# Patient Record
Sex: MALE | Race: WHITE
[De-identification: names, ages, dates, MRNs, and addresses within clinical notes are randomized per-mention and may not be internally consistent; named-entity substitution may affect disease eponyms.]

---

## 2017-01-27 ENCOUNTER — HOSPITAL ENCOUNTER (INPATIENT)
Dept: HOSPITAL 84 - D.M2 | Age: 72
LOS: 1 days | Discharge: HOME | DRG: 312 | End: 2017-01-28
Attending: FAMILY MEDICINE | Admitting: FAMILY MEDICINE
Payer: MEDICARE

## 2017-01-27 VITALS — DIASTOLIC BLOOD PRESSURE: 61 MMHG | SYSTOLIC BLOOD PRESSURE: 100 MMHG

## 2017-01-27 VITALS — SYSTOLIC BLOOD PRESSURE: 111 MMHG | DIASTOLIC BLOOD PRESSURE: 72 MMHG

## 2017-01-27 VITALS — DIASTOLIC BLOOD PRESSURE: 51 MMHG | SYSTOLIC BLOOD PRESSURE: 100 MMHG

## 2017-01-27 VITALS — BODY MASS INDEX: 24.64 KG/M2 | HEIGHT: 66 IN | WEIGHT: 153.32 LBS

## 2017-01-27 VITALS — SYSTOLIC BLOOD PRESSURE: 105 MMHG | DIASTOLIC BLOOD PRESSURE: 68 MMHG

## 2017-01-27 DIAGNOSIS — I42.9: ICD-10-CM

## 2017-01-27 DIAGNOSIS — N18.3: ICD-10-CM

## 2017-01-27 DIAGNOSIS — R07.9: ICD-10-CM

## 2017-01-27 DIAGNOSIS — I49.3: ICD-10-CM

## 2017-01-27 DIAGNOSIS — Z72.0: ICD-10-CM

## 2017-01-27 DIAGNOSIS — R94.31: ICD-10-CM

## 2017-01-27 DIAGNOSIS — R55: Primary | ICD-10-CM

## 2017-01-27 LAB
ALBUMIN SERPL-MCNC: 3.4 G/DL (ref 3.4–5)
ALP SERPL-CCNC: 80 U/L (ref 46–116)
ALT SERPL-CCNC: 14 U/L (ref 10–68)
ANION GAP SERPL CALC-SCNC: 9.6 MMOL/L (ref 8–16)
BASOPHILS NFR BLD AUTO: 0.2 % (ref 0–2)
BILIRUB SERPL-MCNC: 0.52 MG/DL (ref 0.2–1.3)
BUN SERPL-MCNC: 33 MG/DL (ref 7–18)
CALCIUM SERPL-MCNC: 9.6 MG/DL (ref 8.5–10.1)
CHLORIDE SERPL-SCNC: 103 MMOL/L (ref 98–107)
CK MB SERPL-MCNC: 0.5 U/L (ref 0–3.6)
CK SERPL-CCNC: 40 UL (ref 21–232)
CO2 SERPL-SCNC: 27.3 MMOL/L (ref 21–32)
CREAT SERPL-MCNC: 1.4 MG/DL (ref 0.6–1.3)
EOSINOPHIL NFR BLD: 0.7 % (ref 0–7)
ERYTHROCYTE [DISTWIDTH] IN BLOOD BY AUTOMATED COUNT: 16.2 % (ref 11.5–14.5)
GLOBULIN SER-MCNC: 3.9 G/L
GLUCOSE SERPL-MCNC: 108 MG/DL (ref 74–106)
HCT VFR BLD CALC: 39.1 % (ref 42–54)
HGB BLD-MCNC: 12.7 G/DL (ref 13.5–17.5)
IMM GRANULOCYTES NFR BLD: 0.2 % (ref 0–5)
LYMPHOCYTES NFR BLD AUTO: 19.1 % (ref 15–50)
MCH RBC QN AUTO: 28.2 PG (ref 26–34)
MCHC RBC AUTO-ENTMCNC: 32.5 G/DL (ref 31–37)
MCV RBC: 86.9 FL (ref 80–100)
MONOCYTES NFR BLD: 7.3 % (ref 2–11)
NEUTROPHILS NFR BLD AUTO: 72.5 % (ref 40–80)
OSMOLALITY SERPL CALC.SUM OF ELEC: 279 MOSM/KG (ref 275–300)
PLATELET # BLD: 199 10X3/UL (ref 130–400)
PMV BLD AUTO: 10.6 FL (ref 7.4–10.4)
POTASSIUM SERPL-SCNC: 3.9 MMOL/L (ref 3.5–5.1)
PROT SERPL-MCNC: 7.3 G/DL (ref 6.4–8.2)
RBC # BLD AUTO: 4.5 10X6/UL (ref 4.2–6.1)
SODIUM SERPL-SCNC: 136 MMOL/L (ref 136–145)
TROPONIN I SERPL-MCNC: 0.04 NG/ML (ref 0–0.06)
WBC # BLD AUTO: 10.4 10X3/UL (ref 4.8–10.8)

## 2017-01-27 NOTE — NUR
TRANSFERED FROM ADMISSIONS BY W/C.  OREINTED TO ROOM.  CALL LIGHT IN REACH.
WILL CONT. PLAN OF CARE.

## 2017-01-27 NOTE — NUR
PT RESTING SOUNDLY IN BED WITHOUT C/O OR DISTRESS NOTED. NSR ON MONITOR, HR
78. DENIES ANY C/O PAIN. LEFT FOREARM WITH NS @ 50 CC/HR, INFUSING WITHOUT
COMPLICATIONS. WILL CONT TO MONITOR.

## 2017-01-28 VITALS — SYSTOLIC BLOOD PRESSURE: 111 MMHG | DIASTOLIC BLOOD PRESSURE: 60 MMHG

## 2017-01-28 VITALS — DIASTOLIC BLOOD PRESSURE: 69 MMHG | SYSTOLIC BLOOD PRESSURE: 120 MMHG

## 2017-01-28 VITALS — DIASTOLIC BLOOD PRESSURE: 79 MMHG | SYSTOLIC BLOOD PRESSURE: 137 MMHG

## 2017-01-28 VITALS — SYSTOLIC BLOOD PRESSURE: 126 MMHG | DIASTOLIC BLOOD PRESSURE: 70 MMHG

## 2017-01-28 VITALS — SYSTOLIC BLOOD PRESSURE: 116 MMHG | DIASTOLIC BLOOD PRESSURE: 68 MMHG

## 2017-01-28 VITALS — DIASTOLIC BLOOD PRESSURE: 54 MMHG | SYSTOLIC BLOOD PRESSURE: 88 MMHG

## 2017-01-28 VITALS — SYSTOLIC BLOOD PRESSURE: 85 MMHG | DIASTOLIC BLOOD PRESSURE: 50 MMHG

## 2017-01-28 LAB
ALBUMIN SERPL-MCNC: 3.5 G/DL (ref 3.4–5)
ALP SERPL-CCNC: 79 U/L (ref 46–116)
ALT SERPL-CCNC: 19 U/L (ref 10–68)
ANION GAP SERPL CALC-SCNC: 10.2 MMOL/L (ref 8–16)
BASOPHILS NFR BLD AUTO: 0.2 % (ref 0–2)
BILIRUB SERPL-MCNC: 0.4 MG/DL (ref 0.2–1.3)
BUN SERPL-MCNC: 22 MG/DL (ref 7–18)
CALCIUM SERPL-MCNC: 9 MG/DL (ref 8.5–10.1)
CHLORIDE SERPL-SCNC: 104 MMOL/L (ref 98–107)
CK MB SERPL-MCNC: 0.6 U/L (ref 0–3.6)
CK SERPL-CCNC: 44 UL (ref 21–232)
CO2 SERPL-SCNC: 27 MMOL/L (ref 21–32)
CREAT SERPL-MCNC: 1.2 MG/DL (ref 0.6–1.3)
EOSINOPHIL NFR BLD: 2 % (ref 0–7)
ERYTHROCYTE [DISTWIDTH] IN BLOOD BY AUTOMATED COUNT: 16.3 % (ref 11.5–14.5)
GLOBULIN SER-MCNC: 3.5 G/L
GLUCOSE SERPL-MCNC: 109 MG/DL (ref 74–106)
HCT VFR BLD CALC: 42.1 % (ref 42–54)
HGB BLD-MCNC: 13.4 G/DL (ref 13.5–17.5)
IMM GRANULOCYTES NFR BLD: 0 % (ref 0–5)
LYMPHOCYTES NFR BLD AUTO: 22.4 % (ref 15–50)
MCH RBC QN AUTO: 28 PG (ref 26–34)
MCHC RBC AUTO-ENTMCNC: 31.8 G/DL (ref 31–37)
MCV RBC: 88.1 FL (ref 80–100)
MONOCYTES NFR BLD: 7.8 % (ref 2–11)
NEUTROPHILS NFR BLD AUTO: 67.6 % (ref 40–80)
OSMOLALITY SERPL CALC.SUM OF ELEC: 277 MOSM/KG (ref 275–300)
PLATELET # BLD: 203 10X3/UL (ref 130–400)
PMV BLD AUTO: 10.8 FL (ref 7.4–10.4)
POTASSIUM SERPL-SCNC: 4.2 MMOL/L (ref 3.5–5.1)
PROT SERPL-MCNC: 7 G/DL (ref 6.4–8.2)
RBC # BLD AUTO: 4.78 10X6/UL (ref 4.2–6.1)
SODIUM SERPL-SCNC: 137 MMOL/L (ref 136–145)
TROPONIN I SERPL-MCNC: 0.04 NG/ML (ref 0–0.06)
WBC # BLD AUTO: 8.1 10X3/UL (ref 4.8–10.8)

## 2017-01-28 NOTE — NUR
Is the patient Alert and Oriented? Yes  0
* How many steps to enter\exit or inside your home? Three  0
* PCP DR Huertas  0
* Pharmacy Mail order delivery- [facility]
Short term medications- [facility]  0
* Preadmission Environment Home Alone  0
* ADLs Independent  0
* Equipment Cane  0
* Other Equipment N/A  0
* List name and contact numbers for known caregivers / representatives who
currently or will assist patient after discharge: Luna Carr - [facility]-
[phone]
Luna- [facility]- [phone]  0
* Community resources currently utilized None  0
* Additional services required to return to the preadmission environment? Yes
0
* Can the patient safely return to the preadmission environment? Yes  0
* Has this patient been hospitalized within the prior 30 days at any hospital?
No
CM met w/ patient and his 2 daughters at the bedside. They requested home
health. Patient lives alone. Had syncopal episodes. He has 3 steps to enter
his home. One daughter lines in [facility]. His daughter, Luna,
lives in [place].
He had home health 2015 and could not recall the agency name. CM called health
information & it was [facility] which is now [facility].
PCP in DR Huertas
Cardiologist is DR Heredia
Pharmacy- [facility]
Patient has transportation.
MD order and Patient choice form obtained.
TC to [facility]. CM spoke w/ Amy.
Faxed referral. Patient will be seen Sunday or Monday. They will notify the
patient of visit day.

## 2017-01-30 NOTE — EC
PATIENT:ABRRY TRUONG           DATE OF SERVICE: 01/27/17
SEX: M                                  MEDICAL RECORD: [id number]
DATE OF BIRTH: [birth year]                        LOCATION:D.      D.211
AGE OF PATIENT: 71                             ADMISSION DATE: 01/27/17
 
REFERRING PHYSICIAN:                               
 
INTERPRETING PHYSICIAN: RE HEREDIA MD             
 
 
 
                             ECHOCARDIOGRAM REPORT
  ECHO CHARGES 4               ECHO COMPLETE            
 
 
 
CLINICAL DIAGNOSIS: CP/SYCOPE/SOB                 
 
                         ECHOCARDIOGRAPHIC MEASUREMENTS
      (adult normal given)
        AC root (d.<3.7cm) 3.5    LV Septum d (<1.2 cm> 1.4 
           Valve Excursion 1.4      LV Septum (systole) 1.9 
     Left Atria (s.<4.0cm> 3.7           LVPW d(<1.2cm) 1.5 
             RV (d.<2.3cm) 2.6            LVPW (sytole) 1.6 
       LV diastole(<5.6CM) 6.4        MV E-F(>70mm/sec)     
                LV systole 4.7            LVOT Diameter 2.2 
            MV exc.(>10mm)     
Est.ejection fraction (50-75%)     Pericardial Effusion N
 
   DOPPLER:
     LVIT       A 96.0 E 52.0
       LA      RVSP 57.0
     LVOT 68.0 AOP1/2T 401.0
  Asc. Ao 214 
     RVOT 88.0
       RA     
        
 AV Gradient Peak 18.3  AV Mean 8.2   AV Area 1.4 
 MV Gradient Peak 4.1   MV Mean 1.1   MV Area     
   COMMENTS:                                              
 
 
 Cardiac Sonographer: Russ ORTIZ            
      Cardiologist:1          Dr. Heredia                
             TAPE# PACS           
                                                                                     
 
 
DATE OF SERVICE:  01/27/2017
 
Echocardiogram
 
FINDINGS:
1.  Left ventricular chamber size is mildly dilated.  Left ventricular systolic
function is markedly reduced, overall ejection fraction 25% to 30%.  This
however, is an improvement on his previous echocardiogram done last year showing
ejection fraction 20%.
2.  Left atrium is within normal limits at 3.7 cm.  Right atrium and right
 
 
 
ECHOCARDIOGRAM REPORT                          [id number]    BARRY TRUONG   
 
 
ventricular chamber sizes are upper limits of normal.
3.  Valvular structures have normal structure and motion.
4.  Doppler interrogation reveals mild aortic insufficiency, moderate mitral
regurgitation, and mild tricuspid regurgitation.  No other valvular
insufficiency or stenosis; however, pulmonary systolic pressure is mildly
elevated estimated at 57 mmHg.
5.  No evidence of pericardial effusion or left ventricular thrombus.
 
TRANSINT:[id number] Voice Confirmation ID: [id number] DOCUMENT ID: [id number]
                                           
                                           RE HEREDIA MD             
 
 
 
Electronically Signed by RE HEREDIA on 01/30/17 at 1416
 
 
 
 
 
 
 
 
 
 
 
 
 
 
 
 
 
 
 
 
 
 
 
 
 
 
 
 
 
 
 
CC: BRANDON NEGRETE DO                                         [id number]
DICTATION DATE: 01/27/17 1844     :     01/28/17 0821      DIS IN  
                                                                      01/28/17
[facility]                                          
[address] [place] [address]

## 2017-01-30 NOTE — CN
PATIENT NAME:BARRY CARR                         MEDICAL RECORD: [id number]
: 41                                              LOCATION:D. D.2116
ADMIT DATE: 17                                       ACCOUNT: [id number]
CONSULTING PHYSICIAN:    RE CONTRERAS MD             
                                               
REFERRING PHYSICIAN:     BRANDON NEGRETE DO           
 
 
DATE OF CONSULTATION:  2017
 
Cardiology Consultation
 
DIAGNOSES:
1.  Syncope.
2.  Cardiomyopathy, nonischemic.
3.  Abnormal ECG.
 
HISTORY OF PRESENT ILLNESS:  Mr. Carr got out of bed at night to go the
bathroom and had an episode of syncope.  He did not have any chest pain or chest
discomfort.  He has a history of a nonischemic cardiomyopathy, cardiac
catheterization last year revealed ejection fraction in the 25% to 30% range. 
This has continued on an echocardiogram we have from ____.  He is overall
asymptomatic from a heart failure standpoint.  He has not had any significant
arrhythmias on telemetry, only occasional PVCs.
 
PHYSICAL EXAMINATION:
GENERAL APPEARANCE:  Well-nourished, well-developed, appears stated age.  Level
of distress, comfortable. 
PSYCHIATRIC:  Mental status, alert, normal affect.  Orientation, oriented to
time, place and person.  
EYES:  Lids and conjunctiva, noninjected.  No discharge, no pallor. 
ENT:  Lips, teeth, gums, normal dentition.  Oropharynx, no cyanosis, no pallor. 
NECK:  Carotid arteries, bilateral normal upstroke, no bruits, no thrills. 
JUGULAR VEINS:  No jugular venous pressure or distention. 
CERVICAL LYMPH NODES:  Nontender, nonenlarged.  
THYROID:  Not enlarged.  Nontender.  No nodules. 
LUNGS:  Respiratory effort, unlabored. 
CHEST:  Normal curvature.  No thoracic deformity.  No chest wall tenderness. 
Percussion, resonant.  Auscultation, clear.  No wheezes, no rales, no rhonchi. 
CARDIOVASCULAR:  Precordial exam, nondisplaced.  No heaves or pericardial
thrills.  Rate and rhythm, regular.  Heart sounds, normal S1, normal S2.  No S3,
no gallop, no rub.  Systolic murmur, not heard.  Diastolic murmur, not heard. 
EXTREMITIES:  No cyanosis, no edema.  Peripheral pulses, full and equal in all
extremities, except as noted.  No bruits appreciated. 
ABDOMEN:  Soft, nondistended.  Normal aorta.  No bruit.  Nontender.  No masses. 
Liver, nontender, no hepatomegaly.  Spleen, nontender, no splenomegaly.  
MUSCULOSKELETAL:  No joint tenderness.  No joint swelling.  No erythema.  
NEUROLOGICAL:  Normal gait, normal strength, normal tone.
SKIN:  Warm and dry.
 
REVIEW OF SYSTEMS:  The patient reports easy bruising but reports no swollen
glands. The patient reports no fever, no night sweats, no significant weight
gain, no significant weight loss.  No significant exercise tolerance.  The
patient reports no dry eyes, no irritation, no vision change.  Patient reports
no difficulty hearing and no ear pain.  Patient reports no frequent nose bleeds
or nose and sinus problems.  Patient reports on arm pain on exertion.   No
shortness of breath while lying down.  No history of heart murmur.  Patient
reports no cough, no wheezing or coughing up blood.  Patient reports no
 
 
 
CONSULT REPORT                                 [id number]    BARRY CARR       
 
 
abdominal pain, no vomiting.  Normal appetite.  No diarrhea and not vomiting
blood.  No nausea and no constipation.  Patient reports no incontinence.  No
difficulty urinating.  No hematuria.  No increased frequency.  Patient reports
no muscle aches.  No weakness, no arthralgias, no back pain.  No swelling of the
extremities.  Patient reports no abnormal mole, no jaundice, no rashes.  Reports
no loss of consciousness.  No weakness and no numbness.  No seizures, dizziness,
or headaches.  The patient reports no depression, no sleep disturbance, feeling
safe in a relationship and no alcohol abuse.  Patient reports on fatigue. 
Reports no runny nose or sinus pressure.  No itching, no hives, and no frequent
sneezing.  
 
OVERALL IMPRESSION:  Nonischemic cardiomyopathy.  His heart rate is 90, however,
his systolic blood pressure is 105, it would be hard to increase his carvedilol
to get better heart rate control with this.  We have no reason to think that
this was an arrhythmia.  It barely maybe an orthostatic event.  He has not been
orthostatic since he has been here on blood pressure.  At this time, no other
cardiac workup treatment is necessary.
 
TRANSINT:[id number] Voice Confirmation ID: [id number] DOCUMENT ID: [id number]
                                           
                                           RE CONTRERAS MD             
 
 
 
Electronically Signed by RE CONTRERAS on 17 at 1416
 
 
 
 
 
 
 
 
 
 
 
 
 
 
 
 
 
 
 
 
 
 
CC:                                                             [id number]
DICTATION DATE: 17 448     :     17 2346      DIS IN  
                                                                      17
[facility]                                          
 [place] [address]

## 2017-09-21 ENCOUNTER — HOSPITAL ENCOUNTER (INPATIENT)
Dept: HOSPITAL 84 - OBSVTIME | Age: 72
LOS: 3 days | Discharge: HOME | DRG: 352 | End: 2017-09-24
Attending: SURGERY | Admitting: SURGERY
Payer: MEDICARE

## 2017-09-21 VITALS
BODY MASS INDEX: 25.23 KG/M2 | BODY MASS INDEX: 25.23 KG/M2 | HEIGHT: 66 IN | WEIGHT: 157 LBS | BODY MASS INDEX: 25.23 KG/M2

## 2017-09-21 VITALS — SYSTOLIC BLOOD PRESSURE: 121 MMHG | DIASTOLIC BLOOD PRESSURE: 66 MMHG

## 2017-09-21 VITALS — DIASTOLIC BLOOD PRESSURE: 58 MMHG | SYSTOLIC BLOOD PRESSURE: 143 MMHG

## 2017-09-21 VITALS — DIASTOLIC BLOOD PRESSURE: 68 MMHG | SYSTOLIC BLOOD PRESSURE: 152 MMHG

## 2017-09-21 VITALS — DIASTOLIC BLOOD PRESSURE: 63 MMHG | SYSTOLIC BLOOD PRESSURE: 137 MMHG

## 2017-09-21 VITALS — DIASTOLIC BLOOD PRESSURE: 92 MMHG | SYSTOLIC BLOOD PRESSURE: 146 MMHG

## 2017-09-21 VITALS — SYSTOLIC BLOOD PRESSURE: 110 MMHG | DIASTOLIC BLOOD PRESSURE: 62 MMHG

## 2017-09-21 VITALS — DIASTOLIC BLOOD PRESSURE: 60 MMHG | SYSTOLIC BLOOD PRESSURE: 140 MMHG

## 2017-09-21 VITALS — DIASTOLIC BLOOD PRESSURE: 64 MMHG | SYSTOLIC BLOOD PRESSURE: 139 MMHG

## 2017-09-21 VITALS — SYSTOLIC BLOOD PRESSURE: 150 MMHG | DIASTOLIC BLOOD PRESSURE: 76 MMHG

## 2017-09-21 VITALS — DIASTOLIC BLOOD PRESSURE: 76 MMHG | SYSTOLIC BLOOD PRESSURE: 150 MMHG

## 2017-09-21 VITALS — SYSTOLIC BLOOD PRESSURE: 135 MMHG | DIASTOLIC BLOOD PRESSURE: 68 MMHG

## 2017-09-21 DIAGNOSIS — K40.91: Primary | ICD-10-CM

## 2017-09-21 DIAGNOSIS — R22.2: ICD-10-CM

## 2017-09-21 LAB
ALBUMIN SERPL-MCNC: 3.2 G/DL (ref 3.4–5)
ALP SERPL-CCNC: 131 U/L (ref 46–116)
ALT SERPL-CCNC: 72 U/L (ref 10–68)
ANION GAP SERPL CALC-SCNC: 10.5 MMOL/L (ref 8–16)
APTT BLD: 32 SECONDS (ref 22.8–39.4)
BASOPHILS NFR BLD AUTO: 0.3 % (ref 0–2)
BILIRUB SERPL-MCNC: 0.71 MG/DL (ref 0.2–1.3)
BUN SERPL-MCNC: 14 MG/DL (ref 7–18)
CALCIUM SERPL-MCNC: 8.9 MG/DL (ref 8.5–10.1)
CHLORIDE SERPL-SCNC: 103 MMOL/L (ref 98–107)
CO2 SERPL-SCNC: 27.8 MMOL/L (ref 21–32)
CREAT SERPL-MCNC: 1.8 MG/DL (ref 0.6–1.3)
EOSINOPHIL NFR BLD: 3 % (ref 0–7)
ERYTHROCYTE [DISTWIDTH] IN BLOOD BY AUTOMATED COUNT: 15.3 % (ref 11.5–14.5)
GLOBULIN SER-MCNC: 4.4 G/L
GLUCOSE SERPL-MCNC: 88 MG/DL (ref 74–106)
HCT VFR BLD CALC: 39.5 % (ref 42–54)
HGB BLD-MCNC: 13.2 G/DL (ref 13.5–17.5)
IMM GRANULOCYTES NFR BLD: 0.2 % (ref 0–5)
INR PPP: 1.09 (ref 0.85–1.17)
LYMPHOCYTES NFR BLD AUTO: 18.5 % (ref 15–50)
MCH RBC QN AUTO: 29.7 PG (ref 26–34)
MCHC RBC AUTO-ENTMCNC: 33.4 G/DL (ref 31–37)
MCV RBC: 89 FL (ref 80–100)
MONOCYTES NFR BLD: 11.1 % (ref 2–11)
NEUTROPHILS NFR BLD AUTO: 66.9 % (ref 40–80)
OSMOLALITY SERPL CALC.SUM OF ELEC: 275 MOSM/KG (ref 275–300)
PLATELET # BLD: 246 10X3/UL (ref 130–400)
PMV BLD AUTO: 9.6 FL (ref 7.4–10.4)
POTASSIUM SERPL-SCNC: 3.3 MMOL/L (ref 3.5–5.1)
PROT SERPL-MCNC: 7.6 G/DL (ref 6.4–8.2)
PROTHROMBIN TIME: 14 SECONDS (ref 11.6–15)
RBC # BLD AUTO: 4.44 10X6/UL (ref 4.2–6.1)
SODIUM SERPL-SCNC: 138 MMOL/L (ref 136–145)
WBC # BLD AUTO: 6.4 10X3/UL (ref 4.8–10.8)

## 2017-09-21 PROCEDURE — 0JBB0ZZ EXCISION OF PERINEUM SUBCUTANEOUS TISSUE AND FASCIA, OPEN APPROACH: ICD-10-PCS | Performed by: SURGERY

## 2017-09-21 PROCEDURE — 0YU60JZ SUPPLEMENT LEFT INGUINAL REGION WITH SYNTHETIC SUBSTITUTE, OPEN APPROACH: ICD-10-PCS | Performed by: SURGERY

## 2017-09-21 NOTE — NUR
GOT PT OVER TO ROOM ON MEDSURG AND PT VOICED"IM HURTING NOW, I CANT
KEEP COUGHING". FLOOR NURSE IS GIVING REPORT SO I VOICED I WOULD
BRING THE PT SOME PAIN MEDICINE AS ORDERED AND STAY WITH THE PT FOR
10 MINUTES TO MONITOR PAIN MEDICATIONS EFFECTS. PT FAMILY IS IN THE
ROOM. BED IS LOW,SIDE RAILSX2,CALL LIGHT WITHIN REACH.[facility]

## 2017-09-21 NOTE — NUR
RESTING QUIETLY RESPIRATIONS WITH EASE AND UNLABORED. EMPTIED 90CC'S BLOODY
DRAINAGE FROM BRIE DRAIN

## 2017-09-21 NOTE — OP
PATIENT NAME:  BARRY TRUONG                     MEDICAL RECORD: [id number]
:41                                             LOCATION:D.MS [id number]
                                                         ADMISSION DATE:17
SURGEON:  LENIN ROTHMAN MD            
 
 
DATE OF OPERATION:  2017
 
PREOPERATIVE DIAGNOSIS:  Incarcerated left inguinal hernia.
 
POSTOPERATIVE DIAGNOSES:
1.  Recurrent left indirect inguinal hernia.
2.  Left groin mass, infected, of uncertain etiology.
 
PROCEDURES:
1.  Open recurrent left inguinal hernia repair with Vicryl mesh.
2.  Excision of infected subcutaneous mass in the left groin with placement of a
drain.
 
SURGEON:  Lenin Rothman MD
 
ASSISTANT:  None.
 
BLOOD LOSS:  100 cc.
 
ANESTHESIA:  General.
 
DRAINS:  Times 1 (19-Niuean round Mitchell drain).
 
The risks, possible complications, and alternatives to the procedure were
explained to the patient.  He elected to proceed.
 
OPERATIVE COURSE:  The patient was conveyed to the operating room electively on
2017.  General anesthesia was induced by the anesthesia staff.  The
abdomen and genitals were sterilely prepped and draped.  A transverse incision
was accomplished in the left lower quadrant.  Sharp dissection was carried down
through skin and subcutaneous tissue as well as Shara fascia.  The external
oblique aponeurosis was cleaned of overlying connective tissue.  I incised the
external oblique aponeurosis along the direction of its fibers.  I bluntly
dissected down through the internal oblique and the transversus abdominis muscle
layers.  A preperitoneal pocket was fashioned bluntly.  There was no direct
component and no femoral component.  There was a recurrent indirect inguinal
hernia.  The patient interestingly, preoperatively, told me that he had had a
hernia repair at the right groin; however, because of pubic hair, I could not
see that he actually had a left groin scar.  There was scarring in the left
lower quadrant, particularly superficial to the external oblique aponeurosis
from his prior repair.
 
His prior repair appeared to be either a suture repair or an onlay repair. 
There was a recurrent indirect inguinal hernia and it was reduced in its
entirety.
 
A subcutaneous tract was created down to this extraperitoneal mass which was
actually in the left groin and adherent to the left cord structures.  I was able
to free up most of this through blunt dissection.  This may represent some type
of neoplasm.  This may represent some type of lymphatic abnormality.  I am
really not sure what it represented; however, the patient's history that it has
only been present for few days is not consistent with what I saw.  Purulence was
 
 
 
OPERATIVE REPORT                               [id number]    BARRY TRUONG   
 
 
identified.  Cultures were obtained.  Meticulous hemostasis was achieved with
the electrocautery.
 
As purulence had identified, synthetic mesh was going to be out of the question.
 I elected to perform the hernia repair utilizing Vicryl mesh.
 
Two layers of Vicryl mesh were cut in ovals .  One was placed on top of the
other and they were sutured together with running #1 Surgidac.  The mesh was
placed in the preperitoneal space.  Once I was satisfied with placement, I
allowed the internal oblique and transversus abdominis muscles to come together
and I sutured them together with multiple interrupted horizontal mattress #0
Surgidacs, incorporating a portion of the underlying mesh.
 
The external oblique aponeurosis was closed with a running #1 Vicryl.  A
19-Niuean round Mitchell drain was brought out through a stab incision laterally. 
The drain was sutured to the skin with a 4-0 nylon.  The tip of the drain was
placed down into the left hemiscrotum.
 
Shara fascia was approximated with interrupted 3-0 Vicryls.  The skin was
approximated with metallic clips.  A sterile dressing was applied.
 
The patient was then extubated and conveyed to the postanesthesia care unit,
where he was in stable condition.
 
TRANSINT:[id number] Voice Confirmation ID: [id number] DOCUMENT ID: [id number]
                                           
                                           LENIN ROTHMAN MD            
 
 
 
 
 
 
 
 
 
 
 
 
 
 
 
 
 
 
 
 
CC: BRANDON NEGRETE DO                                         [id number]
DICTATION DATE: 17 164     :     17 1834      ADM IN  
                                                                              
[facility]                                          
[address] [id number]

## 2017-09-21 NOTE — NUR
PT VOICES"THE PAIN MEDICINE IS HELPING". BP /64
PULSE 58, RR 18, OXYGEN SAT 99%. HANDING OFF PT TO NURSE SALVADOR ROBERST.

## 2017-09-21 NOTE — HP
PATIENT: BARRY TRUONG                           MEDICAL RECORD: [id number]
ACCOUNT: [id number]                                    LOCATION:D.MS BROWN9
: 41                                            ADMISSION DATE: 17
                                                         
 
                             HISTORY AND PHYSICAL EXAMINATION
 
 
ADDENDUM
 
CHIEF COMPLAINT:  Pain.
 
HISTORY OF PRESENT ILLNESS:  The patient has noticed a left inguinal bulge for
about a day or two.  It is irreducible.  I was contacted by Dr. Aman Huertas about
this mass.  I rather asked for the patient to come to the office.  I was unable
to reduce this mass in the office.  I was able to partially reduce it.  This
appears to be an incarcerated left inguinal hernia.  I have explained the
pathophysiology of hernias to the patient including how they are repaired.  We
discussed the risks, possible complications, and alternatives of the procedure. 
I specifically discussed with him that we would be using mesh.  We discussed the
possibility of a pseudo sac, hematoma, or seroma.  Palpation aggravates. 
Nothing alleviates.  The pain is severe.  He has had no nausea, no vomiting, no
abdominal distention, no obstructive symptoms.  He has had a prior right
inguinal hernia, although I am unable to detect the scar on the right.
 
This is a history and physical addendum.  For the typed portion of the history
and physical, please see the chart.  This would include the past medical
history, surgical history, allergies, social history, family history, and
current medications.
 
REVIEW OF SYSTEMS:  No nausea, no vomiting, no fever, no chills.  Positive for
left inguinal pain.
 
PHYSICAL EXAMINATION:
GENERAL:  The patient does not appear acutely ill.  He does appear chronically
ill.
VITAL SIGNS:  Reviewed.
EARS:  External ears appear normal.
EYES:  Extraocular movements are intact.
NECK:  Trachea is midline.
CHEST:  No intercostal retractions.
PULMONARY:  Nonlabored.
ABDOMEN:  Nontender.
GENITOURINARY:  Distended testicles.  Left inguinal bulge, which is irreducible.
EXTREMITIES:  No peripheral cyanosis.
INTEGUMENT:  No rash, no ulcerations.
PSYCHIATRIC:  Normal affect.
NEUROLOGIC:  Nonfocal, no lethargy.  The patient answers questions
appropriately, moves all extremities well.
BACK:  Thoracic kyphosis is present.
LYMPHATICS:  No lymphangitic streaking of the exposed extremities.
 
IMPRESSION:  Incarcerated left inguinal hernia.
 
PLAN:  Open left inguinal hernia repair with mesh.
 
TRANSINT:[id number] Voice Confirmation ID: [id number] DOCUMENT ID: [id number]
 
 
 
 
HISTORY AND PHYSICAL                           [id number]    BARRY TRUONG ROBERT MD            
 
 
 
 
 
 
 
 
 
 
 
 
 
 
 
 
 
 
 
 
 
 
 
 
 
 
 
 
 
 
 
 
 
 
 
 
 
 
 
 
 
 
 
 
 
CC:                                                             [id number]
DICTATION DATE: 17 1337     :     17 1359      ADM IN  
                                                                              
[place] [id number] [place] [address] [phone]

## 2017-09-21 NOTE — NUR
RETURNED TO ROOM POST OP LEFT HERNIA REPAIN. DRESSING TO LEFT LOWER ABDOMEN
WITH BRIE DRAIN IN PLACE AND COMPRESSED. IV PATENT LEFT FOREARM OF NS AT 100CC'S
PER HR. O2 ON 2L/M PER NC. HOB UP 30 DEGREES. SR UP X2 CALL LIGHT WITHIN REACH
FAMILY IN ROOM.

## 2017-09-21 NOTE — NUR
PCA OF DILAUDID SET UP FOR PAIN CONTROL INSTRUCTED PATIENT ON HOW TO OPERATE
PCA. SR UP X2 CALL LIGHT WITHIN REACH.

## 2017-09-21 NOTE — NUR
IV SITED TO THE LEFT FOREARM, 22G FLUSHED WITH NS AND SECURED WITH OP-SITE AND
TAPE. BED IN LOW POSITION AND CALL LIGHT WITHIN REACH. WILL CONTINUE TO
MONITOR.

## 2017-09-22 VITALS — SYSTOLIC BLOOD PRESSURE: 127 MMHG | DIASTOLIC BLOOD PRESSURE: 61 MMHG

## 2017-09-22 VITALS — SYSTOLIC BLOOD PRESSURE: 129 MMHG | DIASTOLIC BLOOD PRESSURE: 63 MMHG

## 2017-09-22 VITALS — SYSTOLIC BLOOD PRESSURE: 139 MMHG | DIASTOLIC BLOOD PRESSURE: 62 MMHG

## 2017-09-22 VITALS — SYSTOLIC BLOOD PRESSURE: 113 MMHG | DIASTOLIC BLOOD PRESSURE: 68 MMHG

## 2017-09-22 VITALS — SYSTOLIC BLOOD PRESSURE: 94 MMHG | DIASTOLIC BLOOD PRESSURE: 47 MMHG

## 2017-09-22 VITALS — DIASTOLIC BLOOD PRESSURE: 56 MMHG | SYSTOLIC BLOOD PRESSURE: 125 MMHG

## 2017-09-22 VITALS — DIASTOLIC BLOOD PRESSURE: 46 MMHG | SYSTOLIC BLOOD PRESSURE: 132 MMHG

## 2017-09-22 VITALS — SYSTOLIC BLOOD PRESSURE: 143 MMHG | DIASTOLIC BLOOD PRESSURE: 58 MMHG

## 2017-09-22 NOTE — NUR
AWAKE AND ALERT. ORIENTED X3. NO C/O AT THIS TIME. LUNGS ARE CLEAR
BILATERALLY, NO COUGH NOTED. STATED HE IS DOING HIS DEEP BREATHING EXERCISES.
SKIN IS INTACT WITHOUT REDNESS EXCEPT INCISION TO LEFT LOWER QUAD WHICH HAS A
DRESSING IN PLACE WITH BLOODY DRAINAGE. WILL MONITOR. IV TO LEFT FOREARM IS
PATNET WITHOUT REDNESS AT INSERTION SITE. DENIES NEEDS.

## 2017-09-23 VITALS — DIASTOLIC BLOOD PRESSURE: 68 MMHG | SYSTOLIC BLOOD PRESSURE: 110 MMHG

## 2017-09-23 VITALS — DIASTOLIC BLOOD PRESSURE: 63 MMHG | SYSTOLIC BLOOD PRESSURE: 128 MMHG

## 2017-09-23 VITALS — DIASTOLIC BLOOD PRESSURE: 48 MMHG | SYSTOLIC BLOOD PRESSURE: 106 MMHG

## 2017-09-23 VITALS — DIASTOLIC BLOOD PRESSURE: 69 MMHG | SYSTOLIC BLOOD PRESSURE: 125 MMHG

## 2017-09-23 VITALS — SYSTOLIC BLOOD PRESSURE: 111 MMHG | DIASTOLIC BLOOD PRESSURE: 58 MMHG

## 2017-09-23 VITALS — SYSTOLIC BLOOD PRESSURE: 105 MMHG | DIASTOLIC BLOOD PRESSURE: 66 MMHG

## 2017-09-23 NOTE — NUR
AWAKE AND ALERT. ORIENTED X3. NO C/O AT THIS TIME. LUNGS ARE CLEAR
BILATERALLY, NO COUGH NOTED. SKIN IS INTACT WITHOUT REDNESS EXCEPT INCISION TO
LEFT LOWER QUAD WHICH HAS A DRY INTACT DRESSING IN PLACE. IV TO LEFT FOREARM
IS PATENT WITHOUT REDNESS AT INSERTION SITE. DENIES NEEDS.

## 2017-09-23 NOTE — NUR
AMBULATED 60 FEET WITH STAFF. GIVEN BED BATH PER STAFF AND LINENS CHANGED.
REPOSITIONED IN BED FOR COMFORT.

## 2017-09-24 VITALS — SYSTOLIC BLOOD PRESSURE: 124 MMHG | DIASTOLIC BLOOD PRESSURE: 61 MMHG

## 2017-09-24 VITALS — DIASTOLIC BLOOD PRESSURE: 54 MMHG | SYSTOLIC BLOOD PRESSURE: 114 MMHG

## 2017-09-24 VITALS — SYSTOLIC BLOOD PRESSURE: 127 MMHG | DIASTOLIC BLOOD PRESSURE: 52 MMHG

## 2017-09-24 NOTE — NUR
DISCHARGED TO HOME AMBULATORY WITH FAMILY. DISCHARGE INSTRUCTIONS GIVEN BOTH
VERBALLY AND WRITTEN. ALL QUESTIONS ANSWERED. PATIENT AND FAMILY VERBALIZED
UNDERSTANDING OF SAME. NEEDED PRESCRIPTIONS GIVEN TO PATIENT. IV TO LEFT
FOREARM D/C WITH CATHETER INTACT.

## 2017-09-24 NOTE — NUR
BRIE TO LEFT LOWER QUAD DC'D WITH NO LOSS OF BLOOD NOTED.  CORREIA BULB DEFLATED
OF 8CC OF SALINE AND REMOVED WITH TIP IN TACT.  1500 OF URINE IN BAG.  25ML OF
RED FLUID REMOVED FROM BRIE BULB.

## 2017-09-24 NOTE — NUR
DRESSING CHANGE TO LEFT LOWER QUAD PREFORMED AS WELL.  EDGES OF WOUND WELL
APPOROXIMATED WITH NO REDNESS, SWELLING, OR PURULANT DISHCARGE.  STAPLES IN
TACT.

## 2017-09-24 NOTE — NUR
PATIENT RESTING IN BED WITH EYES CLOSED AND NO VISIBLE SIGNS OF DISTRESS. BED
IN LOWEST POSITION AND CALL LIGHT WITHIN REACH.

## 2017-09-24 NOTE — NUR
AWAKE AND ALERT. ORIENTED X3. LUNGS ARE CLEAR BILATERALLY, NO COUGH NOTED.
SKIN IS INTACT WITHOUT REDNESS EXCEPT INCISION TO LEFT GROIN WHICH HAS A DRY
INTACT DRESSING IN PLACE. SL TO LEFT FOREARM IS PATENT WITHOUT REDNESS AT
INSERTION SITE. DENIES NEEDS. VOIDED 200CC CLEAR YELLOW URINE IN URINAL PER
SELF.

## 2017-11-24 ENCOUNTER — HOSPITAL ENCOUNTER (INPATIENT)
Dept: HOSPITAL 84 - D.ER | Age: 72
LOS: 5 days | Discharge: HOME HEALTH | DRG: 862 | End: 2017-11-29
Attending: SURGERY | Admitting: SURGERY
Payer: MEDICARE

## 2017-11-24 VITALS
HEIGHT: 66 IN | HEIGHT: 66 IN | WEIGHT: 132.48 LBS | BODY MASS INDEX: 21.29 KG/M2 | BODY MASS INDEX: 21.29 KG/M2 | WEIGHT: 132.48 LBS

## 2017-11-24 DIAGNOSIS — A49.01: ICD-10-CM

## 2017-11-24 DIAGNOSIS — I42.9: ICD-10-CM

## 2017-11-24 DIAGNOSIS — L02.214: ICD-10-CM

## 2017-11-24 DIAGNOSIS — I50.43: ICD-10-CM

## 2017-11-24 DIAGNOSIS — T81.4XXA: Primary | ICD-10-CM

## 2017-11-24 DIAGNOSIS — N18.3: ICD-10-CM

## 2017-11-24 LAB
ALBUMIN SERPL-MCNC: 2.2 G/DL (ref 3.4–5)
ALP SERPL-CCNC: 121 U/L (ref 46–116)
ALT SERPL-CCNC: 184 U/L (ref 10–68)
ANION GAP SERPL CALC-SCNC: 8.2 MMOL/L (ref 8–16)
APPEARANCE UR: CLEAR
BASOPHILS NFR BLD AUTO: 0.1 % (ref 0–2)
BILIRUB SERPL-MCNC: 0.98 MG/DL (ref 0.2–1.3)
BILIRUB SERPL-MCNC: NEGATIVE MG/DL
BUN SERPL-MCNC: 32 MG/DL (ref 7–18)
CALCIUM SERPL-MCNC: 8.6 MG/DL (ref 8.5–10.1)
CHLORIDE SERPL-SCNC: 97 MMOL/L (ref 98–107)
CO2 SERPL-SCNC: 30.8 MMOL/L (ref 21–32)
COLOR UR: YELLOW
CREAT SERPL-MCNC: 2.2 MG/DL (ref 0.6–1.3)
EOSINOPHIL NFR BLD: 0.2 % (ref 0–7)
ERYTHROCYTE [DISTWIDTH] IN BLOOD BY AUTOMATED COUNT: 15.2 % (ref 11.5–14.5)
GLOBULIN SER-MCNC: 5.2 G/L
GLUCOSE SERPL-MCNC: 118 MG/DL (ref 74–106)
GLUCOSE SERPL-MCNC: NEGATIVE MG/DL
HCT VFR BLD CALC: 41.1 % (ref 42–54)
HGB BLD-MCNC: 13.5 G/DL (ref 13.5–17.5)
IMM GRANULOCYTES NFR BLD: 0.3 % (ref 0–5)
KETONES UR STRIP-MCNC: NEGATIVE MG/DL
LYMPHOCYTES NFR BLD AUTO: 9.5 % (ref 15–50)
MCH RBC QN AUTO: 28.4 PG (ref 26–34)
MCHC RBC AUTO-ENTMCNC: 32.8 G/DL (ref 31–37)
MCV RBC: 86.3 FL (ref 80–100)
MONOCYTES NFR BLD: 8.3 % (ref 2–11)
NEUTROPHILS NFR BLD AUTO: 81.6 % (ref 40–80)
NITRITE UR-MCNC: NEGATIVE MG/ML
OSMOLALITY SERPL CALC.SUM OF ELEC: 273 MOSM/KG (ref 275–300)
PH UR STRIP: 6 [PH] (ref 5–6)
PLATELET # BLD: 496 10X3/UL (ref 130–400)
PMV BLD AUTO: 9.3 FL (ref 7.4–10.4)
POTASSIUM SERPL-SCNC: 3 MMOL/L (ref 3.5–5.1)
PROT SERPL-MCNC: 7.4 G/DL (ref 6.4–8.2)
PROT UR-MCNC: NEGATIVE MG/DL
RBC # BLD AUTO: 4.76 10X6/UL (ref 4.2–6.1)
SODIUM SERPL-SCNC: 133 MMOL/L (ref 136–145)
SP GR UR STRIP: 1.01 (ref 1–1.02)
UROBILINOGEN UR-MCNC: NORMAL MG/DL
WBC # BLD AUTO: 15 10X3/UL (ref 4.8–10.8)

## 2017-11-25 VITALS — SYSTOLIC BLOOD PRESSURE: 107 MMHG | DIASTOLIC BLOOD PRESSURE: 54 MMHG

## 2017-11-25 VITALS — DIASTOLIC BLOOD PRESSURE: 56 MMHG | SYSTOLIC BLOOD PRESSURE: 107 MMHG

## 2017-11-25 VITALS — DIASTOLIC BLOOD PRESSURE: 72 MMHG | SYSTOLIC BLOOD PRESSURE: 114 MMHG

## 2017-11-25 VITALS — SYSTOLIC BLOOD PRESSURE: 93 MMHG | DIASTOLIC BLOOD PRESSURE: 40 MMHG

## 2017-11-25 VITALS — DIASTOLIC BLOOD PRESSURE: 63 MMHG | SYSTOLIC BLOOD PRESSURE: 105 MMHG

## 2017-11-25 VITALS — DIASTOLIC BLOOD PRESSURE: 51 MMHG | SYSTOLIC BLOOD PRESSURE: 103 MMHG

## 2017-11-25 NOTE — NUR
HAS BEEN WITHOUT DISTRESS TODAY. MINIMAL PINK TINTED DRAINAGE FROM LEFT GROIN.
SMALL OPEN AREA ABOUT 1/4 INCH LONG.

## 2017-11-26 VITALS — SYSTOLIC BLOOD PRESSURE: 110 MMHG | DIASTOLIC BLOOD PRESSURE: 55 MMHG

## 2017-11-26 VITALS — DIASTOLIC BLOOD PRESSURE: 60 MMHG | SYSTOLIC BLOOD PRESSURE: 112 MMHG

## 2017-11-26 VITALS — DIASTOLIC BLOOD PRESSURE: 69 MMHG | SYSTOLIC BLOOD PRESSURE: 90 MMHG

## 2017-11-26 VITALS — SYSTOLIC BLOOD PRESSURE: 95 MMHG | DIASTOLIC BLOOD PRESSURE: 45 MMHG

## 2017-11-26 LAB
ANION GAP SERPL CALC-SCNC: 10.1 MMOL/L (ref 8–16)
BASOPHILS NFR BLD AUTO: 0.1 % (ref 0–2)
BUN SERPL-MCNC: 38 MG/DL (ref 7–18)
CALCIUM SERPL-MCNC: 7.7 MG/DL (ref 8.5–10.1)
CHLORIDE SERPL-SCNC: 99 MMOL/L (ref 98–107)
CO2 SERPL-SCNC: 25.3 MMOL/L (ref 21–32)
CREAT SERPL-MCNC: 2.4 MG/DL (ref 0.6–1.3)
EOSINOPHIL NFR BLD: 0.2 % (ref 0–7)
ERYTHROCYTE [DISTWIDTH] IN BLOOD BY AUTOMATED COUNT: 15.7 % (ref 11.5–14.5)
GLUCOSE SERPL-MCNC: 136 MG/DL (ref 74–106)
HCT VFR BLD CALC: 35.4 % (ref 42–54)
HGB BLD-MCNC: 11.6 G/DL (ref 13.5–17.5)
IMM GRANULOCYTES NFR BLD: 0.4 % (ref 0–5)
LYMPHOCYTES NFR BLD AUTO: 4.3 % (ref 15–50)
MCH RBC QN AUTO: 28 PG (ref 26–34)
MCHC RBC AUTO-ENTMCNC: 32.8 G/DL (ref 31–37)
MCV RBC: 85.5 FL (ref 80–100)
MONOCYTES NFR BLD: 5.9 % (ref 2–11)
NEUTROPHILS NFR BLD AUTO: 89.1 % (ref 40–80)
OSMOLALITY SERPL CALC.SUM OF ELEC: 273 MOSM/KG (ref 275–300)
PLATELET # BLD: 486 10X3/UL (ref 130–400)
PMV BLD AUTO: 9.8 FL (ref 7.4–10.4)
POTASSIUM SERPL-SCNC: 3.4 MMOL/L (ref 3.5–5.1)
RBC # BLD AUTO: 4.14 10X6/UL (ref 4.2–6.1)
SODIUM SERPL-SCNC: 131 MMOL/L (ref 136–145)
WBC # BLD AUTO: 18 10X3/UL (ref 4.8–10.8)

## 2017-11-26 NOTE — NUR
ZOFRAN GIVEN FOR NAUSEA. PATIENT STATED HE WAS COUGHING THEN VOMITIED TWICE.
PARTIAL LINEN CHANGE AND BATH GIVEN. DRESSING TO LLQ REPLACED SINCE OLD
DRESSING WAS SATURATED.

## 2017-11-26 NOTE — NUR
WOUND CLEANED AND PACKED AS ORDERED. STERILE TECH USED. IV RESITED TO RIGHT
FOREARM PER PT REQUEST.22G X1 STICK USING ASEPTIC TECH.IV DCD FROM LEFT AC
WITH CATH TIP INTACT.

## 2017-11-26 NOTE — NUR
LEFT INGUINAL ABSCESS WITH SEROSANG DRAINAGE. LPN SHANTANU CLEANING AND CHANGING
DRESSING. NO OTHER NEEDS. CONTINUE LPN'S PLAN OF CARE.

## 2017-11-27 VITALS — SYSTOLIC BLOOD PRESSURE: 111 MMHG | DIASTOLIC BLOOD PRESSURE: 53 MMHG

## 2017-11-27 VITALS — SYSTOLIC BLOOD PRESSURE: 109 MMHG | DIASTOLIC BLOOD PRESSURE: 61 MMHG

## 2017-11-27 VITALS — SYSTOLIC BLOOD PRESSURE: 115 MMHG | DIASTOLIC BLOOD PRESSURE: 59 MMHG

## 2017-11-27 VITALS — SYSTOLIC BLOOD PRESSURE: 116 MMHG | DIASTOLIC BLOOD PRESSURE: 54 MMHG

## 2017-11-27 VITALS — SYSTOLIC BLOOD PRESSURE: 121 MMHG | DIASTOLIC BLOOD PRESSURE: 56 MMHG

## 2017-11-27 LAB
ANION GAP SERPL CALC-SCNC: 10.9 MMOL/L (ref 8–16)
BASOPHILS NFR BLD AUTO: 0.1 % (ref 0–2)
BUN SERPL-MCNC: 27 MG/DL (ref 7–18)
CALCIUM SERPL-MCNC: 7.7 MG/DL (ref 8.5–10.1)
CHLORIDE SERPL-SCNC: 107 MMOL/L (ref 98–107)
CO2 SERPL-SCNC: 24.6 MMOL/L (ref 21–32)
CREAT SERPL-MCNC: 1.6 MG/DL (ref 0.6–1.3)
EOSINOPHIL NFR BLD: 0.8 % (ref 0–7)
ERYTHROCYTE [DISTWIDTH] IN BLOOD BY AUTOMATED COUNT: 15.7 % (ref 11.5–14.5)
GLUCOSE SERPL-MCNC: 97 MG/DL (ref 74–106)
HCT VFR BLD CALC: 35.9 % (ref 42–54)
HGB BLD-MCNC: 11.5 G/DL (ref 13.5–17.5)
IMM GRANULOCYTES NFR BLD: 0.5 % (ref 0–5)
LYMPHOCYTES NFR BLD AUTO: 8.4 % (ref 15–50)
MCH RBC QN AUTO: 27.4 PG (ref 26–34)
MCHC RBC AUTO-ENTMCNC: 32 G/DL (ref 31–37)
MCV RBC: 85.5 FL (ref 80–100)
MONOCYTES NFR BLD: 5.8 % (ref 2–11)
NEUTROPHILS NFR BLD AUTO: 84.4 % (ref 40–80)
OSMOLALITY SERPL CALC.SUM OF ELEC: 282 MOSM/KG (ref 275–300)
PLATELET # BLD: 472 10X3/UL (ref 130–400)
PMV BLD AUTO: 9.5 FL (ref 7.4–10.4)
POTASSIUM SERPL-SCNC: 3.5 MMOL/L (ref 3.5–5.1)
RBC # BLD AUTO: 4.2 10X6/UL (ref 4.2–6.1)
SODIUM SERPL-SCNC: 139 MMOL/L (ref 136–145)
WBC # BLD AUTO: 13 10X3/UL (ref 4.8–10.8)

## 2017-11-27 NOTE — NUR
PT INCONTINENT OF SOFT STOOL. CLEANED PT AND CHANGED PADS. PLACED BEDSIDE
COMMODE AND INSTRUCTED PT TO CALL FOR ASSISTANCE. PT VERBALIZED
UNDERSTANDING. CONTINUE LPN'S PLAN OF CARE.

## 2017-11-27 NOTE — NUR
ASSESSMENT AS PER FLOWSHEET. DRESSING TO LEFT GROIN AREA C/D/I. VOIDS IN
URINAL. IV APTENT RT FOREARM OF NS AT 125CC'S/HR SITE CLEAR. PCA OF MORPHINE
IN USE WITH SETTINGS AT 1MG Q10MIN W/10MG Q4H L/O. PATIENT CONTINUES TO PRESS
PCS BUTTON UNTIL HE IS IN LOCKOUT. SCD'S ON SR UP X2 CALL LIGHT WITHIN REACH.

## 2017-11-27 NOTE — NUR
C/O PAIN TO ABDOMEN OF 8. MORPHINE 4 MG SIVP. ALSO INSTRUCTED PATIENT TO TURN
EVERY 2 HOURS TO PREVENT SKIN BREAKDOWN. TURNED TO HIS RIGHT SIDE. STATES HE
WILL LIE ON THAT SIDE FOR 2 HOURS BEFORE TURNING AGAIN. BED ALARM ON. CALL
LIGHT IN REACH.

## 2017-11-27 NOTE — NUR
ASSESSMENT COMPLETED. AM MEDS ADMINISTERED EXCEPT FOR COREG FOR DBP OF 59.
MORPHINE 4 MG SIVP PER C/O PAIN OF 8 TO ABDOMEN. CALL LIGHT IN REACH. WILL
CONTINUE WITH PLAN OF CARE.

## 2017-11-27 NOTE — NUR
Vancomycin trough is 11.3 today. Not going to increase dose because of renal
function.
Ordered trough for 11-29 at 0800.

## 2017-11-27 NOTE — NUR
RESTING IN BED. ASSISTED WITH FINDING URINAL AT THIS TIME. ALSO FILLED MENU
OUT FOR PATIENT. DENIES PAIN OR DISCOMFORT. NO NEEDS NOTED.

## 2017-11-27 NOTE — NUR
NO CHANGES IN INITIAL ASSESSMENT. CALL LIGHT IN REACH. SCDs TO BLE. BED ALARM
ON. WILL CONTINUE WITH PLAN OF CARE.

## 2017-11-27 NOTE — NUR
Patient Name: BARRY TRUONG
Admission Status: ER
Accout number: [id number]
Admission Date: 2017
: 1945
Admission Diagnosis:
Attending: WOODROW GILBERT
Current LOS: 3
 
Anticipated DC Date:
Planned Disposition: Home
Primary Insurance: [employer] CHOICE PPO MCR ADVANT
 
 
Discharge Planning Comments:
CM met with patient to assess discharge planning needs. Patient lives
independently at home alone and that is where he plans to discharge too.
Patient stated that his daughter Mary Ann will be the one to drive home.
Patient stated that he is not current with HH, but has had [facility] in the
past and if needed would like to use them again. Patient has a cane and a
walker at home. CM will continue to follow and assist with discharge planning
needs.
 
PCP: Aleksandar Rodriguez
Mary Ann (daughter)
 
 
 
 
 
: Shu Lacey
 
* Is the patient Alert and Oriented? Yes  0
* How many steps to enter\exit or inside your home? 3  0
* PCP Aleksandar  0
* Pharmacy Budget  0
* Preadmission Environment Home Alone  0
* ADLs Independent  0
* Equipment Rolling Walker  0
* List name and contact numbers for known caregivers / representatives who
currently or will assist patient after discharge: Mary Ann (daughter)  0
* Community resources currently utilized None  0
* Please name any agencies selected above. [facility]  0
* Additional services required to return to the preadmission environment? Yes
0
* Can the patient safely return to the preadmission environment? Yes  0
* Has this patient been hospitalized within the prior 30 days at any hospital?
No  0
    Grand Total:  0

## 2017-11-28 VITALS — DIASTOLIC BLOOD PRESSURE: 57 MMHG | SYSTOLIC BLOOD PRESSURE: 124 MMHG

## 2017-11-28 VITALS — SYSTOLIC BLOOD PRESSURE: 115 MMHG | DIASTOLIC BLOOD PRESSURE: 62 MMHG

## 2017-11-28 VITALS — SYSTOLIC BLOOD PRESSURE: 168 MMHG | DIASTOLIC BLOOD PRESSURE: 60 MMHG

## 2017-11-28 VITALS — SYSTOLIC BLOOD PRESSURE: 111 MMHG | DIASTOLIC BLOOD PRESSURE: 60 MMHG

## 2017-11-28 VITALS — SYSTOLIC BLOOD PRESSURE: 117 MMHG | DIASTOLIC BLOOD PRESSURE: 64 MMHG

## 2017-11-28 VITALS — DIASTOLIC BLOOD PRESSURE: 62 MMHG | SYSTOLIC BLOOD PRESSURE: 118 MMHG

## 2017-11-28 VITALS — SYSTOLIC BLOOD PRESSURE: 136 MMHG | DIASTOLIC BLOOD PRESSURE: 62 MMHG

## 2017-11-28 NOTE — NUR
MAXIMUM LIMIT REACHED ON PCA. EXPLAINED TO PATIENT AGAIN THAT HE CAN ONLY HAVE
A TOTAL OF 10 MG IN 4 HOURS THEN IT LOCKS OUT. VERBALIZED UNDERSTANDING.

## 2017-11-28 NOTE — NUR
ASSESSMENT COMPLETED. AM MEDS ADMINISTERED. SCDs TO BLE. BED ALARM ON. CALL
LIGHT IN REACH. WILL CONTINUE WITH PLAN OF CARE.

## 2017-11-28 NOTE — NUR
ASKED PATIENT IF HE WOULD LIKE TO GET UP AND WALK IN HALLWAYS RIGHT NOW BUT
HE STATED THAT HE GOT UP WITH PHYSICAL THERAPY EARLIER TODAY AND DID NOT WALK
THAT FAR. STATES HE IS TURNING IN BED EVERY 2 HOURS LIKE WE DISCUSSED
YESTERDAY. SCDs TO BLE. CALL LIGHT IN REACH.

## 2017-11-28 NOTE — NUR
PCA HAS BEEPED SEVERAL TIMES SAYING MAXIMUM LIMIT REACHED. PATIENT STATES THAT
HE IS PUSHING THE BUTTON OVER AND OVER. I EXPLAINED TO PATIENT AGAIN THAT HE
CAN ONLY HIT THE BUTTON ONCE WHEN THE LIGHT ON THE BUTTON IS GREEN AND HE
CANNOT HIT IT AGAIN UNTIL IT IS GREEN AGAIN. PATIENT STATES HE NOW
UNDERSTANDS. BED ALARM IS ON. CALL LIGHT IN REACH.

## 2017-11-28 NOTE — NUR
ASSESSMENT AS PER FLOWSHEET. DRSG TO LEFT GROIN AREA C/D/I. IV PATENT RT ARM
OF NS AT 125CC'S/HR PCA OF MORPHINE IN USE WITH SETTINGS AT 1MG Q10MIN W/10MG
Q4H L/O. SCD'S ON WHILE IN BED. BED ALARM ON. SR UP X2 CALL LIGHT WITHIN
REACH.

## 2017-11-29 VITALS — DIASTOLIC BLOOD PRESSURE: 57 MMHG | SYSTOLIC BLOOD PRESSURE: 107 MMHG

## 2017-11-29 VITALS — SYSTOLIC BLOOD PRESSURE: 113 MMHG | DIASTOLIC BLOOD PRESSURE: 64 MMHG

## 2017-11-29 VITALS — DIASTOLIC BLOOD PRESSURE: 61 MMHG | SYSTOLIC BLOOD PRESSURE: 116 MMHG

## 2017-11-29 NOTE — NUR
ASSESSMENT PER FLOW SHEET.PT WITHOUT DISTRESS.FALL PREVENTION IN PLACE WITH
BED ALARM,BAND AND SOCKS IN PLACE. PT REPORTS LOOSE STOOLS AND HAS
INCONTINENT EPPISODES.REDNESS NOTED TO INNER BUTTOCKS.CALL LIGHT IN REACH.SCDS
ON

## 2017-11-29 NOTE — NUR
INC LARGE SOFT STOOL IN BED. PT STATES HE THOUGHT IT WAS GAS. PT TAKES HIS
HANDS AND SMEARS STOOL ON HIS HANDS. COMPLETE BED BATH AND LINENS CHANGED PER
CNA. MIHAI

## 2017-12-09 ENCOUNTER — HOSPITAL ENCOUNTER (EMERGENCY)
Dept: HOSPITAL 84 - D.ER | Age: 72
Discharge: HOME | End: 2017-12-09
Payer: MEDICARE

## 2017-12-09 VITALS — BODY MASS INDEX: 21.3 KG/M2

## 2017-12-09 DIAGNOSIS — M79.604: Primary | ICD-10-CM

## 2018-07-27 ENCOUNTER — HOSPITAL ENCOUNTER (EMERGENCY)
Dept: HOSPITAL 84 - D.ER | Age: 73
Discharge: HOME | End: 2018-07-27
Payer: MEDICARE

## 2018-07-27 VITALS
WEIGHT: 145.3 LBS | HEIGHT: 66 IN | BODY MASS INDEX: 23.35 KG/M2 | BODY MASS INDEX: 23.35 KG/M2 | WEIGHT: 145.3 LBS | HEIGHT: 66 IN

## 2018-07-27 VITALS — SYSTOLIC BLOOD PRESSURE: 137 MMHG | DIASTOLIC BLOOD PRESSURE: 63 MMHG
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
## 2018-07-27 DIAGNOSIS — I10: ICD-10-CM
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
## 2018-07-27 DIAGNOSIS — I50.9: ICD-10-CM

## 2018-07-27 DIAGNOSIS — S52.602A: Primary | ICD-10-CM
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
## 2018-08-04 ENCOUNTER — HOSPITAL ENCOUNTER (EMERGENCY)
Dept: HOSPITAL 84 - D.ER | Age: 73
Discharge: HOME | End: 2018-08-04
Payer: MEDICARE

## 2018-08-04 VITALS
WEIGHT: 145.3 LBS | WEIGHT: 145.3 LBS | HEIGHT: 66 IN | BODY MASS INDEX: 23.35 KG/M2 | HEIGHT: 66 IN | BODY MASS INDEX: 23.35 KG/M2

## 2018-08-04 VITALS — DIASTOLIC BLOOD PRESSURE: 52 MMHG | SYSTOLIC BLOOD PRESSURE: 138 MMHG

## 2018-08-04 DIAGNOSIS — I50.9: ICD-10-CM

## 2018-08-04 DIAGNOSIS — S52.612G: Primary | ICD-10-CM
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
## 2018-08-04 DIAGNOSIS — I10: ICD-10-CM

## 2018-08-28 ENCOUNTER — HOSPITAL ENCOUNTER (INPATIENT)
Dept: HOSPITAL 84 - D.ER | Age: 73
LOS: 3 days | Discharge: HOME HEALTH | DRG: 309 | End: 2018-08-31
Attending: FAMILY MEDICINE | Admitting: FAMILY MEDICINE
Payer: MEDICARE

## 2018-08-28 VITALS
BODY MASS INDEX: 22.71 KG/M2 | WEIGHT: 141.3 LBS | BODY MASS INDEX: 22.71 KG/M2 | HEIGHT: 66 IN | HEIGHT: 66 IN | WEIGHT: 141.3 LBS | BODY MASS INDEX: 22.71 KG/M2

## 2018-08-28 VITALS — DIASTOLIC BLOOD PRESSURE: 61 MMHG | SYSTOLIC BLOOD PRESSURE: 138 MMHG

## 2018-08-28 DIAGNOSIS — Z87.891: ICD-10-CM

## 2018-08-28 DIAGNOSIS — R00.1: Primary | ICD-10-CM

## 2018-08-28 DIAGNOSIS — R55: ICD-10-CM

## 2018-08-28 DIAGNOSIS — N18.3: ICD-10-CM

## 2018-08-28 DIAGNOSIS — I50.9: ICD-10-CM

## 2018-08-28 DIAGNOSIS — I42.9: ICD-10-CM

## 2018-08-28 DIAGNOSIS — I13.0: ICD-10-CM

## 2018-08-28 LAB
ALBUMIN SERPL-MCNC: 3.3 G/DL (ref 3.4–5)
ALP SERPL-CCNC: 104 U/L (ref 46–116)
ALT SERPL-CCNC: 38 U/L (ref 10–68)
ANION GAP SERPL CALC-SCNC: 9.6 MMOL/L (ref 8–16)
APPEARANCE UR: CLEAR
APTT BLD: 29.3 SECONDS (ref 22.8–39.4)
BASOPHILS NFR BLD AUTO: 0.4 % (ref 0–2)
BILIRUB SERPL-MCNC: 0.61 MG/DL (ref 0.2–1.3)
BILIRUB SERPL-MCNC: NEGATIVE MG/DL
BUN SERPL-MCNC: 23 MG/DL (ref 7–18)
CALCIUM SERPL-MCNC: 8.8 MG/DL (ref 8.5–10.1)
CHLORIDE SERPL-SCNC: 102 MMOL/L (ref 98–107)
CK MB SERPL-MCNC: 0.9 U/L (ref 0–3.6)
CK SERPL-CCNC: 63 UL (ref 21–232)
CO2 SERPL-SCNC: 26.6 MMOL/L (ref 21–32)
COLOR UR: YELLOW
CREAT SERPL-MCNC: 2.5 MG/DL (ref 0.6–1.3)
EOSINOPHIL NFR BLD: 1.7 % (ref 0–7)
ERYTHROCYTE [DISTWIDTH] IN BLOOD BY AUTOMATED COUNT: 16.1 % (ref 11.5–14.5)
GLOBULIN SER-MCNC: 4.5 G/L
GLUCOSE SERPL-MCNC: 109 MG/DL (ref 74–106)
GLUCOSE SERPL-MCNC: NEGATIVE MG/DL
HCT VFR BLD CALC: 38.8 % (ref 42–54)
HGB BLD-MCNC: 13 G/DL (ref 13.5–17.5)
IMM GRANULOCYTES NFR BLD: 0.1 % (ref 0–5)
INR PPP: 1.01 (ref 0.85–1.17)
KETONES UR STRIP-MCNC: NEGATIVE MG/DL
LYMPHOCYTES NFR BLD AUTO: 21.5 % (ref 15–50)
MCH RBC QN AUTO: 28.7 PG (ref 26–34)
MCHC RBC AUTO-ENTMCNC: 33.5 G/DL (ref 31–37)
MCV RBC: 85.7 FL (ref 80–100)
MONOCYTES NFR BLD: 12.6 % (ref 2–11)
NEUTROPHILS NFR BLD AUTO: 63.7 % (ref 40–80)
NITRITE UR-MCNC: NEGATIVE MG/ML
OSMOLALITY SERPL CALC.SUM OF ELEC: 274 MOSM/KG (ref 275–300)
PH UR STRIP: 5 [PH] (ref 5–6)
PLATELET # BLD: 244 10X3/UL (ref 130–400)
PMV BLD AUTO: 10.3 FL (ref 7.4–10.4)
POTASSIUM SERPL-SCNC: 3.2 MMOL/L (ref 3.5–5.1)
PROT SERPL-MCNC: 7.8 G/DL (ref 6.4–8.2)
PROT UR-MCNC: NEGATIVE MG/DL
PROTHROMBIN TIME: 12.9 SECONDS (ref 11.6–15)
RBC # BLD AUTO: 4.53 10X6/UL (ref 4.2–6.1)
SODIUM SERPL-SCNC: 135 MMOL/L (ref 136–145)
SP GR UR STRIP: 1.01 (ref 1–1.02)
TROPONIN I SERPL-MCNC: < 0.017 NG/ML (ref 0–0.06)
UROBILINOGEN UR-MCNC: NORMAL MG/DL
WBC # BLD AUTO: 7.1 10X3/UL (ref 4.8–10.8)

## 2018-08-29 VITALS — DIASTOLIC BLOOD PRESSURE: 46 MMHG | SYSTOLIC BLOOD PRESSURE: 104 MMHG

## 2018-08-29 VITALS — DIASTOLIC BLOOD PRESSURE: 52 MMHG | SYSTOLIC BLOOD PRESSURE: 128 MMHG

## 2018-08-29 VITALS — DIASTOLIC BLOOD PRESSURE: 57 MMHG | SYSTOLIC BLOOD PRESSURE: 126 MMHG

## 2018-08-29 VITALS — SYSTOLIC BLOOD PRESSURE: 131 MMHG | DIASTOLIC BLOOD PRESSURE: 57 MMHG

## 2018-08-29 VITALS — DIASTOLIC BLOOD PRESSURE: 52 MMHG | SYSTOLIC BLOOD PRESSURE: 122 MMHG

## 2018-08-29 VITALS — SYSTOLIC BLOOD PRESSURE: 131 MMHG | DIASTOLIC BLOOD PRESSURE: 59 MMHG

## 2018-08-29 VITALS — SYSTOLIC BLOOD PRESSURE: 138 MMHG | DIASTOLIC BLOOD PRESSURE: 61 MMHG

## 2018-08-29 LAB
ALBUMIN SERPL-MCNC: 2.7 G/DL (ref 3.4–5)
ALP SERPL-CCNC: 97 U/L (ref 46–116)
ALT SERPL-CCNC: 35 U/L (ref 10–68)
ANION GAP SERPL CALC-SCNC: 8 MMOL/L (ref 8–16)
BASOPHILS NFR BLD AUTO: 0.3 % (ref 0–2)
BILIRUB SERPL-MCNC: 0.52 MG/DL (ref 0.2–1.3)
BUN SERPL-MCNC: 21 MG/DL (ref 7–18)
CALCIUM SERPL-MCNC: 8.3 MG/DL (ref 8.5–10.1)
CHLORIDE SERPL-SCNC: 105 MMOL/L (ref 98–107)
CO2 SERPL-SCNC: 29.8 MMOL/L (ref 21–32)
CREAT SERPL-MCNC: 2.2 MG/DL (ref 0.6–1.3)
EOSINOPHIL NFR BLD: 2.2 % (ref 0–7)
ERYTHROCYTE [DISTWIDTH] IN BLOOD BY AUTOMATED COUNT: 16.3 % (ref 11.5–14.5)
GLOBULIN SER-MCNC: 3.8 G/L
GLUCOSE SERPL-MCNC: 98 MG/DL (ref 74–106)
HCT VFR BLD CALC: 37 % (ref 42–54)
HGB BLD-MCNC: 12.2 G/DL (ref 13.5–17.5)
IMM GRANULOCYTES NFR BLD: 0.2 % (ref 0–5)
LYMPHOCYTES NFR BLD AUTO: 21.5 % (ref 15–50)
MCH RBC QN AUTO: 28.6 PG (ref 26–34)
MCHC RBC AUTO-ENTMCNC: 33 G/DL (ref 31–37)
MCV RBC: 86.7 FL (ref 80–100)
MONOCYTES NFR BLD: 11.3 % (ref 2–11)
NEUTROPHILS NFR BLD AUTO: 64.5 % (ref 40–80)
OSMOLALITY SERPL CALC.SUM OF ELEC: 280 MOSM/KG (ref 275–300)
PLATELET # BLD: 238 10X3/UL (ref 130–400)
PMV BLD AUTO: 10.4 FL (ref 7.4–10.4)
POTASSIUM SERPL-SCNC: 3.8 MMOL/L (ref 3.5–5.1)
PROT SERPL-MCNC: 6.5 G/DL (ref 6.4–8.2)
RBC # BLD AUTO: 4.27 10X6/UL (ref 4.2–6.1)
SODIUM SERPL-SCNC: 139 MMOL/L (ref 136–145)
WBC # BLD AUTO: 6 10X3/UL (ref 4.8–10.8)

## 2018-08-30 VITALS — DIASTOLIC BLOOD PRESSURE: 68 MMHG | SYSTOLIC BLOOD PRESSURE: 148 MMHG

## 2018-08-30 VITALS — SYSTOLIC BLOOD PRESSURE: 140 MMHG | DIASTOLIC BLOOD PRESSURE: 61 MMHG

## 2018-08-30 VITALS — DIASTOLIC BLOOD PRESSURE: 57 MMHG | SYSTOLIC BLOOD PRESSURE: 126 MMHG

## 2018-08-30 VITALS — DIASTOLIC BLOOD PRESSURE: 50 MMHG | SYSTOLIC BLOOD PRESSURE: 126 MMHG

## 2018-08-30 VITALS — DIASTOLIC BLOOD PRESSURE: 68 MMHG | SYSTOLIC BLOOD PRESSURE: 152 MMHG

## 2018-08-30 VITALS — SYSTOLIC BLOOD PRESSURE: 151 MMHG | DIASTOLIC BLOOD PRESSURE: 62 MMHG

## 2018-08-30 VITALS — SYSTOLIC BLOOD PRESSURE: 128 MMHG | DIASTOLIC BLOOD PRESSURE: 55 MMHG

## 2018-08-30 VITALS — DIASTOLIC BLOOD PRESSURE: 65 MMHG | SYSTOLIC BLOOD PRESSURE: 145 MMHG

## 2018-08-30 LAB
ALBUMIN SERPL-MCNC: 2.3 G/DL (ref 3.4–5)
ALP SERPL-CCNC: 87 U/L (ref 46–116)
ALT SERPL-CCNC: 26 U/L (ref 10–68)
ANION GAP SERPL CALC-SCNC: 6.4 MMOL/L (ref 8–16)
BASOPHILS NFR BLD AUTO: 0.2 % (ref 0–2)
BILIRUB SERPL-MCNC: 0.3 MG/DL (ref 0.2–1.3)
BUN SERPL-MCNC: 18 MG/DL (ref 7–18)
CALCIUM SERPL-MCNC: 8 MG/DL (ref 8.5–10.1)
CHLORIDE SERPL-SCNC: 108 MMOL/L (ref 98–107)
CO2 SERPL-SCNC: 30.2 MMOL/L (ref 21–32)
CREAT SERPL-MCNC: 1.8 MG/DL (ref 0.6–1.3)
EOSINOPHIL NFR BLD: 2.6 % (ref 0–7)
ERYTHROCYTE [DISTWIDTH] IN BLOOD BY AUTOMATED COUNT: 16.5 % (ref 11.5–14.5)
GLOBULIN SER-MCNC: 3.5 G/L
GLUCOSE SERPL-MCNC: 105 MG/DL (ref 74–106)
HCT VFR BLD CALC: 33 % (ref 42–54)
HGB BLD-MCNC: 10.6 G/DL (ref 13.5–17.5)
IMM GRANULOCYTES NFR BLD: 0.2 % (ref 0–5)
LYMPHOCYTES NFR BLD AUTO: 21.5 % (ref 15–50)
MCH RBC QN AUTO: 28.1 PG (ref 26–34)
MCHC RBC AUTO-ENTMCNC: 32.1 G/DL (ref 31–37)
MCV RBC: 87.5 FL (ref 80–100)
MONOCYTES NFR BLD: 9.2 % (ref 2–11)
NEUTROPHILS NFR BLD AUTO: 66.3 % (ref 40–80)
OSMOLALITY SERPL CALC.SUM OF ELEC: 282 MOSM/KG (ref 275–300)
PLATELET # BLD: 198 10X3/UL (ref 130–400)
PMV BLD AUTO: 10.2 FL (ref 7.4–10.4)
POTASSIUM SERPL-SCNC: 3.6 MMOL/L (ref 3.5–5.1)
PROT SERPL-MCNC: 5.8 G/DL (ref 6.4–8.2)
RBC # BLD AUTO: 3.77 10X6/UL (ref 4.2–6.1)
SODIUM SERPL-SCNC: 141 MMOL/L (ref 136–145)
WBC # BLD AUTO: 5.7 10X3/UL (ref 4.8–10.8)

## 2018-08-31 VITALS — DIASTOLIC BLOOD PRESSURE: 63 MMHG | SYSTOLIC BLOOD PRESSURE: 127 MMHG

## 2018-08-31 VITALS — SYSTOLIC BLOOD PRESSURE: 131 MMHG | DIASTOLIC BLOOD PRESSURE: 67 MMHG

## 2018-08-31 VITALS — SYSTOLIC BLOOD PRESSURE: 123 MMHG | DIASTOLIC BLOOD PRESSURE: 65 MMHG

## 2018-08-31 VITALS — DIASTOLIC BLOOD PRESSURE: 64 MMHG | SYSTOLIC BLOOD PRESSURE: 138 MMHG

## 2018-08-31 VITALS — SYSTOLIC BLOOD PRESSURE: 142 MMHG | DIASTOLIC BLOOD PRESSURE: 69 MMHG

## 2018-08-31 VITALS — SYSTOLIC BLOOD PRESSURE: 158 MMHG | DIASTOLIC BLOOD PRESSURE: 72 MMHG

## 2018-08-31 VITALS — SYSTOLIC BLOOD PRESSURE: 142 MMHG | DIASTOLIC BLOOD PRESSURE: 79 MMHG

## 2018-08-31 LAB
ALBUMIN SERPL-MCNC: 2.5 G/DL (ref 3.4–5)
ALP SERPL-CCNC: 98 U/L (ref 46–116)
ALT SERPL-CCNC: 27 U/L (ref 10–68)
ANION GAP SERPL CALC-SCNC: 4.3 MMOL/L (ref 8–16)
BASOPHILS NFR BLD AUTO: 0.2 % (ref 0–2)
BILIRUB SERPL-MCNC: 0.36 MG/DL (ref 0.2–1.3)
BUN SERPL-MCNC: 14 MG/DL (ref 7–18)
CALCIUM SERPL-MCNC: 8.4 MG/DL (ref 8.5–10.1)
CHLORIDE SERPL-SCNC: 108 MMOL/L (ref 98–107)
CO2 SERPL-SCNC: 31 MMOL/L (ref 21–32)
CREAT SERPL-MCNC: 1.5 MG/DL (ref 0.6–1.3)
EOSINOPHIL NFR BLD: 4.5 % (ref 0–7)
ERYTHROCYTE [DISTWIDTH] IN BLOOD BY AUTOMATED COUNT: 16.4 % (ref 11.5–14.5)
GLOBULIN SER-MCNC: 3.8 G/L
GLUCOSE SERPL-MCNC: 105 MG/DL (ref 74–106)
HCT VFR BLD CALC: 37.3 % (ref 42–54)
HGB BLD-MCNC: 12.4 G/DL (ref 13.5–17.5)
IMM GRANULOCYTES NFR BLD: 0.2 % (ref 0–5)
LYMPHOCYTES NFR BLD AUTO: 15.5 % (ref 15–50)
MCH RBC QN AUTO: 29.2 PG (ref 26–34)
MCHC RBC AUTO-ENTMCNC: 33.2 G/DL (ref 31–37)
MCV RBC: 88 FL (ref 80–100)
MONOCYTES NFR BLD: 10.2 % (ref 2–11)
NEUTROPHILS NFR BLD AUTO: 69.4 % (ref 40–80)
OSMOLALITY SERPL CALC.SUM OF ELEC: 278 MOSM/KG (ref 275–300)
PLATELET # BLD: 216 10X3/UL (ref 130–400)
PMV BLD AUTO: 10.1 FL (ref 7.4–10.4)
POTASSIUM SERPL-SCNC: 4.3 MMOL/L (ref 3.5–5.1)
PROT SERPL-MCNC: 6.3 G/DL (ref 6.4–8.2)
RBC # BLD AUTO: 4.24 10X6/UL (ref 4.2–6.1)
SODIUM SERPL-SCNC: 139 MMOL/L (ref 136–145)
WBC # BLD AUTO: 8.3 10X3/UL (ref 4.8–10.8)

## 2018-12-06 ENCOUNTER — HOSPITAL ENCOUNTER (OUTPATIENT)
Dept: HOSPITAL 84 - D.US | Age: 73
Discharge: HOME | End: 2018-12-06
Attending: INTERNAL MEDICINE
Payer: MEDICARE

## 2018-12-06 VITALS — BODY MASS INDEX: 22.2 KG/M2

## 2018-12-06 DIAGNOSIS — E87.6: ICD-10-CM

## 2018-12-06 DIAGNOSIS — I12.9: Primary | ICD-10-CM

## 2018-12-06 DIAGNOSIS — N18.3: ICD-10-CM

## 2019-04-05 ENCOUNTER — HOSPITAL ENCOUNTER (OUTPATIENT)
Dept: HOSPITAL 84 - D.CT | Age: 74
Discharge: HOME | End: 2019-04-05
Attending: FAMILY MEDICINE
Payer: MEDICARE

## 2019-04-05 VITALS — BODY MASS INDEX: 22.2 KG/M2

## 2019-04-05 DIAGNOSIS — R19.00: Primary | ICD-10-CM

## 2019-04-09 ENCOUNTER — HOSPITAL ENCOUNTER (INPATIENT)
Dept: HOSPITAL 84 - D.SDCHOLD | Age: 74
LOS: 2 days | Discharge: HOME HEALTH | DRG: 392 | End: 2019-04-11
Attending: INTERNAL MEDICINE | Admitting: INTERNAL MEDICINE
Payer: MEDICARE

## 2019-04-09 VITALS
BODY MASS INDEX: 25.1 KG/M2 | WEIGHT: 147 LBS | HEIGHT: 64 IN | BODY MASS INDEX: 25.1 KG/M2 | BODY MASS INDEX: 25.1 KG/M2

## 2019-04-09 VITALS — DIASTOLIC BLOOD PRESSURE: 68 MMHG | SYSTOLIC BLOOD PRESSURE: 110 MMHG

## 2019-04-09 VITALS — SYSTOLIC BLOOD PRESSURE: 108 MMHG | DIASTOLIC BLOOD PRESSURE: 41 MMHG

## 2019-04-09 DIAGNOSIS — I43: ICD-10-CM

## 2019-04-09 DIAGNOSIS — N18.3: ICD-10-CM

## 2019-04-09 DIAGNOSIS — K57.20: Primary | ICD-10-CM

## 2019-04-09 DIAGNOSIS — I50.22: ICD-10-CM

## 2019-04-09 DIAGNOSIS — I13.0: ICD-10-CM

## 2019-04-09 LAB
ALBUMIN SERPL-MCNC: 3.4 G/DL (ref 3.4–5)
ALP SERPL-CCNC: 114 U/L (ref 46–116)
ALT SERPL-CCNC: 50 U/L (ref 10–68)
ANION GAP SERPL CALC-SCNC: 10.1 MMOL/L (ref 8–16)
APPEARANCE UR: CLEAR
BASOPHILS NFR BLD AUTO: 0.3 % (ref 0–2)
BILIRUB SERPL-MCNC: 0.34 MG/DL (ref 0.2–1.3)
BILIRUB SERPL-MCNC: NEGATIVE MG/DL
BUN SERPL-MCNC: 37 MG/DL (ref 7–18)
CALCIUM SERPL-MCNC: 8.9 MG/DL (ref 8.5–10.1)
CHLORIDE SERPL-SCNC: 100 MMOL/L (ref 98–107)
CO2 SERPL-SCNC: 30.5 MMOL/L (ref 21–32)
COLOR UR: (no result)
CREAT SERPL-MCNC: 2.3 MG/DL (ref 0.6–1.3)
EOSINOPHIL NFR BLD: 2.2 % (ref 0–7)
ERYTHROCYTE [DISTWIDTH] IN BLOOD BY AUTOMATED COUNT: 16.8 % (ref 11.5–14.5)
GLOBULIN SER-MCNC: 4.7 G/L
GLUCOSE SERPL-MCNC: 116 MG/DL (ref 74–106)
GLUCOSE SERPL-MCNC: NEGATIVE MG/DL
HCT VFR BLD CALC: 39 % (ref 42–54)
HGB BLD-MCNC: 12.6 G/DL (ref 13.5–17.5)
IMM GRANULOCYTES NFR BLD: 0.1 % (ref 0–5)
KETONES UR STRIP-MCNC: NEGATIVE MG/DL
LYMPHOCYTES NFR BLD AUTO: 17 % (ref 15–50)
MCH RBC QN AUTO: 28.1 PG (ref 26–34)
MCHC RBC AUTO-ENTMCNC: 32.3 G/DL (ref 31–37)
MCV RBC: 86.9 FL (ref 80–100)
MONOCYTES NFR BLD: 8.1 % (ref 2–11)
NEUTROPHILS NFR BLD AUTO: 72.3 % (ref 40–80)
NITRITE UR-MCNC: NEGATIVE MG/ML
OSMOLALITY SERPL CALC.SUM OF ELEC: 281 MOSM/KG (ref 275–300)
PH UR STRIP: 6 [PH] (ref 5–6)
PLATELET # BLD: 290 10X3/UL (ref 130–400)
PMV BLD AUTO: 9.8 FL (ref 7.4–10.4)
POTASSIUM SERPL-SCNC: 4.6 MMOL/L (ref 3.5–5.1)
PROT SERPL-MCNC: 8.1 G/DL (ref 6.4–8.2)
PROT UR-MCNC: NEGATIVE MG/DL
RBC # BLD AUTO: 4.49 10X6/UL (ref 4.2–6.1)
SODIUM SERPL-SCNC: 136 MMOL/L (ref 136–145)
SP GR UR STRIP: 1 (ref 1–1.02)
UROBILINOGEN UR-MCNC: NORMAL MG/DL
WBC # BLD AUTO: 9.6 10X3/UL (ref 4.8–10.8)

## 2019-04-10 VITALS — SYSTOLIC BLOOD PRESSURE: 130 MMHG | DIASTOLIC BLOOD PRESSURE: 61 MMHG

## 2019-04-10 VITALS — DIASTOLIC BLOOD PRESSURE: 60 MMHG | SYSTOLIC BLOOD PRESSURE: 121 MMHG

## 2019-04-10 VITALS — DIASTOLIC BLOOD PRESSURE: 64 MMHG | SYSTOLIC BLOOD PRESSURE: 129 MMHG

## 2019-04-10 VITALS — SYSTOLIC BLOOD PRESSURE: 135 MMHG | DIASTOLIC BLOOD PRESSURE: 66 MMHG

## 2019-04-10 VITALS — SYSTOLIC BLOOD PRESSURE: 133 MMHG | DIASTOLIC BLOOD PRESSURE: 61 MMHG

## 2019-04-10 LAB
ALBUMIN SERPL-MCNC: 2.6 G/DL (ref 3.4–5)
ALP SERPL-CCNC: 90 U/L (ref 46–116)
ALT SERPL-CCNC: 47 U/L (ref 10–68)
ANION GAP SERPL CALC-SCNC: 10.2 MMOL/L (ref 8–16)
APPEARANCE UR: CLEAR
BASOPHILS NFR BLD AUTO: 0.5 % (ref 0–2)
BILIRUB SERPL-MCNC: 0.42 MG/DL (ref 0.2–1.3)
BILIRUB SERPL-MCNC: NEGATIVE MG/DL
BUN SERPL-MCNC: 27 MG/DL (ref 7–18)
CALCIUM SERPL-MCNC: 8.3 MG/DL (ref 8.5–10.1)
CHLORIDE SERPL-SCNC: 108 MMOL/L (ref 98–107)
CO2 SERPL-SCNC: 26.7 MMOL/L (ref 21–32)
COLOR UR: YELLOW
CREAT SERPL-MCNC: 2 MG/DL (ref 0.6–1.3)
EOSINOPHIL NFR BLD: 3.9 % (ref 0–7)
ERYTHROCYTE [DISTWIDTH] IN BLOOD BY AUTOMATED COUNT: 16.5 % (ref 11.5–14.5)
GLOBULIN SER-MCNC: 3.9 G/L
GLUCOSE SERPL-MCNC: 94 MG/DL (ref 74–106)
GLUCOSE SERPL-MCNC: NEGATIVE MG/DL
HCT VFR BLD CALC: 34.3 % (ref 42–54)
HGB BLD-MCNC: 10.8 G/DL (ref 13.5–17.5)
IMM GRANULOCYTES NFR BLD: 0.2 % (ref 0–5)
KETONES UR STRIP-MCNC: NEGATIVE MG/DL
LYMPHOCYTES NFR BLD AUTO: 20.8 % (ref 15–50)
MCH RBC QN AUTO: 27.3 PG (ref 26–34)
MCHC RBC AUTO-ENTMCNC: 31.5 G/DL (ref 31–37)
MCV RBC: 86.6 FL (ref 80–100)
MONOCYTES NFR BLD: 10.6 % (ref 2–11)
NEUTROPHILS NFR BLD AUTO: 64 % (ref 40–80)
NITRITE UR-MCNC: NEGATIVE MG/ML
OSMOLALITY SERPL CALC.SUM OF ELEC: 285 MOSM/KG (ref 275–300)
PH UR STRIP: 8 [PH] (ref 5–6)
PLATELET # BLD: 248 10X3/UL (ref 130–400)
PMV BLD AUTO: 10.2 FL (ref 7.4–10.4)
POTASSIUM SERPL-SCNC: 3.9 MMOL/L (ref 3.5–5.1)
PROT SERPL-MCNC: 6.5 G/DL (ref 6.4–8.2)
PROT UR-MCNC: NEGATIVE MG/DL
RBC # BLD AUTO: 3.96 10X6/UL (ref 4.2–6.1)
SODIUM SERPL-SCNC: 141 MMOL/L (ref 136–145)
SP GR UR STRIP: 1.01 (ref 1–1.02)
UROBILINOGEN UR-MCNC: NORMAL MG/DL
WBC # BLD AUTO: 5.8 10X3/UL (ref 4.8–10.8)

## 2019-04-10 NOTE — NUR
PT RESTING IN BED. NO S/S OF ACUTE DISTRESS. PT HAD X1 LARGE BM LOOSE RUNNY
STOOL NOTED. CL IN PLACE.

## 2019-04-11 VITALS — DIASTOLIC BLOOD PRESSURE: 67 MMHG | SYSTOLIC BLOOD PRESSURE: 128 MMHG

## 2019-04-11 VITALS — DIASTOLIC BLOOD PRESSURE: 51 MMHG | SYSTOLIC BLOOD PRESSURE: 109 MMHG

## 2019-04-11 VITALS — SYSTOLIC BLOOD PRESSURE: 126 MMHG | DIASTOLIC BLOOD PRESSURE: 70 MMHG

## 2019-04-11 VITALS — DIASTOLIC BLOOD PRESSURE: 63 MMHG | SYSTOLIC BLOOD PRESSURE: 136 MMHG

## 2019-04-11 LAB
ANION GAP SERPL CALC-SCNC: 13.2 MMOL/L (ref 8–16)
BASOPHILS NFR BLD AUTO: 0.7 % (ref 0–2)
BUN SERPL-MCNC: 18 MG/DL (ref 7–18)
CALCIUM SERPL-MCNC: 8.4 MG/DL (ref 8.5–10.1)
CHLORIDE SERPL-SCNC: 108 MMOL/L (ref 98–107)
CO2 SERPL-SCNC: 22.9 MMOL/L (ref 21–32)
CREAT SERPL-MCNC: 1.5 MG/DL (ref 0.6–1.3)
EOSINOPHIL NFR BLD: 3.9 % (ref 0–7)
ERYTHROCYTE [DISTWIDTH] IN BLOOD BY AUTOMATED COUNT: 16.3 % (ref 11.5–14.5)
GLUCOSE SERPL-MCNC: 82 MG/DL (ref 74–106)
HCT VFR BLD CALC: 34.4 % (ref 42–54)
HGB BLD-MCNC: 10.9 G/DL (ref 13.5–17.5)
IMM GRANULOCYTES NFR BLD: 0.2 % (ref 0–5)
LYMPHOCYTES NFR BLD AUTO: 14.8 % (ref 15–50)
MCH RBC QN AUTO: 27.3 PG (ref 26–34)
MCHC RBC AUTO-ENTMCNC: 31.7 G/DL (ref 31–37)
MCV RBC: 86.2 FL (ref 80–100)
MONOCYTES NFR BLD: 8.3 % (ref 2–11)
NEUTROPHILS NFR BLD AUTO: 72.1 % (ref 40–80)
OSMOLALITY SERPL CALC.SUM OF ELEC: 279 MOSM/KG (ref 275–300)
PLATELET # BLD: 263 10X3/UL (ref 130–400)
PMV BLD AUTO: 10.1 FL (ref 7.4–10.4)
POTASSIUM SERPL-SCNC: 4.1 MMOL/L (ref 3.5–5.1)
RBC # BLD AUTO: 3.99 10X6/UL (ref 4.2–6.1)
SODIUM SERPL-SCNC: 140 MMOL/L (ref 136–145)
WBC # BLD AUTO: 6.1 10X3/UL (ref 4.8–10.8)

## 2019-04-11 NOTE — MORECARE
CASE MANAGEMENT DISCHARGE SUMMARY
 
 
PATIENT: BARRY TRUONG               UNIT: [id number]
ACCOUNT#: [id number]                       ADM DATE: 19
AGE: 74     : [birth year]  SEX: M            ROOM/BED: D.2220    
AUTHOR: FÁTIMADOC                             PHYSICIAN:                               
 
REFERRING PHYSICIAN: DELMIS HOLM MD               
DATE OF SERVICE: 19
Discharge Plan
 
 
Patient Name: BARRY TRUONG
Facility: [facility]:[place]
Encounter #: [id number]
Medical Record #: [id number]
: 1945
Planned Disposition: Home Health Service
Anticipated Discharge Date: 
 
Discharge Date: 
Expected LOS: 
Initial Reviewer: [id number]
Initial Review Date: 2019
Generated: 19  12:34 pm 
Comments
 
DCP- Discharge Planning
 
Updated by [id number]: Shu Lacey on 19  10:32 am CT
Patient Name: BARRY TRUONG                                     
Admission Status: Elective   
Accout number: [id number]                              
Admission Date: 2019   
: 1945                                                        
Admission Diagnosis:   
Attending: DELMIS HOLM                                                
Current LOS:  2   
  
Anticipated DC Date:    
Planned Disposition: Home Health Service   
Primary Insurance: [employer] CHOICE PPO MCR ADVANT   
  
  
Discharge Planning Comments:   
  
CM met with patient to complete initial dc planning assessment.  CM educated 
patient on the CM role and verbal consent given by patient to complete 
assessment.   Patient lives at home where he is independent with his care.  
 
At discharge patient plans to return home and feels this is a safe discharge. 
 CM discussed availability of home health, rehab services, and medical 
equipment. Patient has a cane and a walker. He uses meals on wheels. He 
stated that his daughter will be the one to drive him home today & he would 
like home health. [facility] with [facility], will send the referral. I spoke with 
Chana.  CM will continue to follow and will assist as needed with dc 
plans/needs.    
  
  
  
  
: Shu Lacey
 DCPIA - Discharge Planning Initial Assessment
 
Updated by [email or web address]: Shu Lacey on 19  11:29 am
*  Is the patient Alert and Oriented?
Yes
*  How many steps to enter\exit or inside your home?
 
*  PCP
[facility]
*  Pharmacy
[facility]
*  Preadmission Environment
Home Alone
*  ADLs
Independent
*  Equipment
Cane
Rolling Walker
*  List name and contact numbers for known caregivers / representatives who 
currently or will assist patient after discharge:
NAE   [phone]
 
*  Verbal permission to speak to the caregivers and representatives has been 
obtained from the patient.
N/A
*  Community resources currently utilized
[facility]
*  Additional services required to return to the preadmission environment?
Yes
*  Can the patient safely return to the preadmission environment?
Yes
*  Has this patient been hospitalized within the prior 30 days at any 
hospital?
No
 
 
 
 
 
Coverage Notice
 
 
Reviewer: [id number] Jenny Lacey
 
Notice Issued Date-Time: 2019  11:20
Notice Type: Patient Choice Letter
 
Notice Delivered To: Patient
Relationship to Patient: 
Representative Name: 
 
Delivery Method: HAND - Hand Delivered
Bell Days:
Prior Verbal Notification: 
 
Recipient Understood Notice: Yes
Recipient Signature: Yes
Med Rec Note Co-signed by Attending:
 
Coverage Notice Comment:  home health [facility]
Patient Name: BARRY TRUONG
Encounter #: [id number]
Page [id number]
 
 
 
 
 
Electronically Signed by TANK SHINE on 19 at 1134
 
 
 
 
 
 
**All edits/amendments must be made on the electronic document**
 
DICTATION DATE: 19 1134     : DILMA  19 1134     
RPT#: [id number]                                DC DATE:        
                                               STATUS: ADM IN  
[facility]
[address] [place] [address]
***END OF REPORT***

## 2019-04-11 NOTE — NUR
DC IV WITH TIP IN TACT. DC INSTRUCTIONS AND EDUCATION DONE WITH PT. ALL
BELONGINGS SENT WITH DAUGHTER. VOLUNTEER TOOK PT DOWN VIA WC. NO S/S OF ACUTE
DISTRESS.

## 2019-04-16 ENCOUNTER — HOSPITAL ENCOUNTER (INPATIENT)
Dept: HOSPITAL 84 - D.ER | Age: 74
LOS: 5 days | Discharge: HOME HEALTH | DRG: 392 | End: 2019-04-21
Attending: INTERNAL MEDICINE | Admitting: INTERNAL MEDICINE
Payer: MEDICARE

## 2019-04-16 VITALS
WEIGHT: 147.31 LBS | BODY MASS INDEX: 25.15 KG/M2 | WEIGHT: 147.31 LBS | BODY MASS INDEX: 25.15 KG/M2 | HEIGHT: 64 IN | BODY MASS INDEX: 25.15 KG/M2 | HEIGHT: 64 IN | BODY MASS INDEX: 25.15 KG/M2

## 2019-04-16 VITALS — DIASTOLIC BLOOD PRESSURE: 66 MMHG | SYSTOLIC BLOOD PRESSURE: 127 MMHG

## 2019-04-16 VITALS — SYSTOLIC BLOOD PRESSURE: 120 MMHG | DIASTOLIC BLOOD PRESSURE: 60 MMHG

## 2019-04-16 DIAGNOSIS — K57.92: Primary | ICD-10-CM

## 2019-04-16 DIAGNOSIS — N18.3: ICD-10-CM

## 2019-04-16 DIAGNOSIS — I13.0: ICD-10-CM

## 2019-04-16 DIAGNOSIS — N17.9: ICD-10-CM

## 2019-04-16 DIAGNOSIS — I42.9: ICD-10-CM

## 2019-04-16 DIAGNOSIS — I50.20: ICD-10-CM

## 2019-04-16 DIAGNOSIS — R42: ICD-10-CM

## 2019-04-16 LAB
ALBUMIN SERPL-MCNC: 3.1 G/DL (ref 3.4–5)
ALP SERPL-CCNC: 85 U/L (ref 46–116)
ALT SERPL-CCNC: 35 U/L (ref 10–68)
AMYLASE SERPL-CCNC: 32 U/L (ref 25–115)
ANION GAP SERPL CALC-SCNC: 12 MMOL/L (ref 8–16)
APPEARANCE UR: CLEAR
BACTERIA #/AREA URNS HPF: (no result) /HPF
BASOPHILS NFR BLD AUTO: 0.1 % (ref 0–2)
BILIRUB SERPL-MCNC: 0.44 MG/DL (ref 0.2–1.3)
BILIRUB SERPL-MCNC: NEGATIVE MG/DL
BUN SERPL-MCNC: 24 MG/DL (ref 7–18)
CALCIUM SERPL-MCNC: 8.7 MG/DL (ref 8.5–10.1)
CHLORIDE SERPL-SCNC: 105 MMOL/L (ref 98–107)
CK MB SERPL-MCNC: 1 U/L (ref 0–3.6)
CK SERPL-CCNC: 81 UL (ref 21–232)
CO2 SERPL-SCNC: 26.3 MMOL/L (ref 21–32)
COLOR UR: YELLOW
CREAT SERPL-MCNC: 2.2 MG/DL (ref 0.6–1.3)
EOSINOPHIL NFR BLD: 0.2 % (ref 0–7)
ERYTHROCYTE [DISTWIDTH] IN BLOOD BY AUTOMATED COUNT: 16.9 % (ref 11.5–14.5)
GLOBULIN SER-MCNC: 3.9 G/L
GLUCOSE SERPL-MCNC: 116 MG/DL (ref 74–106)
GLUCOSE SERPL-MCNC: NEGATIVE MG/DL
HCT VFR BLD CALC: 35.6 % (ref 42–54)
HGB BLD-MCNC: 12 G/DL (ref 13.5–17.5)
IMM GRANULOCYTES NFR BLD: 0.1 % (ref 0–5)
KETONES UR STRIP-MCNC: NEGATIVE MG/DL
LIPASE SERPL-CCNC: 61 U/L (ref 73–393)
LYMPHOCYTES NFR BLD AUTO: 12.3 % (ref 15–50)
MCH RBC QN AUTO: 28.2 PG (ref 26–34)
MCHC RBC AUTO-ENTMCNC: 33.7 G/DL (ref 31–37)
MCV RBC: 83.6 FL (ref 80–100)
MONOCYTES NFR BLD: 10.4 % (ref 2–11)
NEUTROPHILS NFR BLD AUTO: 76.9 % (ref 40–80)
NITRITE UR-MCNC: NEGATIVE MG/ML
NT-PROBNP SERPL-MCNC: 1668 PG/ML (ref 0–450)
OSMOLALITY SERPL CALC.SUM OF ELEC: 283 MOSM/KG (ref 275–300)
PH UR STRIP: 5 [PH] (ref 5–6)
PLATELET # BLD: 264 10X3/UL (ref 130–400)
PMV BLD AUTO: 9.5 FL (ref 7.4–10.4)
POTASSIUM SERPL-SCNC: 3.3 MMOL/L (ref 3.5–5.1)
PROT SERPL-MCNC: 7 G/DL (ref 6.4–8.2)
PROT UR-MCNC: (no result) MG/DL
RBC # BLD AUTO: 4.26 10X6/UL (ref 4.2–6.1)
RBC #/AREA URNS HPF: (no result) /HPF (ref 0–5)
SODIUM SERPL-SCNC: 140 MMOL/L (ref 136–145)
SP GR UR STRIP: 1.02 (ref 1–1.02)
TROPONIN I SERPL-MCNC: 0.05 NG/ML (ref 0–0.06)
UROBILINOGEN UR-MCNC: NORMAL MG/DL
WBC # BLD AUTO: 8.5 10X3/UL (ref 4.8–10.8)
WBC #/AREA URNS HPF: (no result) /HPF (ref 0–5)

## 2019-04-16 NOTE — EC
PATIENT:BARRY TRUONG           DATE OF SERVICE: 04/16/19
SEX: M                                  MEDICAL RECORD: [id number]
DATE OF BIRTH: [birth year]                        LOCATION:D.M2      D.210
AGE OF PATIENT: 74                             ADMISSION DATE: 04/16/19
 
REFERRING PHYSICIAN:                               
 
INTERPRETING PHYSICIAN: RE HEREDIA MD             
 
 
 
                             ECHOCARDIOGRAM REPORT
  ECHO CHARGES 4               ECHO COMPLETE                 Date: 04/18/19
 
 
 
CLINICAL DIAGNOSIS: VERTIGO   HX OF IRREGULAR     
                    HEART BEAT                    
                         ECHOCARDIOGRAPHIC MEASUREMENTS
      (adult normal given)
   AC root (d.<3.7cm) 2.7  cm   LV Septum d (<1.2 cm> 1.2  cm
      Valve Excursion 1.3  cm     LV Septum (systole) 1.3  cm
Left Atria (s.<4.0cm> 4.0  cm          LVPW d(<1.2cm) 1.3  cm
        RV (d.<2.3cm) 4.1  cm           LVPW (sytole) 1.4  cm
  LV diastole(<5.6CM) 6.7  cm       MV E-F(>70mm/sec)      cm
           LV systole 5.6  cm           LVOT Diameter 1.8  cm
       MV exc.(>10mm) 1.6  cm
Est.ejection fraction (50-75%)     %
 
   DOPPLER:
     LVIT      cm/sec A 108  cm/sec E 75.0  cm/sec
       LA      cm/sec      RVSP 49   mmHg
     LVOT 76   cm/sec   AOP1/2T 475  m/s
  Asc. Ao 197  cm/sec
     RVOT      cm/sec
       RA      cm/sec
       PA      cm/sec
 AV Gradient Peak 15.45mmHg  AV Mean 8.75 mmHg  AV Area 1.0  cm
 MV Gradient Peak 4.74 mmHg  MV Mean 1.65 mmHg  MV Area      cm
   COMMENTS:                                              
 
 
 Cardiac Sonographer: Rebecca CABA              
      Cardiologist: 1          Dr. Heredia                
             TAPE# PACS           
                                       Pericardial Effusion N                        
 
 
DATE OF SERVICE:  04/18/2019
 
FINDINGS:
1.  Left ventricular chamber size is mildly dilated.  Left ventricular systolic
function is mildly reduced.  Overall ejection fraction is 40% to 45%.
2.  Left atrium is upper limits of normal at 4.0 cm.  Right atrium and right
ventricular chamber sizes are mildly dilated.
3.  Valvular structures:  Aortic valve demonstrates mild calcific aortic
stenosis.  Valve area calculates to 1.5 cm-squared with gradient of 15 mm across
the valve.  The remaining valvular structures have normal structure and motion.
 
 
 
ECHOCARDIOGRAM REPORT                          [id number]    BARRY TRUONG   
 
 
4.  Doppler interrogation elsewise reveals mild-to-moderate aortic
insufficiency, mild-to-moderate tricuspid regurgitation, and mild-to-moderate
mitral regurgitation.  No other valvular insufficiency or stenosis.  Pulmonary
systolic pressure is estimated at 50 mmHg.
5.  No evidence of pericardial effusion or left ventricular thrombus.
 
TRANSINT:[id number] Voice Confirmation ID: [id number] DOCUMENT ID: [id number]
                                           
                                           RE HEREDIA MD             
 
 
 
 
 
 
 
 
 
 
 
 
 
 
 
 
 
 
 
 
 
 
 
 
 
 
 
 
 
 
 
 
 
 
 
 
 
CC:                                                             [id number]
DICTATION DATE: 04/18/19 1651     :     04/18/19 1914      ADM IN  
                                                                              
[facility]                                          
[address] [id number]

## 2019-04-17 VITALS — DIASTOLIC BLOOD PRESSURE: 61 MMHG | SYSTOLIC BLOOD PRESSURE: 110 MMHG

## 2019-04-17 VITALS — DIASTOLIC BLOOD PRESSURE: 86 MMHG | SYSTOLIC BLOOD PRESSURE: 112 MMHG

## 2019-04-17 VITALS — SYSTOLIC BLOOD PRESSURE: 130 MMHG | DIASTOLIC BLOOD PRESSURE: 86 MMHG

## 2019-04-17 VITALS — DIASTOLIC BLOOD PRESSURE: 59 MMHG | SYSTOLIC BLOOD PRESSURE: 102 MMHG

## 2019-04-17 VITALS — DIASTOLIC BLOOD PRESSURE: 77 MMHG | SYSTOLIC BLOOD PRESSURE: 115 MMHG

## 2019-04-17 VITALS — DIASTOLIC BLOOD PRESSURE: 71 MMHG | SYSTOLIC BLOOD PRESSURE: 122 MMHG

## 2019-04-17 VITALS — SYSTOLIC BLOOD PRESSURE: 138 MMHG | DIASTOLIC BLOOD PRESSURE: 76 MMHG

## 2019-04-17 LAB
ANION GAP SERPL CALC-SCNC: 12.7 MMOL/L (ref 8–16)
BASOPHILS NFR BLD AUTO: 0.1 % (ref 0–2)
BUN SERPL-MCNC: 23 MG/DL (ref 7–18)
CALCIUM SERPL-MCNC: 8.6 MG/DL (ref 8.5–10.1)
CHLORIDE SERPL-SCNC: 105 MMOL/L (ref 98–107)
CK MB SERPL-MCNC: 0.9 U/L (ref 0–3.6)
CK MB SERPL-MCNC: 1 U/L (ref 0–3.6)
CK SERPL-CCNC: 107 UL (ref 21–232)
CK SERPL-CCNC: 107 UL (ref 21–232)
CO2 SERPL-SCNC: 24.5 MMOL/L (ref 21–32)
CREAT SERPL-MCNC: 2.1 MG/DL (ref 0.6–1.3)
EOSINOPHIL NFR BLD: 0.2 % (ref 0–7)
ERYTHROCYTE [DISTWIDTH] IN BLOOD BY AUTOMATED COUNT: 16.8 % (ref 11.5–14.5)
GLUCOSE SERPL-MCNC: 100 MG/DL (ref 74–106)
HCT VFR BLD CALC: 33.4 % (ref 42–54)
HGB BLD-MCNC: 11.1 G/DL (ref 13.5–17.5)
IMM GRANULOCYTES NFR BLD: 0.2 % (ref 0–5)
LYMPHOCYTES NFR BLD AUTO: 15.4 % (ref 15–50)
MAGNESIUM SERPL-MCNC: 1.8 MG/DL (ref 1.8–2.4)
MCH RBC QN AUTO: 27.5 PG (ref 26–34)
MCHC RBC AUTO-ENTMCNC: 33.2 G/DL (ref 31–37)
MCV RBC: 82.7 FL (ref 80–100)
MONOCYTES NFR BLD: 10.3 % (ref 2–11)
NEUTROPHILS NFR BLD AUTO: 73.8 % (ref 40–80)
OSMOLALITY SERPL CALC.SUM OF ELEC: 281 MOSM/KG (ref 275–300)
PLATELET # BLD: 275 10X3/UL (ref 130–400)
PMV BLD AUTO: 10 FL (ref 7.4–10.4)
POTASSIUM SERPL-SCNC: 3.2 MMOL/L (ref 3.5–5.1)
RBC # BLD AUTO: 4.04 10X6/UL (ref 4.2–6.1)
SODIUM SERPL-SCNC: 139 MMOL/L (ref 136–145)
TROPONIN I SERPL-MCNC: 0.06 NG/ML (ref 0–0.06)
TROPONIN I SERPL-MCNC: 0.07 NG/ML (ref 0–0.06)
WBC # BLD AUTO: 8.1 10X3/UL (ref 4.8–10.8)

## 2019-04-17 NOTE — NUR
RECIEVED BEDSIDE REPORT. VSS, AA0X3, RR EVEN AND UNLABORED. PT HAD AN
INCONTINENT BM WHEN HE RETURNED FROM CT SCAN. CLEANED PT UP AND HELP PT PUT
NEW DEPEND. PT ABDOMEN APPEARS DISTENDED ON ASSESSMENT, PT STATES ITS NOT
HURTING AT THIS MOMENT. FAMILY AT BEDSIDE. CL IN REACH, BED IN LOW, SR UP X2.

## 2019-04-17 NOTE — NUR
PT RESTING IN BED, C/O TENDERNESS TO ABD. SHIFT ASSESSMENT PERFORMED, CALL
LIGHT WITHIN REACH, WILL CONT TO FOLLOW PLAN OF CARE

## 2019-04-18 VITALS — DIASTOLIC BLOOD PRESSURE: 55 MMHG | SYSTOLIC BLOOD PRESSURE: 123 MMHG

## 2019-04-18 VITALS — DIASTOLIC BLOOD PRESSURE: 54 MMHG | SYSTOLIC BLOOD PRESSURE: 109 MMHG

## 2019-04-18 VITALS — SYSTOLIC BLOOD PRESSURE: 108 MMHG | DIASTOLIC BLOOD PRESSURE: 55 MMHG

## 2019-04-18 VITALS — DIASTOLIC BLOOD PRESSURE: 63 MMHG | SYSTOLIC BLOOD PRESSURE: 106 MMHG

## 2019-04-18 VITALS — DIASTOLIC BLOOD PRESSURE: 50 MMHG | SYSTOLIC BLOOD PRESSURE: 92 MMHG

## 2019-04-18 LAB
ALBUMIN SERPL-MCNC: 2.6 G/DL (ref 3.4–5)
ALP SERPL-CCNC: 72 U/L (ref 46–116)
ALT SERPL-CCNC: 24 U/L (ref 10–68)
ANION GAP SERPL CALC-SCNC: 11.8 MMOL/L (ref 8–16)
BASOPHILS NFR BLD AUTO: 0.3 % (ref 0–2)
BILIRUB SERPL-MCNC: 0.5 MG/DL (ref 0.2–1.3)
BUN SERPL-MCNC: 16 MG/DL (ref 7–18)
CALCIUM SERPL-MCNC: 8.1 MG/DL (ref 8.5–10.1)
CHLORIDE SERPL-SCNC: 109 MMOL/L (ref 98–107)
CK MB SERPL-MCNC: 0.9 U/L (ref 0–3.6)
CK SERPL-CCNC: 110 UL (ref 21–232)
CO2 SERPL-SCNC: 24 MMOL/L (ref 21–32)
CREAT SERPL-MCNC: 1.6 MG/DL (ref 0.6–1.3)
EOSINOPHIL NFR BLD: 1.1 % (ref 0–7)
ERYTHROCYTE [DISTWIDTH] IN BLOOD BY AUTOMATED COUNT: 17 % (ref 11.5–14.5)
GLOBULIN SER-MCNC: 3.6 G/L
GLUCOSE SERPL-MCNC: 87 MG/DL (ref 74–106)
HCT VFR BLD CALC: 33.1 % (ref 42–54)
HGB BLD-MCNC: 10.8 G/DL (ref 13.5–17.5)
IMM GRANULOCYTES NFR BLD: 0.1 % (ref 0–5)
LYMPHOCYTES NFR BLD AUTO: 15.4 % (ref 15–50)
MAGNESIUM SERPL-MCNC: 1.9 MG/DL (ref 1.8–2.4)
MCH RBC QN AUTO: 27.3 PG (ref 26–34)
MCHC RBC AUTO-ENTMCNC: 32.6 G/DL (ref 31–37)
MCV RBC: 83.8 FL (ref 80–100)
MONOCYTES NFR BLD: 9.8 % (ref 2–11)
NEUTROPHILS NFR BLD AUTO: 73.3 % (ref 40–80)
OSMOLALITY SERPL CALC.SUM OF ELEC: 280 MOSM/KG (ref 275–300)
PLATELET # BLD: 257 10X3/UL (ref 130–400)
PMV BLD AUTO: 10.1 FL (ref 7.4–10.4)
POTASSIUM SERPL-SCNC: 3.8 MMOL/L (ref 3.5–5.1)
PROT SERPL-MCNC: 6.2 G/DL (ref 6.4–8.2)
RBC # BLD AUTO: 3.95 10X6/UL (ref 4.2–6.1)
SODIUM SERPL-SCNC: 141 MMOL/L (ref 136–145)
TROPONIN I SERPL-MCNC: 0.06 NG/ML (ref 0–0.06)
WBC # BLD AUTO: 7.9 10X3/UL (ref 4.8–10.8)

## 2019-04-18 NOTE — NUR
REPORT RECEIVED FROM NIGHT SHIFT AND PATIENT CARE RESUMED. VSS. PATIENT LAYING
IN BED AWAKE, ALERT AND ORIENTED X 4. VSS. PATIENT DENIES ANY NEEDS OR PAIN.
WILL CONTINUE WITH PLAN OF CARE. SR UP X 2 BED IN LOW POSITION AND CALL LIGHT
IN REACH.

## 2019-04-18 NOTE — NUR
PATIENT LAYING IN BED ON BACK WITH EYES CLOSED AND BREATHING EVENLY. WILL
CONTINUE TO MONITOR. SR UP X 2 BED IN LOW POSTION AND CALL LIGHT IN REACH.

## 2019-04-18 NOTE — NUR
RECIEVED BEDSIDE REPORT. ROUNDS COMPLETED. VSS, AAOX3. NO S/S OF RR DISTRESS.
PT STATES ABDOMINAL PAIN IS A LITTLE BETTER THAN WHAT IT WAS 2HRS AGO. PT
DIBIES ANY FURTHER NEEDS FOR COMFORT CARE. WILL CPOC. CL IN REACH, BED IN LOW,
SR UP X2.

## 2019-04-19 VITALS — SYSTOLIC BLOOD PRESSURE: 105 MMHG | DIASTOLIC BLOOD PRESSURE: 52 MMHG

## 2019-04-19 VITALS — DIASTOLIC BLOOD PRESSURE: 52 MMHG | SYSTOLIC BLOOD PRESSURE: 107 MMHG

## 2019-04-19 VITALS — SYSTOLIC BLOOD PRESSURE: 126 MMHG | DIASTOLIC BLOOD PRESSURE: 68 MMHG

## 2019-04-19 VITALS — SYSTOLIC BLOOD PRESSURE: 165 MMHG | DIASTOLIC BLOOD PRESSURE: 63 MMHG

## 2019-04-19 VITALS — SYSTOLIC BLOOD PRESSURE: 99 MMHG | DIASTOLIC BLOOD PRESSURE: 43 MMHG

## 2019-04-19 LAB
ALBUMIN SERPL-MCNC: 2.3 G/DL (ref 3.4–5)
ALP SERPL-CCNC: 64 U/L (ref 46–116)
ALT SERPL-CCNC: 20 U/L (ref 10–68)
ANION GAP SERPL CALC-SCNC: 10.4 MMOL/L (ref 8–16)
BASOPHILS NFR BLD AUTO: 0.3 % (ref 0–2)
BILIRUB SERPL-MCNC: 0.38 MG/DL (ref 0.2–1.3)
BUN SERPL-MCNC: 9 MG/DL (ref 7–18)
CALCIUM SERPL-MCNC: 8 MG/DL (ref 8.5–10.1)
CHLORIDE SERPL-SCNC: 114 MMOL/L (ref 98–107)
CO2 SERPL-SCNC: 23 MMOL/L (ref 21–32)
CREAT SERPL-MCNC: 1.3 MG/DL (ref 0.6–1.3)
EOSINOPHIL NFR BLD: 2.4 % (ref 0–7)
ERYTHROCYTE [DISTWIDTH] IN BLOOD BY AUTOMATED COUNT: 17.2 % (ref 11.5–14.5)
GLOBULIN SER-MCNC: 3.3 G/L
GLUCOSE SERPL-MCNC: 86 MG/DL (ref 74–106)
HCT VFR BLD CALC: 31.5 % (ref 42–54)
HGB BLD-MCNC: 10.5 G/DL (ref 13.5–17.5)
IMM GRANULOCYTES NFR BLD: 0.2 % (ref 0–5)
LYMPHOCYTES NFR BLD AUTO: 14.2 % (ref 15–50)
MAGNESIUM SERPL-MCNC: 2 MG/DL (ref 1.8–2.4)
MCH RBC QN AUTO: 28 PG (ref 26–34)
MCHC RBC AUTO-ENTMCNC: 33.3 G/DL (ref 31–37)
MCV RBC: 84 FL (ref 80–100)
MONOCYTES NFR BLD: 10.1 % (ref 2–11)
NEUTROPHILS NFR BLD AUTO: 72.8 % (ref 40–80)
OSMOLALITY SERPL CALC.SUM OF ELEC: 284 MOSM/KG (ref 275–300)
PLATELET # BLD: 230 10X3/UL (ref 130–400)
PMV BLD AUTO: 9.5 FL (ref 7.4–10.4)
POTASSIUM SERPL-SCNC: 3.4 MMOL/L (ref 3.5–5.1)
PROT SERPL-MCNC: 5.6 G/DL (ref 6.4–8.2)
RBC # BLD AUTO: 3.75 10X6/UL (ref 4.2–6.1)
SODIUM SERPL-SCNC: 144 MMOL/L (ref 136–145)
WBC # BLD AUTO: 6.6 10X3/UL (ref 4.8–10.8)

## 2019-04-19 NOTE — MORECARE
CASE MANAGEMENT DISCHARGE SUMMARY
 
 
PATIENT: ABRRY TRUONG               UNIT: [id number]
ACCOUNT#: [id number]                       ADM DATE: 19
AGE: 74     : 41  SEX: M            ROOM/BED: D.2101    
AUTHOR: FÁTIMA,DOC                             PHYSICIAN:                               
 
REFERRING PHYSICIAN: DELMIS HOLM MD               
DATE OF SERVICE: 19
Discharge Plan
 
 
Patient Name: BARRY TRUONG
Facility: [facility]:[place]
Encounter #: [id number]
Medical Record #: [id number]
: 1945
Planned Disposition: Home
Anticipated Discharge Date: 
 
Discharge Date: 
Expected LOS: 
Initial Reviewer: [id number]
Initial Review Date: 2019
Generated: 19  12:32 pm 
Comments
 
DCP- Discharge Planning
 
Updated by [id number]: Scott Sun on 19  10:31 am CT
Patient Name: BARRY TRUONG                                     
Admission Status: ER   
Accout number: [id number]                              
Admission Date: 2019   
: 1945                                                        
Admission Diagnosis:   
Attending: DELMIS HOLM                                                
Current LOS:  3   
  
Anticipated DC Date:    
Planned Disposition: Home   
Primary Insurance: [employer] CHOICE PPO MCR ADVANT   
  
  
Discharge Planning Comments:   
CM MET WITH PT IN ROOM TO DISCUSS DISCHARGE PLANNING AND NEEDS. PT REPORTS 
LIVING AT HOME INDEPENDENTLY AND ALONE.  PT HAS CANE AND WALKER WITH NO 
MEDICAL EQUIPMENT PROVIDER PREFERENCE.  PT HAS [facility] 
IN THE HOME. CM DISCUSSED AVAILABILITY OF HOME HEALTH, REHAB SERVICES AND 
 
MEDICAL EQUIPMENT. PT DENIES DISCHARGE NEEDS, REPORTS HIS DAUGHTER WILL PICK 
HIM UP FOR DISCHARGE HOME. CM ENCOURAGED PT TO CONSIDER REHAB SERVICES AND 
PROVIDED PT WITH SKILLED NURSING FACILITY PROVIDER LISTING.  PT WILL DISCUSS 
OPTIONS WITH HIS DAUGHTER AND LET CM KNOW.  
  
PT PLANS TO DISCHARGE HOME ALONE WITH [facility].  FOR 
DISCHARGE, NOTIFY [facility] -[phone]. FAX DISCHARGE INFORMATION 
TO [facility] -[phone].  CM TO FOLLOW AND ASSIST AS NEEDED.  
  
: Scott Sun
 DCPIA - Discharge Planning Initial Assessment
 
Updated by [id number]: Scott Sun on 19  11:26 am
*  Is the patient Alert and Oriented?
Yes
*  How many steps to enter\exit or inside your home? NONE *  PCP DR. NEGRETE *  Pharmacy
[facility]
*  Preadmission Environment
Home Alone
*  ADLs
Independent
*  Equipment
Cane
Rolling Walker
*  Other Equipment
NO MEDICAL EQUIPMENT PROVIDER PREFERENCE
*  List name and contact numbers for known caregivers / representatives who 
currently or will assist patient after discharge:
NAE TRUONG, DTR, [phone]
*  Verbal permission to speak to the caregivers and representatives has been 
obtained from the patient.
Yes
*  Community resources currently utilized
Home Health
*  Please name any agencies selected above.
[facility]
*  Additional services required to return to the preadmission environment?
No
*  Can the patient safely return to the preadmission environment?
Yes
*  Has this patient been hospitalized within the prior 30 days at any 
hospital?
Yes
 
 
 
 
 
 
Patient Name: BARRY TRUONG
 
Encounter #: [id number]
Page [id number]
 
 
 
 
 
Electronically Signed by TANK SHINE on 19 at 1133
 
 
 
 
 
 
**All edits/amendments must be made on the electronic document**
 
DICTATION DATE: 19     : DILMA  192     
RPT#: [id number]                                OR DATE:        
                                               STATUS: ADM IN  
[facility]
 [place] [address]
***END OF REPORT***

## 2019-04-19 NOTE — NUR
Nutrition Follow Up:
Chart reviewed
Diet: Full Liquids AAT Regular
PO Intake: 60% meal avg
BM: 4/19/19
Labs reviewed
Meds noted including Flagyl
Rec continue advancing LIZZIE.
Will honor food preferences.
RD following.

## 2019-04-20 VITALS — DIASTOLIC BLOOD PRESSURE: 67 MMHG | SYSTOLIC BLOOD PRESSURE: 115 MMHG

## 2019-04-20 VITALS — SYSTOLIC BLOOD PRESSURE: 132 MMHG | DIASTOLIC BLOOD PRESSURE: 66 MMHG

## 2019-04-20 VITALS — SYSTOLIC BLOOD PRESSURE: 122 MMHG | DIASTOLIC BLOOD PRESSURE: 67 MMHG

## 2019-04-20 VITALS — SYSTOLIC BLOOD PRESSURE: 107 MMHG | DIASTOLIC BLOOD PRESSURE: 56 MMHG

## 2019-04-20 VITALS — DIASTOLIC BLOOD PRESSURE: 64 MMHG | SYSTOLIC BLOOD PRESSURE: 122 MMHG

## 2019-04-20 VITALS — SYSTOLIC BLOOD PRESSURE: 112 MMHG | DIASTOLIC BLOOD PRESSURE: 60 MMHG

## 2019-04-20 VITALS — DIASTOLIC BLOOD PRESSURE: 56 MMHG | SYSTOLIC BLOOD PRESSURE: 100 MMHG

## 2019-04-20 LAB
ALBUMIN SERPL-MCNC: 2.2 G/DL (ref 3.4–5)
ALP SERPL-CCNC: 64 U/L (ref 46–116)
ALT SERPL-CCNC: 18 U/L (ref 10–68)
ANION GAP SERPL CALC-SCNC: 12.2 MMOL/L (ref 8–16)
BASOPHILS NFR BLD AUTO: 0.1 % (ref 0–2)
BILIRUB SERPL-MCNC: 0.33 MG/DL (ref 0.2–1.3)
BUN SERPL-MCNC: 8 MG/DL (ref 7–18)
CALCIUM SERPL-MCNC: 7.7 MG/DL (ref 8.5–10.1)
CHLORIDE SERPL-SCNC: 114 MMOL/L (ref 98–107)
CO2 SERPL-SCNC: 21 MMOL/L (ref 21–32)
CREAT SERPL-MCNC: 1.3 MG/DL (ref 0.6–1.3)
EOSINOPHIL NFR BLD: 2 % (ref 0–7)
ERYTHROCYTE [DISTWIDTH] IN BLOOD BY AUTOMATED COUNT: 17.6 % (ref 11.5–14.5)
GLOBULIN SER-MCNC: 3.4 G/L
GLUCOSE SERPL-MCNC: 93 MG/DL (ref 74–106)
HCT VFR BLD CALC: 32 % (ref 42–54)
HGB BLD-MCNC: 10.5 G/DL (ref 13.5–17.5)
IMM GRANULOCYTES NFR BLD: 0.3 % (ref 0–5)
LYMPHOCYTES NFR BLD AUTO: 14 % (ref 15–50)
MAGNESIUM SERPL-MCNC: 1.9 MG/DL (ref 1.8–2.4)
MCH RBC QN AUTO: 27.6 PG (ref 26–34)
MCHC RBC AUTO-ENTMCNC: 32.8 G/DL (ref 31–37)
MCV RBC: 84.2 FL (ref 80–100)
MONOCYTES NFR BLD: 10.5 % (ref 2–11)
NEUTROPHILS NFR BLD AUTO: 73.1 % (ref 40–80)
OSMOLALITY SERPL CALC.SUM OF ELEC: 284 MOSM/KG (ref 275–300)
PLATELET # BLD: 237 10X3/UL (ref 130–400)
PMV BLD AUTO: 9.6 FL (ref 7.4–10.4)
POTASSIUM SERPL-SCNC: 3.2 MMOL/L (ref 3.5–5.1)
PROT SERPL-MCNC: 5.6 G/DL (ref 6.4–8.2)
RBC # BLD AUTO: 3.8 10X6/UL (ref 4.2–6.1)
SODIUM SERPL-SCNC: 144 MMOL/L (ref 136–145)
WBC # BLD AUTO: 7 10X3/UL (ref 4.8–10.8)

## 2019-04-21 VITALS — SYSTOLIC BLOOD PRESSURE: 114 MMHG | DIASTOLIC BLOOD PRESSURE: 57 MMHG

## 2019-04-21 VITALS — DIASTOLIC BLOOD PRESSURE: 60 MMHG | SYSTOLIC BLOOD PRESSURE: 111 MMHG

## 2019-04-21 VITALS — SYSTOLIC BLOOD PRESSURE: 134 MMHG | DIASTOLIC BLOOD PRESSURE: 62 MMHG

## 2019-04-21 LAB
ALBUMIN SERPL-MCNC: 2.3 G/DL (ref 3.4–5)
ALP SERPL-CCNC: 70 U/L (ref 46–116)
ALT SERPL-CCNC: 14 U/L (ref 10–68)
ANION GAP SERPL CALC-SCNC: 12.8 MMOL/L (ref 8–16)
BASOPHILS NFR BLD AUTO: 0.2 % (ref 0–2)
BILIRUB SERPL-MCNC: 0.33 MG/DL (ref 0.2–1.3)
BUN SERPL-MCNC: 9 MG/DL (ref 7–18)
CALCIUM SERPL-MCNC: 8.2 MG/DL (ref 8.5–10.1)
CHLORIDE SERPL-SCNC: 113 MMOL/L (ref 98–107)
CO2 SERPL-SCNC: 20.9 MMOL/L (ref 21–32)
CREAT SERPL-MCNC: 1.3 MG/DL (ref 0.6–1.3)
EOSINOPHIL NFR BLD: 2.7 % (ref 0–7)
ERYTHROCYTE [DISTWIDTH] IN BLOOD BY AUTOMATED COUNT: 17.9 % (ref 11.5–14.5)
GLOBULIN SER-MCNC: 3.7 G/L
GLUCOSE SERPL-MCNC: 97 MG/DL (ref 74–106)
HCT VFR BLD CALC: 34.4 % (ref 42–54)
HGB BLD-MCNC: 11.2 G/DL (ref 13.5–17.5)
IMM GRANULOCYTES NFR BLD: 0.2 % (ref 0–5)
LYMPHOCYTES NFR BLD AUTO: 15.7 % (ref 15–50)
MAGNESIUM SERPL-MCNC: 2 MG/DL (ref 1.8–2.4)
MCH RBC QN AUTO: 27.4 PG (ref 26–34)
MCHC RBC AUTO-ENTMCNC: 32.6 G/DL (ref 31–37)
MCV RBC: 84.1 FL (ref 80–100)
MONOCYTES NFR BLD: 8.8 % (ref 2–11)
NEUTROPHILS NFR BLD AUTO: 72.4 % (ref 40–80)
OSMOLALITY SERPL CALC.SUM OF ELEC: 283 MOSM/KG (ref 275–300)
PLATELET # BLD: 248 10X3/UL (ref 130–400)
PMV BLD AUTO: 9.8 FL (ref 7.4–10.4)
POTASSIUM SERPL-SCNC: 3.7 MMOL/L (ref 3.5–5.1)
PROT SERPL-MCNC: 6 G/DL (ref 6.4–8.2)
RBC # BLD AUTO: 4.09 10X6/UL (ref 4.2–6.1)
SODIUM SERPL-SCNC: 143 MMOL/L (ref 136–145)
WBC # BLD AUTO: 8.2 10X3/UL (ref 4.8–10.8)

## 2019-04-21 NOTE — NUR
ATTMEPTED TO CALL PTS FAMILY SEVERAL TIME, THEY REFUSEDTO ANSWER, ONCE PICKED
UP THEN HUNG UP WHILE I WAS TALKING. PT WILL TAKE CAB HOME. PT IS AWARE AND
ABLE.

## 2019-04-21 NOTE — MORECARE
CASE MANAGEMENT DISCHARGE SUMMARY
 
 
PATIENT: BARRY TRUONG               UNIT: [id number]
ACCOUNT#: [id number]                       ADM DATE: 19
AGE: 74     : 41  SEX: M            ROOM/BED: D.2102    
AUTHOR: FÁTIMA,DOC                             PHYSICIAN:                               
 
REFERRING PHYSICIAN: DELMIS HOLM MD               
DATE OF SERVICE: 19
Discharge Plan
 
 
Patient Name: BARRY TRUONG
Facility: [facility]:[place]
Encounter #: [id number]
Medical Record #: [id number]
: 1945
Planned Disposition: Home
Anticipated Discharge Date: 
 
Discharge Date: 
Expected LOS: 
Initial Reviewer: [id number]
Initial Review Date: 2019
Generated: 19   2:15 pm 
Comments
 
DCP- Discharge Planning
 
Updated by [id number]: Nazanin Oliveira on 19  12:11 pm CT
CM SPOKE WITH PATIENT REGARDING D/C. DAUGHTER WOULD NOT  PATIENT AND 
CM CALLED A CAB FOR HIM TO GET HOME HER IN [place]. VOUCHER WAS FOR 7.25. [facility] 
AND [facility] FOR[facility] SIGNED.
DCP- Discharge Planning
 
Updated by [id number]: Scott Sun on 19  10:31 am CT
Patient Name: BARRY TRUONG                                     
Admission Status: ER   
Accout number: [id number]                              
Admission Date: 2019   
: 1945                                                        
Admission Diagnosis:   
Attending: DELMIS HOLM                                                
Current LOS:  3   
  
Anticipated DC Date:    
Planned Disposition: Home   
Primary Insurance: [employer] CHOICE PPO MCR ADVANT   
  
 
  
Discharge Planning Comments:   
CM MET WITH PT IN ROOM TO DISCUSS DISCHARGE PLANNING AND NEEDS. PT REPORTS 
LIVING AT HOME INDEPENDENTLY AND ALONE.  PT HAS CANE AND WALKER WITH NO 
MEDICAL EQUIPMENT PROVIDER PREFERENCE.  PT HAS [facility] 
IN THE HOME. CM DISCUSSED AVAILABILITY OF HOME HEALTH, REHAB SERVICES AND 
MEDICAL EQUIPMENT. PT DENIES DISCHARGE NEEDS, REPORTS HIS DAUGHTER WILL PICK 
HIM UP FOR DISCHARGE HOME. CM ENCOURAGED PT TO CONSIDER REHAB SERVICES AND 
PROVIDED PT WITH SKILLED NURSING FACILITY PROVIDER LISTING.  PT WILL DISCUSS 
OPTIONS WITH HIS DAUGHTER AND LET CM KNOW.  
  
PT PLANS TO DISCHARGE HOME ALONE WITH [facility].  FOR 
DISCHARGE, NOTIFY [facility] -[phone]. FAX DISCHARGE INFORMATION 
TO [facility] -[phone].  CM TO FOLLOW AND ASSIST AS NEEDED.  
  
: Scott Sun
 DCPIA - Discharge Planning Initial Assessment
 
Updated by [id number]: Scott Sun on 19  11:26 am
*  Is the patient Alert and Oriented?
Yes
*  How many steps to enter\exit or inside your home? NONE *  PCP DR. NEGRETE *  Pharmacy
[facility]
*  Preadmission Environment
Home Alone
*  ADLs
Independent
*  Equipment
Cane
Rolling Walker
*  Other Equipment
NO MEDICAL EQUIPMENT PROVIDER PREFERENCE
*  List name and contact numbers for known caregivers / representatives who 
currently or will assist patient after discharge:
NAE TRUONG, DTR, [phone]
*  Verbal permission to speak to the caregivers and representatives has been 
obtained from the patient.
Yes
*  Community resources currently utilized
Home Health
*  Please name any agencies selected above.
[facility]
*  Additional services required to return to the preadmission environment?
No
*  Can the patient safely return to the preadmission environment?
Yes
*  Has this patient been hospitalized within the prior 30 days at any 
hospital?
Yes
 
 
 
 
 
 
Coverage Notice
 
Reviewer: [email or web address] Jenny Oliveira
 
Notice Issued Date-Time: 2019  13:08
Notice Type: Patient Choice Letter
 
Notice Delivered To: Patient
Relationship to Patient: 
Representative Name: 
 
Delivery Method: HAND - Hand Delivered
Bell Days:
Prior Verbal Notification: 
 
Recipient Understood Notice: Yes
Recipient Signature: Yes
Med Rec Note Co-signed by Attending:
 
Coverage Notice Comment:  [facility] SIGNED
Reviewer: [email or web address] Jenny Oliveira
 
Notice Issued Date-Time: 2019  12:45
Notice Type: IM Discharge Notice
 
Notice Delivered To: Patient
Relationship to Patient: 
Representative Name: 
 
Delivery Method: HAND - Hand Delivered
Bell Days:
Prior Verbal Notification: 
 
Recipient Understood Notice: Yes
Recipient Signature: Yes
Med Rec Note Co-signed by Attending:
 
Coverage Notice Comment:  
 
Last DP export: 19  10:32 a
Patient Name: BARRY TRUONG
 
Encounter #: [id number]
Page [id number]
 
 
 
 
 
Electronically Signed by TANK SHINE on 19 at 1316
 
 
 
 
 
 
**All edits/amendments must be made on the electronic document**
 
DICTATION DATE: 19 1315     : DILMA  19 1315     
RPT#: [id number]                                DC DATE:        
                                               STATUS: ADM IN  
[facility]
[address] [place] [address]
***END OF REPORT***

## 2019-04-21 NOTE — NUR
PT AWAKE AND ORIENTED, NO COMPLAINTS. RECIEVED BATH LAST NIGHT AND CLEAN
SHAVE. STATES HE FEELS A LOT BETTER. NO COMPLAINTS/CONCERNS VOICED AT THIS
TIME. CL IN REACH, SRX2.

## 2019-04-22 ENCOUNTER — HOSPITAL ENCOUNTER (OUTPATIENT)
Dept: HOSPITAL 84 - D.ER | Age: 74
Setting detail: OBSERVATION
LOS: 2 days | Discharge: HOME | End: 2019-04-24
Attending: INTERNAL MEDICINE | Admitting: INTERNAL MEDICINE
Payer: MEDICARE

## 2019-04-22 VITALS
BODY MASS INDEX: 25.18 KG/M2 | WEIGHT: 147.51 LBS | HEIGHT: 64 IN | BODY MASS INDEX: 25.18 KG/M2 | WEIGHT: 147.51 LBS | HEIGHT: 64 IN | BODY MASS INDEX: 25.18 KG/M2

## 2019-04-22 VITALS — SYSTOLIC BLOOD PRESSURE: 92 MMHG | DIASTOLIC BLOOD PRESSURE: 51 MMHG

## 2019-04-22 VITALS — SYSTOLIC BLOOD PRESSURE: 117 MMHG | DIASTOLIC BLOOD PRESSURE: 65 MMHG

## 2019-04-22 DIAGNOSIS — I42.9: ICD-10-CM

## 2019-04-22 DIAGNOSIS — E87.0: ICD-10-CM

## 2019-04-22 DIAGNOSIS — E87.6: ICD-10-CM

## 2019-04-22 DIAGNOSIS — D50.9: ICD-10-CM

## 2019-04-22 DIAGNOSIS — I50.23: ICD-10-CM

## 2019-04-22 DIAGNOSIS — I11.0: ICD-10-CM

## 2019-04-22 DIAGNOSIS — R55: Primary | ICD-10-CM

## 2019-04-22 LAB
ALBUMIN SERPL-MCNC: 2.6 G/DL (ref 3.4–5)
ALP SERPL-CCNC: 68 U/L (ref 46–116)
ALT SERPL-CCNC: 14 U/L (ref 10–68)
ANION GAP SERPL CALC-SCNC: 11 MMOL/L (ref 8–16)
APTT BLD: 34.3 SECONDS (ref 22.8–39.4)
BASOPHILS NFR BLD AUTO: 0.3 % (ref 0–2)
BILIRUB SERPL-MCNC: 0.47 MG/DL (ref 0.2–1.3)
BUN SERPL-MCNC: 11 MG/DL (ref 7–18)
CALCIUM SERPL-MCNC: 8.1 MG/DL (ref 8.5–10.1)
CHLORIDE SERPL-SCNC: 109 MMOL/L (ref 98–107)
CK MB SERPL-MCNC: 1.1 U/L (ref 0–3.6)
CK SERPL-CCNC: 58 UL (ref 21–232)
CO2 SERPL-SCNC: 23.6 MMOL/L (ref 21–32)
CREAT SERPL-MCNC: 1.5 MG/DL (ref 0.6–1.3)
EOSINOPHIL NFR BLD: 2.5 % (ref 0–7)
ERYTHROCYTE [DISTWIDTH] IN BLOOD BY AUTOMATED COUNT: 18.1 % (ref 11.5–14.5)
GLOBULIN SER-MCNC: 3.8 G/L
GLUCOSE SERPL-MCNC: 100 MG/DL (ref 74–106)
HCT VFR BLD CALC: 34.2 % (ref 42–54)
HGB BLD-MCNC: 11.4 G/DL (ref 13.5–17.5)
IMM GRANULOCYTES NFR BLD: 0.1 % (ref 0–5)
INR PPP: 1.15 (ref 0.85–1.17)
LYMPHOCYTES NFR BLD AUTO: 14.2 % (ref 15–50)
MAGNESIUM SERPL-MCNC: 1.8 MG/DL (ref 1.8–2.4)
MCH RBC QN AUTO: 27.8 PG (ref 26–34)
MCHC RBC AUTO-ENTMCNC: 33.3 G/DL (ref 31–37)
MCV RBC: 83.4 FL (ref 80–100)
MONOCYTES NFR BLD: 9.7 % (ref 2–11)
NEUTROPHILS NFR BLD AUTO: 73.2 % (ref 40–80)
OSMOLALITY SERPL CALC.SUM OF ELEC: 277 MOSM/KG (ref 275–300)
PLATELET # BLD: 258 10X3/UL (ref 130–400)
PMV BLD AUTO: 9.6 FL (ref 7.4–10.4)
POTASSIUM SERPL-SCNC: 3.6 MMOL/L (ref 3.5–5.1)
PROT SERPL-MCNC: 6.4 G/DL (ref 6.4–8.2)
PROTHROMBIN TIME: 14.2 SECONDS (ref 11.6–15)
RBC # BLD AUTO: 4.1 10X6/UL (ref 4.2–6.1)
SODIUM SERPL-SCNC: 140 MMOL/L (ref 136–145)
TROPONIN I SERPL-MCNC: 0.06 NG/ML (ref 0–0.06)
WBC # BLD AUTO: 7.2 10X3/UL (ref 4.8–10.8)

## 2019-04-22 NOTE — MORECARE
CASE MANAGEMENT DISCHARGE SUMMARY
 
 
PATIENT: BARRY TRUONG               UNIT: [id number]
ACCOUNT#: [id number]                       ADM DATE: 19
AGE: 74     : [birth year]  SEX: M            ROOM/BED: D.2102    
AUTHOR: FÁTIMA,DOC                             PHYSICIAN:                               
 
REFERRING PHYSICIAN: DELMIS HOLM MD               
DATE OF SERVICE: 19
Discharge Plan
 
 
Patient Name: BARRY TRUONG
Facility: [facility]
Encounter #: [id number]
Medical Record #: [id number]
: 1945
Planned Disposition: Home
Anticipated Discharge Date: 
 
Discharge Date: 2019
Expected LOS: 
Initial Reviewer: [id number]
Initial Review Date: 2019
Generated: 19   9:47 am 
Comments
 
DCP- Discharge Planning
 
Updated by [id number]: Nazanin Oliveira on 19  12:11 pm CT
CM SPOKE WITH PATIENT REGARDING D/C. DAUGHTER WOULD NOT  PATIENT AND 
CM CALLED A CAB FOR HIM TO GET HOME HER IN [place]OWN. VOUCHER WAS FOR 7.25. [facility] 
AND [facility] FOR[facility] SIGNED.
DCP- Discharge Planning
 
Updated by [id number]: Scott Sun on 19  10:31 am CT
Patient Name: BARRY TRUONG                                     
Admission Status: ER   
Accout number: [id number]                              
Admission Date: 2019   
: 1945                                                        
Admission Diagnosis:   
Attending: DELMIS HOLM                                                
Current LOS:  3   
  
Anticipated DC Date:    
Planned Disposition: Home   
Primary Insurance: [employer] CHOICE PPO MCR ADVANT   
  
 
  
Discharge Planning Comments:   
CM MET WITH PT IN ROOM TO DISCUSS DISCHARGE PLANNING AND NEEDS. PT REPORTS 
LIVING AT HOME INDEPENDENTLY AND ALONE.  PT HAS CANE AND WALKER WITH NO 
MEDICAL EQUIPMENT PROVIDER PREFERENCE.  PT HAS [facility] 
IN THE HOME. CM DISCUSSED AVAILABILITY OF HOME HEALTH, REHAB SERVICES AND 
MEDICAL EQUIPMENT. PT DENIES DISCHARGE NEEDS, REPORTS HIS DAUGHTER WILL PICK 
HIM UP FOR DISCHARGE HOME. CM ENCOURAGED PT TO CONSIDER REHAB SERVICES AND 
PROVIDED PT WITH SKILLED NURSING FACILITY PROVIDER LISTING.  PT WILL DISCUSS 
OPTIONS WITH HIS DAUGHTER AND LET CM KNOW.  
  
PT [facility]  FOR 
DISCHARGE, NOTIFY [facility] -[phone]. FAX DISCHARGE INFORMATION 
TO [facility] -[phone].  CM TO FOLLOW AND ASSIST AS NEEDED.  
  
: Scott Sun
 DCPIA - Discharge Planning Initial Assessment
 
Updated by [email or web address]: Scott Sun on 19  11:26 am
*  Is the patient Alert and Oriented?
Yes
*  How many steps to enter\exit or inside your home? NONE *  PCP DR. NEGRETE *  Pharmacy
[facility]
*  Preadmission Environment
Home Alone
*  ADLs
Independent
*  Equipment
Cane
Rolling Walker
*  Other Equipment
NO MEDICAL EQUIPMENT PROVIDER PREFERENCE
*  List name and contact numbers for known caregivers / representatives who 
currently or will assist patient after discharge:
NAE TRUONG, DTR, [phone]
*  Verbal permission to speak to the caregivers and representatives has been 
obtained from the patient.
Yes
*  Community resources currently utilized
Home Health
*  Please name any agencies selected above.
[facility]
*  Additional services required to return to the preadmission environment?
No
*  Can the patient safely return to the preadmission environment?
Yes
*  Has this patient been hospitalized within the prior 30 days at any 
hospital?
Yes
 
 
 
 
 
 
Coverage Notice
 
Reviewer: [id number] Jenny Oliveira
 
Notice Issued Date-Time: 2019  13:08
Notice Type: Patient Choice Letter
 
Notice Delivered To: Patient
Relationship to Patient: 
Representative Name: 
 
Delivery Method: HAND - Hand Delivered
Bell Days:
Prior Verbal Notification: 
 
Recipient Understood Notice: Yes
Recipient Signature: Yes
Med Rec Note Co-signed by Attending:
 
Coverage Notice Comment:  [facility] SIGNED
Reviewer: [id number] Jenny Oliveira
 
Notice Issued Date-Time: 2019  12:45
Notice Type: IM Discharge Notice
 
Notice Delivered To: Patient
Relationship to Patient: 
Representative Name: 
 
Delivery Method: HAND - Hand Delivered
Bell Days:
Prior Verbal Notification: 
 
Recipient Understood Notice: Yes
Recipient Signature: Yes
Med Rec Note Co-signed by Attending:
 
Coverage Notice Comment:  
 
Last DP export: 19  12:16 p
Patient Name: BARRY TRUONG
 
Encounter #: [id number]
Page [id number]
 
 
 
 
 
Electronically Signed by [facility]ANK [facility] on 19 at 0847
 
 
 
 
 
 
**All edits/amendments must be made on the electronic document**
 
DICTATION DATE: 19     : DILMA  1948     
RPT#: [id number]                                MO DATE:19
                                               STATUS: DIS IN  
[facility]
 [place] [address]
***END OF REPORT***

## 2019-04-23 VITALS — SYSTOLIC BLOOD PRESSURE: 125 MMHG | DIASTOLIC BLOOD PRESSURE: 71 MMHG

## 2019-04-23 VITALS — DIASTOLIC BLOOD PRESSURE: 44 MMHG | SYSTOLIC BLOOD PRESSURE: 116 MMHG

## 2019-04-23 VITALS — DIASTOLIC BLOOD PRESSURE: 57 MMHG | SYSTOLIC BLOOD PRESSURE: 118 MMHG

## 2019-04-23 VITALS — DIASTOLIC BLOOD PRESSURE: 54 MMHG | SYSTOLIC BLOOD PRESSURE: 97 MMHG

## 2019-04-23 VITALS — DIASTOLIC BLOOD PRESSURE: 55 MMHG | SYSTOLIC BLOOD PRESSURE: 114 MMHG

## 2019-04-23 VITALS — DIASTOLIC BLOOD PRESSURE: 51 MMHG | SYSTOLIC BLOOD PRESSURE: 92 MMHG

## 2019-04-23 VITALS — SYSTOLIC BLOOD PRESSURE: 127 MMHG | DIASTOLIC BLOOD PRESSURE: 77 MMHG

## 2019-04-23 LAB
ANION GAP SERPL CALC-SCNC: 12.1 MMOL/L (ref 8–16)
BASOPHILS NFR BLD AUTO: 0.1 % (ref 0–2)
BUN SERPL-MCNC: 7 MG/DL (ref 7–18)
CALCIUM SERPL-MCNC: 6.1 MG/DL (ref 8.5–10.1)
CHLORIDE SERPL-SCNC: 117 MMOL/L (ref 98–107)
CK MB SERPL-MCNC: 0.8 U/L (ref 0–3.6)
CK SERPL-CCNC: 38 UL (ref 21–232)
CO2 SERPL-SCNC: 22.4 MMOL/L (ref 21–32)
CREAT SERPL-MCNC: 1 MG/DL (ref 0.6–1.3)
EOSINOPHIL NFR BLD: 3.4 % (ref 0–7)
ERYTHROCYTE [DISTWIDTH] IN BLOOD BY AUTOMATED COUNT: 18.2 % (ref 11.5–14.5)
GLUCOSE SERPL-MCNC: 69 MG/DL (ref 74–106)
HCT VFR BLD CALC: 27.7 % (ref 42–54)
HGB BLD-MCNC: 9.1 G/DL (ref 13.5–17.5)
IMM GRANULOCYTES NFR BLD: 0.3 % (ref 0–5)
IRON SERPL-MCNC: 20 UG/DL (ref 35–150)
LYMPHOCYTES NFR BLD AUTO: 12.4 % (ref 15–50)
MAGNESIUM SERPL-MCNC: 1.3 MG/DL (ref 1.8–2.4)
MCH RBC QN AUTO: 27.3 PG (ref 26–34)
MCHC RBC AUTO-ENTMCNC: 32.9 G/DL (ref 31–37)
MCV RBC: 83.2 FL (ref 80–100)
MONOCYTES NFR BLD: 10.8 % (ref 2–11)
NEUTROPHILS NFR BLD AUTO: 73 % (ref 40–80)
OSMOLALITY SERPL CALC.SUM OF ELEC: 291 MOSM/KG (ref 275–300)
PLATELET # BLD: 226 10X3/UL (ref 130–400)
PMV BLD AUTO: 9.7 FL (ref 7.4–10.4)
POTASSIUM SERPL-SCNC: 2.5 MMOL/L (ref 3.5–5.1)
RBC # BLD AUTO: 3.33 10X6/UL (ref 4.2–6.1)
SAO2 % BLD FROM PO2: 14 % (ref 15–55)
SODIUM SERPL-SCNC: 149 MMOL/L (ref 136–145)
TIBC SERPL-MCNC: 137 UG/DL (ref 260–445)
TROPONIN I SERPL-MCNC: 0.05 NG/ML (ref 0–0.06)
UIBC SERPL-MCNC: 117 UG/DL (ref 150–375)
WBC # BLD AUTO: 7 10X3/UL (ref 4.8–10.8)

## 2019-04-24 VITALS — DIASTOLIC BLOOD PRESSURE: 55 MMHG | SYSTOLIC BLOOD PRESSURE: 109 MMHG

## 2019-04-24 VITALS — SYSTOLIC BLOOD PRESSURE: 118 MMHG | DIASTOLIC BLOOD PRESSURE: 58 MMHG

## 2019-04-24 VITALS — DIASTOLIC BLOOD PRESSURE: 42 MMHG | SYSTOLIC BLOOD PRESSURE: 90 MMHG

## 2019-04-24 VITALS — SYSTOLIC BLOOD PRESSURE: 113 MMHG | DIASTOLIC BLOOD PRESSURE: 63 MMHG

## 2019-04-24 VITALS — SYSTOLIC BLOOD PRESSURE: 108 MMHG | DIASTOLIC BLOOD PRESSURE: 56 MMHG

## 2019-04-24 LAB
ANION GAP SERPL CALC-SCNC: 10.9 MMOL/L (ref 8–16)
BASOPHILS NFR BLD AUTO: 0.1 % (ref 0–2)
BUN SERPL-MCNC: 9 MG/DL (ref 7–18)
CALCIUM SERPL-MCNC: 7.9 MG/DL (ref 8.5–10.1)
CHLORIDE SERPL-SCNC: 109 MMOL/L (ref 98–107)
CO2 SERPL-SCNC: 24.6 MMOL/L (ref 21–32)
CREAT SERPL-MCNC: 1.3 MG/DL (ref 0.6–1.3)
EOSINOPHIL NFR BLD: 3.5 % (ref 0–7)
ERYTHROCYTE [DISTWIDTH] IN BLOOD BY AUTOMATED COUNT: 18.7 % (ref 11.5–14.5)
FOLATE SERPL-MCNC: 4.5 NG/ML (ref 3–?)
GLUCOSE SERPL-MCNC: 92 MG/DL (ref 74–106)
HCT VFR BLD CALC: 32.2 % (ref 42–54)
HGB BLD-MCNC: 10.8 G/DL (ref 13.5–17.5)
IMM GRANULOCYTES NFR BLD: 0.3 % (ref 0–5)
LYMPHOCYTES NFR BLD AUTO: 13.6 % (ref 15–50)
MCH RBC QN AUTO: 28.1 PG (ref 26–34)
MCHC RBC AUTO-ENTMCNC: 33.5 G/DL (ref 31–37)
MCV RBC: 83.6 FL (ref 80–100)
MONOCYTES NFR BLD: 12.6 % (ref 2–11)
NEUTROPHILS NFR BLD AUTO: 69.9 % (ref 40–80)
OSMOLALITY SERPL CALC.SUM OF ELEC: 279 MOSM/KG (ref 275–300)
PLATELET # BLD: 250 10X3/UL (ref 130–400)
PMV BLD AUTO: 9.5 FL (ref 7.4–10.4)
POTASSIUM SERPL-SCNC: 3.5 MMOL/L (ref 3.5–5.1)
RBC # BLD AUTO: 3.85 10X6/UL (ref 4.2–6.1)
SODIUM SERPL-SCNC: 141 MMOL/L (ref 136–145)
VIT B12 SERPL-MCNC: 161 PG/ML (ref 232–1245)
WBC # BLD AUTO: 7.1 10X3/UL (ref 4.8–10.8)

## 2019-04-25 NOTE — MORECARE
CASE MANAGEMENT DISCHARGE SUMMARY
 
 
PATIENT: BARRY TRUONG               UNIT: [id number]
ACCOUNT#: [id number]                       ADM DATE: 19
AGE: 74     : [birth year]  SEX: M            ROOM/BED: D.2122    
AUTHOR: TANK SHINE                             PHYSICIAN:                               
 
REFERRING PHYSICIAN: DELMIS HOLM MD               
DATE OF SERVICE: 19
Discharge Plan
 
 
Patient Name: BARRY TRUONG
Facility: [facility]
Encounter #: [id number]
Medical Record #: [id number]
: 1945
Planned Disposition: Home
Anticipated Discharge Date: 19
 
Discharge Date: 2019
Expected LOS: 2
Initial Reviewer: [id number]
Initial Review Date: 2019
Generated: 19   9:29 am 
  
 
 
 
 
 
 
Coverage Notice
 
Reviewer: [id number] Jenny Weathers
 
Notice Issued Date-Time: 2019  17:01
Notice Type: Medicare Outpatient Observation Notice
 
Notice Delivered To: Patient
Relationship to Patient: 
Representative Name: 
 
Delivery Method: HAND - Hand Delivered
Bell Days:
Prior Verbal Notification: 
 
Recipient Understood Notice: Yes
Recipient Signature: Yes
 
Med Rec Note Co-signed by Attending:
 
Coverage Notice Comment:  
Patient Name: BARRY TRUONG
Encounter #: [id number]
Page [id number]
 
 
 
 
 
Electronically Signed by TANK SHINE on 19 at 0829
 
 
 
 
 
 
**All edits/amendments must be made on the electronic document**
 
DICTATION DATE: 19     : DILMA  19     
RPT#: [id number]                                DC DATE:19
                                               STATUS: DIS IN  
[address] [place] [address]
***END OF REPORT***

## 2019-06-07 ENCOUNTER — HOSPITAL ENCOUNTER (OUTPATIENT)
Dept: HOSPITAL 84 - D.CT | Age: 74
Discharge: HOME | End: 2019-06-07
Attending: FAMILY MEDICINE
Payer: MEDICARE

## 2019-06-07 VITALS — BODY MASS INDEX: 24.6 KG/M2

## 2019-06-07 DIAGNOSIS — K57.92: Primary | ICD-10-CM

## 2019-08-05 ENCOUNTER — HOSPITAL ENCOUNTER (OUTPATIENT)
Dept: HOSPITAL 84 - D.CT | Age: 74
Discharge: HOME | End: 2019-08-05
Attending: THORACIC SURGERY (CARDIOTHORACIC VASCULAR SURGERY)
Payer: MEDICARE

## 2019-08-05 VITALS — BODY MASS INDEX: 24.6 KG/M2

## 2019-08-05 DIAGNOSIS — I71.2: Primary | ICD-10-CM

## 2019-08-05 LAB
ANION GAP SERPL CALC-SCNC: 12.4 MMOL/L (ref 8–16)
BUN SERPL-MCNC: 25 MG/DL (ref 7–18)
CALCIUM SERPL-MCNC: 8.4 MG/DL (ref 8.5–10.1)
CHLORIDE SERPL-SCNC: 104 MMOL/L (ref 98–107)
CO2 SERPL-SCNC: 25.4 MMOL/L (ref 21–32)
CREAT SERPL-MCNC: 1.8 MG/DL (ref 0.6–1.3)
ERYTHROCYTE [DISTWIDTH] IN BLOOD BY AUTOMATED COUNT: 17 % (ref 11.5–14.5)
GLUCOSE SERPL-MCNC: 98 MG/DL (ref 74–106)
HCT VFR BLD CALC: 36.6 % (ref 42–54)
HGB BLD-MCNC: 12 G/DL (ref 13.5–17.5)
MCH RBC QN AUTO: 27.8 PG (ref 26–34)
MCHC RBC AUTO-ENTMCNC: 32.8 G/DL (ref 31–37)
MCV RBC: 84.7 FL (ref 80–100)
OSMOLALITY SERPL CALC.SUM OF ELEC: 279 MOSM/KG (ref 275–300)
PLATELET # BLD: 278 10X3/UL (ref 130–400)
PMV BLD AUTO: 9.7 FL (ref 7.4–10.4)
POTASSIUM SERPL-SCNC: 3.8 MMOL/L (ref 3.5–5.1)
RBC # BLD AUTO: 4.32 10X6/UL (ref 4.2–6.1)
SODIUM SERPL-SCNC: 138 MMOL/L (ref 136–145)
WBC # BLD AUTO: 8.4 10X3/UL (ref 4.8–10.8)

## 2019-08-08 ENCOUNTER — HOSPITAL ENCOUNTER (OUTPATIENT)
Dept: HOSPITAL 84 - D.OPS | Age: 74
Discharge: HOME | End: 2019-08-08
Attending: UROLOGY
Payer: MEDICARE

## 2019-08-08 VITALS — DIASTOLIC BLOOD PRESSURE: 53 MMHG | SYSTOLIC BLOOD PRESSURE: 121 MMHG

## 2019-08-08 VITALS — BODY MASS INDEX: 23.9 KG/M2 | WEIGHT: 140 LBS | HEIGHT: 64 IN | BODY MASS INDEX: 23.9 KG/M2

## 2019-08-08 DIAGNOSIS — N13.8: ICD-10-CM

## 2019-08-08 DIAGNOSIS — N40.1: Primary | ICD-10-CM

## 2019-08-08 NOTE — NUR
1013
IV REMOVED WITH CATHALON INTACT.  ALL DC CRITERIA MET.  TAKEN DOWN
VIA WC WITH DAUGHTER.  ADVISED TO CALL OR COME BACK IF ANY PROBLEMS.

## 2019-08-08 NOTE — OP
PATIENT NAME:  BARRY TRUONG                     MEDICAL RECORD: [id number]
:[birth year]                                             LOCATION:D.OPS          
                                                         ADMISSION DATE:        
SURGEON:  ÁNGEL WALLACE MD              
 
 
DATE OF OPERATION:  2019
 
SURGEON:  Ángel Wallace MD
 
ANESTHESIA:  TIVA by Melly Higgins CRNA
 
DIAGNOSIS:  Obstructive benign prostatic hyperplasia.
 
IPSS is 17, quality of life score is 5.  A 20 gram prostate on digital rectal
examination.
 
PROCEDURE:  UroLift times 4 in box configuration around the bladder neck.
 
FINDINGS:  Primarily bladder neck obstruction.  Single ureteral orifices
bilaterally.  No bladder tumors.  Trabeculated bladder.
 
BLOOD LOSS:  None.
 
CLINICAL HISTORY:  This is a 74-year-old male, who has obstructive voiding
symptoms.  He has nocturia times 2-5 with urgency and urge incontinence.  There
is hesitancy in getting started and a slow urinary flow.  He has dribbling after
voiding.  He is not allergic to any medications.  He comes today to have the
UroLift procedure done.  He was given Ancef on call to the OR.
 
DESCRIPTION OF PROCEDURE:  The patient was given IV sedation.  He was then
placed into dorsal lithotomy position and prepped and draped.  Cystoscopy
findings are as outlined above.  Going into the urethra with the UroLift
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
dilate using the scope.  We then implanted two anterolateral units, one on each
side.  They were placed in the anterolateral sulcus of the lateral lobe, 1.5 cm
distal to the bladder neck.  This opened up the anterior portion of the urethra.
 It was quite evident that the lateral lobes are not obstructive.  Therefore, at
the mid prostate level, 1.5 cm distal to the bladder neck, two more units were
placed and this gave a nice open bladder neck.  The bladder was left partly full
to allow voiding trial today.  I will see the patient in followup in 1 months'
time.
 
TRANSINT:[id number] Voice Confirmation ID: [id number] DOCUMENT ID: [id number]
                                           
                                           ÁNGEL WALLACE MD              
 
 
 
Electronically Signed by ÁNGEL WALLACE on 19 at 1115
CC:                                                             [phone]
DICTATION DATE: 19 0849     :     19 1106      [facility]                                          
[address] [place] [address]

## 2019-08-09 ENCOUNTER — HOSPITAL ENCOUNTER (EMERGENCY)
Dept: HOSPITAL 84 - D.ER | Age: 74
Discharge: HOME | End: 2019-08-09
Payer: MEDICARE

## 2019-08-09 VITALS — WEIGHT: 135.28 LBS | BODY MASS INDEX: 23.09 KG/M2 | HEIGHT: 64 IN

## 2019-08-09 VITALS — DIASTOLIC BLOOD PRESSURE: 77 MMHG | SYSTOLIC BLOOD PRESSURE: 132 MMHG

## 2019-08-09 DIAGNOSIS — N99.89: Primary | ICD-10-CM

## 2019-08-09 DIAGNOSIS — R31.9: ICD-10-CM

## 2019-08-09 LAB
APPEARANCE UR: (no result)
BACTERIA #/AREA URNS HPF: (no result) /HPF
BILIRUB SERPL-MCNC: NEGATIVE MG/DL
COLOR UR: YELLOW
GLUCOSE SERPL-MCNC: NEGATIVE MG/DL
HYALINE CASTS #/AREA URNS LPF: (no result) /LPF
KETONES UR STRIP-MCNC: NEGATIVE MG/DL
MUCOUS THREADS #/AREA URNS LPF: (no result) /LPF
NITRITE UR-MCNC: NEGATIVE MG/ML
PH UR STRIP: 5 [PH] (ref 5–6)
PROT UR-MCNC: (no result) MG/DL
RBC #/AREA URNS HPF: >50 /HPF (ref 0–5)
SP GR UR STRIP: 1 (ref 1–1.02)
SQUAMOUS #/AREA URNS HPF: (no result) /HPF (ref 0–5)
UROBILINOGEN UR-MCNC: NORMAL MG/DL
WBC #/AREA URNS HPF: (no result) /HPF (ref 0–5)

## 2019-08-22 ENCOUNTER — HOSPITAL ENCOUNTER (EMERGENCY)
Dept: HOSPITAL 84 - D.ER | Age: 74
Discharge: HOME | End: 2019-08-22
Payer: MEDICARE

## 2019-08-22 VITALS — DIASTOLIC BLOOD PRESSURE: 58 MMHG | SYSTOLIC BLOOD PRESSURE: 106 MMHG

## 2019-08-22 VITALS — HEIGHT: 64 IN | WEIGHT: 135.28 LBS | BODY MASS INDEX: 23.09 KG/M2

## 2019-08-22 DIAGNOSIS — R51: Primary | ICD-10-CM

## 2019-10-28 ENCOUNTER — HOSPITAL ENCOUNTER (INPATIENT)
Dept: HOSPITAL 84 - D.ER | Age: 74
LOS: 28 days | DRG: 377 | End: 2019-11-25
Attending: FAMILY MEDICINE | Admitting: FAMILY MEDICINE
Payer: MEDICARE

## 2019-10-28 VITALS
WEIGHT: 143.3 LBS | HEIGHT: 64 IN | BODY MASS INDEX: 24.46 KG/M2 | WEIGHT: 143.3 LBS | BODY MASS INDEX: 24.46 KG/M2 | HEIGHT: 64 IN | BODY MASS INDEX: 24.46 KG/M2 | BODY MASS INDEX: 24.46 KG/M2

## 2019-10-28 VITALS — SYSTOLIC BLOOD PRESSURE: 109 MMHG | DIASTOLIC BLOOD PRESSURE: 50 MMHG

## 2019-10-28 DIAGNOSIS — I50.32: ICD-10-CM

## 2019-10-28 DIAGNOSIS — N18.9: ICD-10-CM

## 2019-10-28 DIAGNOSIS — E87.1: ICD-10-CM

## 2019-10-28 DIAGNOSIS — R57.0: ICD-10-CM

## 2019-10-28 DIAGNOSIS — I50.23: ICD-10-CM

## 2019-10-28 DIAGNOSIS — E43: ICD-10-CM

## 2019-10-28 DIAGNOSIS — K82.9: ICD-10-CM

## 2019-10-28 DIAGNOSIS — J98.11: ICD-10-CM

## 2019-10-28 DIAGNOSIS — I95.9: ICD-10-CM

## 2019-10-28 DIAGNOSIS — D62: ICD-10-CM

## 2019-10-28 DIAGNOSIS — G93.41: ICD-10-CM

## 2019-10-28 DIAGNOSIS — I26.99: ICD-10-CM

## 2019-10-28 DIAGNOSIS — N40.0: ICD-10-CM
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
## 2019-10-28 DIAGNOSIS — I42.9: ICD-10-CM

## 2019-10-28 DIAGNOSIS — K57.93: Primary | ICD-10-CM

## 2019-10-28 DIAGNOSIS — N17.0: ICD-10-CM

## 2019-10-28 DIAGNOSIS — J96.01: ICD-10-CM

## 2019-10-28 DIAGNOSIS — R42: ICD-10-CM

## 2019-10-28 DIAGNOSIS — I46.9: ICD-10-CM

## 2019-10-28 DIAGNOSIS — I82.621: ICD-10-CM

## 2019-10-28 DIAGNOSIS — E78.5: ICD-10-CM

## 2019-10-28 DIAGNOSIS — I13.0: ICD-10-CM

## 2019-10-28 LAB
ALBUMIN SERPL-MCNC: 2.7 G/DL (ref 3.4–5)
ALP SERPL-CCNC: 156 U/L (ref 46–116)
ALT SERPL-CCNC: 182 U/L (ref 10–68)
ANION GAP SERPL CALC-SCNC: 14 MMOL/L (ref 8–16)
BASOPHILS NFR BLD AUTO: 0.1 % (ref 0–2)
BILIRUB SERPL-MCNC: 1.05 MG/DL (ref 0.2–1.3)
BUN SERPL-MCNC: 32 MG/DL (ref 7–18)
CALCIUM SERPL-MCNC: 8.4 MG/DL (ref 8.5–10.1)
CHLORIDE SERPL-SCNC: 97 MMOL/L (ref 98–107)
CO2 SERPL-SCNC: 25.7 MMOL/L (ref 21–32)
CREAT SERPL-MCNC: 1.8 MG/DL (ref 0.6–1.3)
EOSINOPHIL NFR BLD: 0.6 % (ref 0–7)
ERYTHROCYTE [DISTWIDTH] IN BLOOD BY AUTOMATED COUNT: 16.6 % (ref 11.5–14.5)
GLOBULIN SER-MCNC: 4.4 G/L
GLUCOSE SERPL-MCNC: 108 MG/DL (ref 74–106)
HCT VFR BLD CALC: 41.8 % (ref 42–54)
HGB BLD-MCNC: 13.9 G/DL (ref 13.5–17.5)
IMM GRANULOCYTES NFR BLD: 0.4 % (ref 0–5)
LYMPHOCYTES NFR BLD AUTO: 9.1 % (ref 15–50)
MCH RBC QN AUTO: 27.3 PG (ref 26–34)
MCHC RBC AUTO-ENTMCNC: 33.3 G/DL (ref 31–37)
MCV RBC: 82.1 FL (ref 80–100)
MONOCYTES NFR BLD: 9.3 % (ref 2–11)
NEUTROPHILS NFR BLD AUTO: 80.5 % (ref 40–80)
OSMOLALITY SERPL CALC.SUM OF ELEC: 273 MOSM/KG (ref 275–300)
PLATELET # BLD: 546 10X3/UL (ref 130–400)
PMV BLD AUTO: 9.4 FL (ref 7.4–10.4)
POTASSIUM SERPL-SCNC: 3.7 MMOL/L (ref 3.5–5.1)
PROT SERPL-MCNC: 7.1 G/DL (ref 6.4–8.2)
RBC # BLD AUTO: 5.09 10X6/UL (ref 4.2–6.1)
SODIUM SERPL-SCNC: 133 MMOL/L (ref 136–145)
TROPONIN I SERPL-MCNC: 0.04 NG/ML (ref 0–0.06)
WBC # BLD AUTO: 13.5 10X3/UL (ref 4.8–10.8)

## 2019-10-28 NOTE — HEMODYNAMI
PATIENT:BARRY TRUONG                            MEDICAL RECORD: [id number]
: 41                                            LOCATION:[facility]    D.2304
ACCT# [id number]                                       ADMISSION DATE: 10/28/19
 
 
 Generatedon:11/3/831941:39
Patient name: BARRY TRUONG Patient #: [id number] Visit #: [id number] SSN: 
: 1945
Date of study: 11/3/2019
Page: Of
Hemodynamic Procedure Report
****************************
Patient Data
Patient Demographics
Procedure consent was obtained
First Name: BARRY           Gender: Male
Last Name: ALEXI          : 1945
[facility] Initial: VIIPN     Age: 74 year(s)
Patient #: [id number]       Race: 
Visit #: [id number]       Ethnicity:  or
Accession #:                
[id number]
Additional ID: [id number]
Contact details
Address: [address] [place] [address]       Phone: [phone]
State: [place]
City: [facility]
Zip code: [address]
Past Medical History
Allergies: No known allergies
Admission
Admission Data
Admission Date: 10/28/2019  Admission Time: 19:28
Room #: 
Height (in.): 64            BSA: 1.67 (m2)
Height (cm.): 162.56        BMI: 23.69 (kg/m2)
Weight (lbs.): 138
Weight (kg.): 62.6
Procedure
Procedure Types
Cath Procedure
Peripheral Cath Diagnostic Procedure
Cath Lab Peripheral Procedures
Peripheral vascular Intervention
Thrombolysis/Thrombectomy
Thrombolysis Catheter Removal
Procedure Description
Procedure Date
Procedure Date: 11/3/2019
Procedure Start Time: 11:27
Procedure Staff
Name                            Function
Kevin Mcdonald MD                  Performing Physician
Deandra Rose RT                  Circulator
Tessie Mota RN                Nurse
SORAYA GOLDBERG RT                  Scrub
Procedure Data
Cath Procedure
Fluoroscopy
 
Diagnostic fluoroscopy      Total fluoroscopy Time: 0.2
time: 0.2 min               min
Diagnostic fluoroscopy      Total fluoroscopy dose: 82
dose: 82 mGy                mGy
Contrast Material
Contrast Material Type                       Amount (ml)
Isovue 300                                   10
Entry Location
Entry     Primary  Successful  Side  Size  Upsize Upsize Entry    Closure Succes
sful  Closure
Location                             (Fr)  1 (Fr) 2 (Fr) Remarks  Device        
      Remarks
Femoral                                                           Exoseal
artery
Procedure Medications
Medication           Administration Route Dosage
Heparin Flush Bag    added to field       1 bags
(1000units/500ml NS)
Lidocaine 1%         added to field       20
Hemodynamics
Rest
BSA: 1.67 (m2) O2 Consumption: Estimated: 211.77 (ml/min) O2 Consumption indexed
:
Estimated:126.81 (ml/min/m) Heart Rate: 102 (bpm)
Snapshots
Pre Cath      Intra         NCS           Post Cath
Vital Signs
Time     Heart  Resp   SPO2 etCO2   NIBP (mmHg) Rhythm  Pain    Sedation
Rate   (ipm)  (%)  (mmHg)                      Status  Level
(bpm)
11:14:30 101    36     85   0       Measuring   NSR     0 (11)  10(A)
, No
pain
11:14:59 91     34     86   0       125/78(107) NSR     0 (11)  10(A)
, No
pain
11:19:02 100    32     84   0       126/77(105) NSR     0 (11)  10(A)
, No
pain
11:23:04 101    37     86   0       126/81(104) NSR     0 (11)  10(A)
, No
pain
11:27:08 99     35     85   0       132/76(96)  NSR     0 (11)  10(A)
, No
pain
11:31:13 99     41     89   0       119/82(96)  NSR     0 (11)  10(A)
, No
pain
11:35:15 105    37     92   0       127/78(109) NSR     0 (11)  10(A)
, No
pain
11:39:21 104    37     87   0       131/74(112) NSR     0 (11)  10(A)
, No
pain
Medications
Time     Medication       Route  Dose  Verified Delivered Reason     Notes  Effe
ctiveness
by       by
11:15:45 Heparin Flush    added  1     Kevin Brown      used for
Bag              to     bags  Harry Mcdonald MD procedure
 
(1000units/500ml field        MD
NS)
11:15:56 Lidocaine 1%     added  20ml  Kevin Brown      for local
to     vial  Harry Mcdonald MD anesthetic
field        MD
Procedure Log
Time     Note
10:31:45 Patient Height : 64 inches
10:31:45 Patient Weight : 138 lbs
11:11:33 Time tracking: Call back (After hours or weekends)
11:11:41 Patient received from ICU to IR Alert and oriented. Tansferred to table
in Supine position.
11:11:54 Signed procedure consent form obtained from patient.
11:12:04 H&P Date Dictated: 11/3/2019 Within 30 days and on chart..
11:12:08 Family unavailable.
11:12:10 Patient NPO since Midnight.
11:12:27 Patient allergic to No known allergies
11:12:35 -----------------------------------------------------------------------
-
11:12:38 ECG and BP/O2 sat monitors applied to patient.
11:12:40 Vital chart was started
11:12:42 Baseline sample Acquired.
11:12:46 Full Disclosure recording started
11:12:57 -----------------------------------------------------------------------
-
11:13:06 Use device set IR Diagnostic
11:13:08 Tegaderm 4 x 4 ([id number]) opened to sterile field.
11:13:09 Sterile Angiographic Pack opened to sterile field.
11:13:10 Bag Decanter () opened to sterile field.
11:13:19 -----------------------------------------------------------------------
-
11:13:32 IV patent on arrival in right forearm with D5/.45%NaCl at KVO.
11:13:40 Right groin area was prepped with betadine and draped in sterile fashio
n
11:13:44 -----------------------------------------------------------------------
-
11:15:45 Heparin Flush Bag (1000units/500ml NS) 1 bags added to field was
administered by Kevin Mcdonald MD; used for procedure; Verbal order read
back and verified.
11:15:56 Lidocaine 1% 20ml vial added to field was administered by Kevin Mcdonald MD; for local anesthetic; Verbal order read back and verified.
11:26:49 Physician arrived
11:26:50 --------ALL STOP TIME OUT------
11:26:50 Final Timeout: patient, procedure, and site verified with staff and
physician. All members of the team are in agreement.
11:27:28 Procedure started.
11:27:37 Local anesthetic to right femoral artery with Lidocaine 1% by Kevin Mcdonald MD.**INITIAL ACCESS ONLY**
11:29:04 DOC .035 wire ([id number]) opened to sterile field.
11:30:03 SHEATH 5FR Gilchrist ([id number]) opened to sterile field.
11:33:57 EXOSEAL 5Fr () opened to sterile field.
11:35:24 A sheath was inserted into the Femoral artery
11:35:24 Sheath removed intact; hemostasis achieved with Exoseal to the Femoral
artery.
11:35:35 Procedure ended.(Physican Out)
11:35:45 Fluoroscopy time 00.20 minutes.
11:35:51 Fluoroscopy dose: 82 mGy
11:35:51 Flurop Dose total: 82
11:35:55 Contrast amount:Isovue 300 10ml.
11:35:58 Procedure and supply charges have been captured, reviewed, submitted an
 
d
are correct.
11:38:27 Report given to ICU.
11:39:27 Patient transfered to ICU with Bed.
11:39:49 Vital chart was stopped
Device Usage
Item Name    Manufacture  Quantity  Catalog     Hospital Part    Current Minimal
 Lot# /
Number      Charge   Number  Stock   Stock   Serial#
Code
Tegaderm 4 x 3M           1         1626W       [id number]   [id number]  [id number]  5
4 (1626W)
Sterile      Cardinal     1         [id number]  [id number]           [id number]  5
[employer]
Pack
Bag Decanter [employer]     1                880446   33077   482387  5
()      [employer]
DOC .035     [employer] 1         [id number]      [id number]           370806  5
wire
([id number])
SHEATH 5FR   Terumo       1         [id number]      278378   850787  450893  5
Gilchrist
([id number])
EXOSEAL 5Fr  Cardinal     1                538306   145706  762645  10     
 [id number]
()      Health
Signature Audit [place]
Stage           Time        Signature      Unsigned
Intra-Procedure 11/3/2019   Deandra Rose
11:39:44 AM RT(R)
 
 
 
 
 
 
 
 
 
 
 
 
 
 
 
 
 
 
 
 
 
 
 
 
 
[facility]                                          
[address] [id number]

## 2019-10-28 NOTE — NUR
[facility]OCKED CALL LIGHT PROVIDED LCTA
SKIN WARM AND DRY PT IS ON PHONE AND REFUSES TO ANSWER QUESTIONS AT THIS TIME

## 2019-10-28 NOTE — HEMODYNAMI
PATIENT:BARRY TRUONG                            MEDICAL RECORD: [id number]
: 41                                            LOCATION:[facility]    D.2304
ACCT# [id number]                                       ADMISSION DATE: 10/28/19
 
 
 Generatedon:201913:38
Patient name: BARRY TRUONG Patient #: [id number] Visit #: [id number] SSN: 
: 1945
Date of study: 2019
Page: Of
Hemodynamic Procedure Report
****************************
Patient Data
Patient Demographics
Procedure consent was obtained
First Name: BARRY           Gender: Male
Last Name: ALEXI          : 1945
[facility] Initial: VIPIN     Age: 74 year(s)
Patient #: [id number]       Race: 
Visit #: [id number]       Ethnicity:  or
Accession #:                
[id number]
Additional ID: [id number]
Contact details
Address: [address] [place] [address]       Phone: [phone]
State: [place]
City: [facility]
Zip code: [address]
Past Medical History
Allergies: No known allergies
Admission
Admission Data
Admission Date: 10/28/2019  Admission Time: 19:28
Room #: D.2304
Height (in.): 64            BSA: 1.67 (m2)
Height (cm.): 162.56        BMI: 23.69 (kg/m2)
Weight (lbs.): 138
Weight (kg.): 62.6
Procedure
Procedure Types
Cath Procedure
Peripheral Cath Diagnostic Procedure
Cath Lab Peripheral Procedures
Abd/Extremity
Visceral/Mesenteric
Mesenteric Arteriogram (Abd Artery)
Procedure Description
Procedure Date
Procedure Date: 2019
Procedure Start Time: 12:50
Procedure Staff
Name                            Function
Fan Stevens MD               Performing Physician
Deandra Rose RT                  Circulator
Tessie Mota RN                Nurse
Elizabeth Reyes RN                 Nurse
SORAYA GOLDBERG RT                  Scrub
Procedure Data
Cath Procedure
 
Fluoroscopy
Diagnostic fluoroscopy      Total fluoroscopy Time: 4.1
time: 4.1 min               min
Diagnostic fluoroscopy      Total fluoroscopy dose: 363
dose: 363 mGy               mGy
Contrast Material
Contrast Material Type                       Amount (ml)
Isovue 300                                   35
Diagnostic catheters
Device Type               Used For           End Catheter
Placement
Angiodynamics SOS OMNI 2
NON B 5FR 65CM catheter
([id number])
Procedure Medications
Medication           Administration Route Dosage
Lidocaine 1%         added to field       20
Heparin Flush Bag    added to field       3 bags
(1000units/500ml NS)
Hemodynamics
Rest
BSA: 1.67 (m2) O2 Consumption: Estimated: 191.01 (ml/min) O2 Consumption indexed
:
Estimated:114.38 (ml/min/m) Heart Rate: 69 (bpm)
Snapshots
Pre Cath      Intra         NCS           Post Cath
Vital Signs
Time     Heart  Resp   SPO2 etCO2   NIBP (mmHg) Rhythm  Pain    Sedation
Rate   (ipm)  (%)  (mmHg)                      Status  Level
(bpm)
12:26:04        21     100  21.2    144/71(118) NSR     0 (11)  10(A)
, No
pain
12:30:20 70     31     100  21.2    139/71(115) NSR     0 (11)  10(A)
, No
pain
12:34:32 69     20     99   24.3    138/71(118) NSR     0 (11)  10(A)
, No
pain
12:38:44 69     21     100  23.5    136/73(120) NSR     0 (11)  10(A)
, No
pain
12:42:56 70     21     100  23.5    143/72(111) NSR     0 (11)  9(A)
, No
pain
12:47:10 69     18     100  26.5    127/67(116) NSR     0 (11)  9(A)
, No
pain
12:51:18 69     22     98   22      139/69(110) NSR     0 (11)  9(A)
, No
pain
12:55:30 69     22     100  22.8    145/69(119) NSR     0 (11)  9(A)
, No
pain
12:59:44 68     21     100  22.7    137/72(115) NSR     0 (11)  9(A)
, No
pain
13:03:56 69     35     100  20.5    130/70(109) NSR     0 (11)  9(A)
, No
pain
 
13:08:05 67     20     100  20.5    135/72(117) NSR     0 (11)  9(A)
, No
pain
13:12:19 68     24     100  22.7    138/66(112) NSR     0 (11)  9(A)
, No
pain
13:16:33 68     18     100  25.8    129/64(98)  NSR     0 (11)  9(A)
, No
pain
13:20:50 67     22     100  18.9    135/48(114) NSR     0 (11)  9(A)
, No
pain
13:25:00 68     25     100  20.5    133/71(109) NSR     0 (11)  9(A)
, No
pain
13:29:11 67     25     100  21.2    132/66(115) NSR     0 (11)  9(A)
, No
pain
13:33:23 66     25     98   22      132/69(112) NSR     0 (11)  9(A)
, No
pain
13:35:18 68     30     100  23.5    143/72(125) NSR     0 (11)  9(A)
, No
pain
Medications
Time     Medication       Route  Dose  Verified Delivered Reason     Notes  Effe
ctiveness
by       by
12:45:33 Lidocaine 1%     added  20ml  Fan Chavira     for local
to     vial  Rodney Stevens  anesthetic
field MD SIM
12:45:53 Heparin Flush    added  3     Fan Chavira     used for
Bag              to     bags  Rodney Stevens  procedure
(1000units/500ml field        MD SIM
NS)
Procedure Log
Time     Note
11:55:42 Patient Height : 64 inches
11:55:48 Patient Weight : 138 lbs
11:55:52 Use device set IR Diagnostic
11:55:53 Tegaderm 4 x 4 (1626W) opened to sterile field.
11:55:54 Sterile Angiographic Pack opened to sterile field.
11:55:55 Bag Decanter (2002S) opened to sterile field.
11:55:56 ACIST Manifold (15454) opened to sterile field.
11:55:57 ACIST Hand Control (24325) opened to sterile field.
11:55:57 ACIST Syringe (87242) opened to sterile field.
11:57:16 SHEATH 5FR [place] (XYM425) opened to sterile field.
11:57:18 Micropuncture VSI 4FR kit opened to sterile field.
11:57:19 BENTSON 145cm wire (Q49723) opened to sterile field.
12:16:17 Time tracking: Regular hours (M-F 7:00 - 5:00)
12:16:26 Plan of Care:Hemodynamics will remain stable., Cardiac rhythm will
remain stable., Comfort level will be maintained., Respiratory function
will remain adequate., Patient/ family verbilizes understanding of
procedure., Procedure tolerated without complication., Recovers from
procedure without complications..
12:16:40 Patient received from Outpatients to IR Alert and oriented. Tansferred
to table in Supine position.
12:16:47 Signed procedure consent form obtained from verbally.
12:16:56 H&P Date Dictated: 2019 Within 30 days and on chart..
12:16:58 Pre-procedure instructions explained to patient.
 
12:16:58 Pre-op teaching completed and patient verbalized understanding.
12:17:03 Family in waiting room.
12:17:07 Patient NPO since Midnight.
12:17:16 Patient allergic to No known allergies
12:17:19 Is the patient allergic to Iodine/contrast media? No.
12:17:33 Is patient on blood thinner?No
12:17:42 Patient diabetic? No.
12:17:49 ----Pre-sedation anethsthesia assessment.----
12:17:52 Previous problem with sedation/anesthesia? No ?
12:17:57 Snore? No
12:18:01 Sleep apnea? No
12:18:05 Deviated septum? No
12:18:14 Opens mouth fully? Yes
12:18:20 Sticks out tongue? Yes
12:18:23 Airway obstruction? No ?
12:18:29 Dentures? No ?
12:18:50 IV patent on arrival in Right upper arm with D5/.45%NaCl at [facility].
12:24:49 Vital chart was started
12:31:16 Pre procedure: right dorsailis pedis pulse Doppler
12:31:20 Pre procedure: right posterior tibial pulse Doppler
12:36:56 ECG and BP/O2 sat monitors applied to patient.
12:37:16 TUBING Contrast Injection High Pressure ([id number]) opened to sterile
field.
12:37:38 Baseline sample Acquired.
12:37:47 Full Disclosure recording started
12:37:47 -----------------------------------------------------------------------
-
12:39:14 A Angiodynamics SOS OMNI 2 NON B 5FR 65CM catheter ([id number]) was
advanced over the wire and used for .
12:45:33 Lidocaine 1% 20ml vial added to field was administered by Fan bonilla MD; for local anesthetic; Verbal order read back and verified.
12:45:53 Heparin Flush Bag (1000units/500ml NS) 3 bags added to field was
administered by Fan Stevens MD; used for procedure; Verbal order read
back and verified.
12:47:56 -----------------------------------------------------------------------
-
12:48:04 Physician arrived
12:48:05 --------ALL STOP TIME OUT------
12:48:06 Final Timeout: patient, procedure, and site verified with staff and
physician. All members of the team are in agreement.
12:49:03 Fire Safety Assessment: A--An alcohol-based skin anteseptic being used
preoperatively., C--Open oxygen or nitrous oxide is being used.
12:49:12 3b) 30-44 Moderately reduced kidney function.
12:49:46 Maximum allowable contrast dose (3.7 X eGFR X 0.75)97.125 ml.
12:50:30 Procedure started.
12:50:37 Local anesthetic to right femoral artery with Lidocaine 1% by Fan Stevens MD.**INITIAL ACCESS ONLY**
12:50:51 Arterial access obtained using ultrasound guidance.
13:00:02 Direxion Straight Microcatheter ([id number]) opened to sterile field.
13:00:11 TRANSEND STEERABLE wire ([id number]) opened to sterile field.
13:00:17 COPILOT Valve Control (1502843) opened to sterile field.
13:10:12 GLIDE WIRE GT DOUBLE ANGLE .018 (RG*[id number]) opened to sterile field.
13:19:49 SUTURE ETHILON 2-0 BLK MONO FS opened to sterile field.
13:26:16 Procedure ended.(Physican Out)
13:27:02 Fluoroscopy time 04.10 minutes.
13:27:09 Fluoroscopy dose: 363 mGy
13:27:09 Flurop Dose total: 363
13:27:18 Contrast amount:Isovue 300 35ml.
13:27:27 Procedure and supply charges have been captured, reviewed, submitted an
 
d
are correct.
13:38:00 Report given to ICU.
13:38:29 Vital chart was stopped
Device Usage
Item Name     Manufacture   Quantity  Catalog      Hospital Part    Current Mini
mal Lot# /
Number       Charge   Number  Stock   Stock   Serial#
Code
Tegaderm 4 x  3M            1         1626W        [id number]   208865  628100  5
4 (1626W)
Sterile       Cardinal      1         [id number]   919457           746259  5
Angiographic  Health
Pack
Bag Decanter  Microtek      1         S        069197   60148   200248  5
(S)       Medical Inc.
ACIST         Acist Medical 1         10606        252796   892657  120761  5
Manifold      Systems Inc
(95763)
ACIST Hand    Acist Medical 1         24857        474024   222078  947182  5
Control       Systems Inc
(32383)
ACIST Syringe Acist Medical 1         67893        715883   881185  778910  20
(47568)       Systems Inc
SHEATH 5FR    Terumo        1         JLG001       924650   255100  960744  5
[place]
(DLK868)
Micropuncture VSI VASCULAR  1         7266V        451518           347224  5
VSI 4FR kit   SOLUTIONS
BENTSON 145cm [employer]  1         T89081       074813           398643  5
wire (R85738)
TUBING        [facility] 1         TVE535W      417039   376822  250472  5   
    [id number]
Contrast
Injection
High Pressure
([id number])
Angiodynamics Angiodynamics 1         22205879     350038   02013   192480  5
SOS OMNI 2
NON B 5FR
65CM catheter
([id number])
Direxion      [place]        1         [id number]   003206   279539  667055  5
Straight      Scientific
Microcatheter
([id number])
TRANSEND      [place]        1         [id number]   612353           343928  5
STEERABLE     Scientific
wire
([id number])
COPILOT Valve Abbott        1         3963598      246291   178610  567246  5
Control       Vascular
(0206204)
GLIDE WIRE GT Terumo        1         [id number]  020430           447407  5
DOUBLE ANGLE
.018
(RG*[id number])
SUTURE        Ethicon       1         664H         359098           954104  5
ETHILON 2-0
BLK MONO FS
 
Signature Audit Morse
Stage           Time        Signature      Unsigned
Intra-Procedure 2019   Deandra Rose
1:38:24 PM  RT(R)
 
 
 
 
 
 
 
 
 
 
 
 
 
 
 
 
 
 
 
 
 
 
 
 
 
 
 
 
 
 
 
 
 
 
 
 
 
 
 
 
 
 
 
 
 
 
 
 
 
 
 
[facility]                                          
 [place] [address]

## 2019-10-29 VITALS — SYSTOLIC BLOOD PRESSURE: 101 MMHG | DIASTOLIC BLOOD PRESSURE: 55 MMHG

## 2019-10-29 VITALS — SYSTOLIC BLOOD PRESSURE: 122 MMHG | DIASTOLIC BLOOD PRESSURE: 67 MMHG

## 2019-10-29 VITALS — SYSTOLIC BLOOD PRESSURE: 104 MMHG | DIASTOLIC BLOOD PRESSURE: 50 MMHG

## 2019-10-29 VITALS — SYSTOLIC BLOOD PRESSURE: 102 MMHG | DIASTOLIC BLOOD PRESSURE: 61 MMHG

## 2019-10-29 VITALS — SYSTOLIC BLOOD PRESSURE: 132 MMHG | DIASTOLIC BLOOD PRESSURE: 54 MMHG

## 2019-10-29 VITALS — SYSTOLIC BLOOD PRESSURE: 105 MMHG | DIASTOLIC BLOOD PRESSURE: 50 MMHG

## 2019-10-29 LAB
ANION GAP SERPL CALC-SCNC: 10.6 MMOL/L (ref 8–16)
APPEARANCE UR: CLEAR
APTT BLD: 41.2 SECONDS (ref 22.8–39.4)
BASOPHILS NFR BLD AUTO: 0.1 % (ref 0–2)
BILIRUB SERPL-MCNC: NEGATIVE MG/DL
BUN SERPL-MCNC: 30 MG/DL (ref 7–18)
CALCIUM SERPL-MCNC: 7.2 MG/DL (ref 8.5–10.1)
CHLORIDE SERPL-SCNC: 105 MMOL/L (ref 98–107)
CO2 SERPL-SCNC: 26.2 MMOL/L (ref 21–32)
COLOR UR: YELLOW
CREAT SERPL-MCNC: 1.6 MG/DL (ref 0.6–1.3)
EOSINOPHIL NFR BLD: 1.5 % (ref 0–7)
ERYTHROCYTE [DISTWIDTH] IN BLOOD BY AUTOMATED COUNT: 16.5 % (ref 11.5–14.5)
GLUCOSE SERPL-MCNC: 112 MG/DL (ref 74–106)
GLUCOSE SERPL-MCNC: NEGATIVE MG/DL
HCT VFR BLD CALC: 36.7 % (ref 42–54)
HGB BLD-MCNC: 11.9 G/DL (ref 13.5–17.5)
IMM GRANULOCYTES NFR BLD: 0.5 % (ref 0–5)
INR PPP: 1.39 (ref 0.85–1.17)
KETONES UR STRIP-MCNC: NEGATIVE MG/DL
LYMPHOCYTES NFR BLD AUTO: 9.5 % (ref 15–50)
MAGNESIUM SERPL-MCNC: 1.9 MG/DL (ref 1.8–2.4)
MCH RBC QN AUTO: 26.8 PG (ref 26–34)
MCHC RBC AUTO-ENTMCNC: 32.4 G/DL (ref 31–37)
MCV RBC: 82.7 FL (ref 80–100)
MONOCYTES NFR BLD: 11.5 % (ref 2–11)
NEUTROPHILS NFR BLD AUTO: 76.9 % (ref 40–80)
NITRITE UR-MCNC: NEGATIVE MG/ML
NT-PROBNP SERPL-MCNC: 1909 PG/ML (ref 0–450)
OSMOLALITY SERPL CALC.SUM OF ELEC: 284 MOSM/KG (ref 275–300)
PH UR STRIP: 5 [PH] (ref 5–6)
PHOSPHATE SERPL-MCNC: 3.6 MG/DL (ref 2.5–4.9)
PLATELET # BLD: 486 10X3/UL (ref 130–400)
PMV BLD AUTO: 9.4 FL (ref 7.4–10.4)
POTASSIUM SERPL-SCNC: 2.8 MMOL/L (ref 3.5–5.1)
PROT UR-MCNC: NEGATIVE MG/DL
PROTHROMBIN TIME: 16.5 SECONDS (ref 11.6–15)
RBC # BLD AUTO: 4.44 10X6/UL (ref 4.2–6.1)
SODIUM SERPL-SCNC: 139 MMOL/L (ref 136–145)
SP GR UR STRIP: 1.01 (ref 1–1.02)
UROBILINOGEN UR-MCNC: NORMAL MG/DL
WBC # BLD AUTO: 10.1 10X3/UL (ref 4.8–10.8)

## 2019-10-29 NOTE — NUR
PATIENT GAVE PERMISSION FOR THIS WRITER TO SPEAK TO JUAN HIS OTHER
DAUGHTER THAT LIVES IN [place].

## 2019-10-29 NOTE — NUR
20 GAUGE IV REMOVED FROM RIGHT FOREARM AS IV INFILTRATED. WARM COMPRESS PLACED
TO AREA. D5LR WAS THE INFUSING FLUID. PATIENT DENIES PAIN TO AREA. NO S/S
INFECTIONS. AREA PROPT ON PILLOW. NO DISTRESS.

## 2019-10-29 NOTE — NUR
RECEIVED REPORT. ASSUMED CARE OF PATIENT. RESTING WITH EYES OPEN. CALL LIGHT
WITHIN REACH. IV FLUIDS INFUSING AS ORDERED. PATIENT DENIES PAIN AND HAS NO
REQUESTS AT THIS TIME. BEDSIDE SHIFT REPORT COMPLETED.

## 2019-10-30 VITALS — DIASTOLIC BLOOD PRESSURE: 61 MMHG | SYSTOLIC BLOOD PRESSURE: 102 MMHG

## 2019-10-30 VITALS — DIASTOLIC BLOOD PRESSURE: 56 MMHG | SYSTOLIC BLOOD PRESSURE: 82 MMHG

## 2019-10-30 VITALS — SYSTOLIC BLOOD PRESSURE: 121 MMHG | DIASTOLIC BLOOD PRESSURE: 39 MMHG

## 2019-10-30 VITALS — SYSTOLIC BLOOD PRESSURE: 106 MMHG | DIASTOLIC BLOOD PRESSURE: 54 MMHG

## 2019-10-30 LAB
ANION GAP SERPL CALC-SCNC: 9.3 MMOL/L (ref 8–16)
BASOPHILS NFR BLD AUTO: 0.2 % (ref 0–2)
BUN SERPL-MCNC: 20 MG/DL (ref 7–18)
CALCIUM SERPL-MCNC: 8.1 MG/DL (ref 8.5–10.1)
CHLORIDE SERPL-SCNC: 100 MMOL/L (ref 98–107)
CO2 SERPL-SCNC: 28.1 MMOL/L (ref 21–32)
CREAT SERPL-MCNC: 1.5 MG/DL (ref 0.6–1.3)
EOSINOPHIL NFR BLD: 3.1 % (ref 0–7)
ERYTHROCYTE [DISTWIDTH] IN BLOOD BY AUTOMATED COUNT: 16.6 % (ref 11.5–14.5)
GLUCOSE SERPL-MCNC: 138 MG/DL (ref 74–106)
HCT VFR BLD CALC: 38.8 % (ref 42–54)
HGB BLD-MCNC: 12.5 G/DL (ref 13.5–17.5)
IMM GRANULOCYTES NFR BLD: 0.4 % (ref 0–5)
LYMPHOCYTES NFR BLD AUTO: 9.7 % (ref 15–50)
MAGNESIUM SERPL-MCNC: 1.9 MG/DL (ref 1.8–2.4)
MCH RBC QN AUTO: 26.5 PG (ref 26–34)
MCHC RBC AUTO-ENTMCNC: 32.2 G/DL (ref 31–37)
MCV RBC: 82.2 FL (ref 80–100)
MONOCYTES NFR BLD: 10.8 % (ref 2–11)
NEUTROPHILS NFR BLD AUTO: 75.8 % (ref 40–80)
OSMOLALITY SERPL CALC.SUM OF ELEC: 272 MOSM/KG (ref 275–300)
PHOSPHATE SERPL-MCNC: 1.9 MG/DL (ref 2.5–4.9)
PLATELET # BLD: 523 10X3/UL (ref 130–400)
PMV BLD AUTO: 9.5 FL (ref 7.4–10.4)
POTASSIUM SERPL-SCNC: 3.4 MMOL/L (ref 3.5–5.1)
RBC # BLD AUTO: 4.72 10X6/UL (ref 4.2–6.1)
SODIUM SERPL-SCNC: 134 MMOL/L (ref 136–145)
WBC # BLD AUTO: 9.6 10X3/UL (ref 4.8–10.8)

## 2019-10-30 NOTE — MORECARE
CASE MANAGEMENT DISCHARGE SUMMARY
 
 
PATIENT: BARRY TRUONG               UNIT: [id number]
ACCOUNT#: [id number]                       ADM DATE: 10/28/19
AGE: 74     : 41  SEX: M            ROOM/BED: D.8834    
AUTHOR: TANK SHINE                             PHYSICIAN:                               
 
REFERRING PHYSICIAN: AMARI DUARET DO            
DATE OF SERVICE: 10/30/19
Discharge Plan
 
 
Patient Name: BARRY TRUONG
Facility: [facility]
Encounter #: [id number]
Medical Record #: [id number]
: 1945
Planned Disposition: Home with Home Health
Anticipated Discharge Date: 
 
Discharge Date: 
Expected LOS: 
Initial Reviewer: [email or web address]
Initial Review Date: 10/30/2019
Generated: 10/30/19   6:04 pm 
Comments
 
DCP- Discharge Planning
 
Updated by [email or web address]: Scott Sun on 10/30/19   3:56 pm CT
Patient Name: BARRY TRUONG                                     
Admission Status: ER   
Accout number: [id number]                              
Admission Date: 10-   
: 1945                                                        
Admission Diagnosis:   
Attending: AMARI DUARTE                                                
Current LOS:  2   
  
Anticipated DC Date:    
Planned Disposition: Home with Home Health   
Primary Insurance: [employer] CHOICE PPO MCR ADVANT   
PLANNED EXTERNAL PROVIDER:  [facility]  
  
Discharge Planning Comments:   
CM RECEIVED ORDER TO CONSULT WITH PT REGARDING DISCHARGE NEEDS.  CM MET WITH 
PT IN ROOM TO DISCUSS DISCHARGE PLANNING AND NEEDS. PT REPORTS LIVING AT HOME 
INDEPENDENTLY AND ALONE. PT HAS CANE AND WALKER WITH NO MEDICAL EQUIPMENT  
PROVIDER PREFERENCE.  PT HAD [facility] BUT IT HAD ALREADY STOPPED; 
 
PT HAS NO OUTSIDE SERVICES ASSISTING IN THE HOME. CM DISCUSSED AVAILABILITY 
OF HOME HEALTH, REHAB SERVICES AND MEDICAL EQUIPMENT. PT DENIES NEED OF 
INPATIENT OR SKILLED REHAB.  PT WANTS HOME HEALTH WITH [facility]; PT STATES HIS 
DAUGHTER WILL BE COOKING AND CLEANING FOR HIM AFTER DISCHARGE, BUT HE WILL 
STILL BE LIVING ALONE.  PT REPORTS HIS DAUGHTER WILL PICK HIM UP FOR 
DISCHARGE HOME. IMPORTANT MESSAGE FROM MEDICARE PROVIDED AND EXPLAINED.  
CHOICE FOR [facility] SIGNED.    
  
CM TO ARRANGE [facility] FOR DISCHARGE HOME WITH PHYSICIAN AGREEMENT 
AND ORDERS.  PCP IS DR. NEGRETE.  CM TO CONTINUE TO FOLLOW AND ASSIST AS NEEDED. 
  
: Scott Sun
 DCPIA - Discharge Planning Initial Assessment
 
Updated by [email or web address]: Scott Sun on 10/30/19   4:52 pm
*  Is the patient Alert and Oriented?
Yes
*  How many steps to enter\exit or inside your home? NONE *  PCP DR. NEGRETE *  Pharmacy
[facility]M - [facility]
 
*  Preadmission Environment
Home Alone
*  ADLs
Independent
*  Equipment
Cane
Rolling Walker
*  Other Equipment
NO MEDICAL EQUIPMENT PROVIDER PREFERENCE
*  List name and contact numbers for known caregivers / representatives who 
currently or will assist patient after discharge:
NAE TRUONG, R, [phone]
*  Verbal permission to speak to the caregivers and representatives has been 
obtained from the patient.
N/A
*  Community resources currently utilized
None
*  Please name any agencies selected above.
NONE
*  Additional services required to return to the preadmission environment?
No
*  Can the patient safely return to the preadmission environment?
Yes
*  Has this patient been hospitalized within the prior 30 days at any 
hospital?
No
 
 
 
 
 
Coverage Notice
 
 
Reviewer: [email or web address] Sun
 
Notice Issued Date-Time: 10/30/2019  16:40
Notice Type: IM Discharge Notice
 
Notice Delivered To: Patient
Relationship to Patient: 
Representative Name: 
 
Delivery Method: HAND - Hand Delivered
Bell Days:
Prior Verbal Notification: 
 
Recipient Understood Notice: Yes
Recipient Signature: Yes
Med Rec Note Co-signed by Attending:
 
Coverage Notice Comment:  
Reviewer: [email or web address] Sun
 
Notice Issued Date-Time: 10/30/2019  16:40
Notice Type: Patient Choice Letter
 
Notice Delivered To: Patient
Relationship to Patient: 
Representative Name: 
 
Delivery Method: HAND - Hand Delivered
Bell Days:
Prior Verbal Notification: 
 
Recipient Understood Notice: Yes
Recipient Signature: Yes
Med Rec Note Co-signed by Attending:
 
Coverage Notice Comment:  [facility]
 
Last DP export: 10/30/19   3:53 
Patient Name: BARRY TRUONG
 
Encounter #: [id number]
Page [phone]
 
 
 
 
 
Electronically Signed by TANK SHINE on 10/30/19 at 1704
 
 
 
 
 
 
**All edits/amendments must be made on the electronic document**
 
DICTATION DATE: 10/30/19 1704     : DILMA  10/30/19 1704     
RPT#: [id number]                                DC DATE:        
                                               STATUS: ADM IN  
[facility]
[address] [place] [address]
***END OF REPORT***

## 2019-10-30 NOTE — NUR
PATIENT IS NAUSEATED AND DRY HEAVING. GIVEN MORPHINE AND ZOFRAN PRN AS
ORDERED. HE IS SHAKING AND HAS A COLD SWEAT. NAUSEA AND PAIN HAS SUBSIDED A
LITTLE AFTER THE PRN MEDS GIVEN.

## 2019-10-30 NOTE — MORECARE
CASE MANAGEMENT DISCHARGE SUMMARY
 
 
PATIENT: BARRY TRUONG               UNIT: [id number]
ACCOUNT#: [id number]                       ADM DATE: 10/28/19
AGE: 74     : 41  SEX: M            ROOM/BED: D.2133    
AUTHOR: TANK SHINE                             PHYSICIAN:                               
 
REFERRING PHYSICIAN: AMARI DUARTE DO            
DATE OF SERVICE: 10/30/19
Discharge Plan
 
 
Patient Name: BARRY TRUONG
Facility: [facility]
Encounter #: [id number]
Medical Record #: [id number]
: 1945
Planned Disposition: Home with Home Health
Anticipated Discharge Date: 
 
Discharge Date: 
Expected LOS: 
Initial Reviewer: [email or web address]
Initial Review Date: 10/30/2019
Generated: 10/30/19   5:53 pm 
 DCPIA - Discharge Planning Initial Assessment
 
Updated by KAY: Scott Sun on 10/30/19   4:52 pm
*  Is the patient Alert and Oriented?
Yes
*  How many steps to enter\exit or inside your home? NONE *  PCP DR. NEGRETE *  Pharmacy
[facility]  SHORT TERM [facility]
 
*  Preadmission Environment
Home Alone
*  ADLs
Independent
*  Equipment
Cane
Rolling Walker
*  Other Equipment
NO MEDICAL EQUIPMENT PROVIDER PREFERENCE
*  List name and contact numbers for known caregivers / representatives who 
currently or will assist patient after discharge:
NAE TRUONG, DTR, [phone]
*  Verbal permission to speak to the caregivers and representatives has been 
obtained from the patient.
N/A
*  Community resources currently utilized
 
None
*  Please name any agencies selected above.
NONE
*  Additional services required to return to the preadmission environment?
No
*  Can the patient safely return to the preadmission environment?
Yes
*  Has this patient been hospitalized within the prior 30 days at any 
hospital?
No
 
 
 
 
 
Coverage Notice
 
Reviewer: [id number] Jenny Sun
 
Notice Issued Date-Time: 10/30/2019  16:40
Notice Type: IM Discharge Notice
 
Notice Delivered To: Patient
Relationship to Patient: 
Representative Name: 
 
Delivery Method: HAND - Hand Delivered
Bell Days:
Prior Verbal Notification: 
 
Recipient Understood Notice: Yes
Recipient Signature: Yes
Med Rec Note Co-signed by Attending:
 
Coverage Notice Comment:  
Reviewer: [id number] Sun
 
Notice Issued Date-Time: 10/30/2019  16:40
Notice Type: Patient Choice Letter
 
Notice Delivered To: Patient
Relationship to Patient: 
Representative Name: 
 
Delivery Method: HAND - Hand Delivered
Bell Days:
Prior Verbal Notification: 
 
Recipient Understood Notice: Yes
Recipient Signature: Yes
Med Rec Note Co-signed by Attending:
 
 
Coverage Notice Comment:  [facility]
Patient Name: BARRY TRUONG
Encounter #: [id number]
Page [phone]
 
 
 
 
 
Electronically Signed by TANK SHINE on 10/30/19 at 1653
 
 
 
 
 
 
**All edits/amendments must be made on the electronic document**
 
DICTATION DATE: 10/30/19 1653     : DILMA  10/30/19 1653     
RPT#: [id number]                                DC DATE:        
                                               STATUS: ADM IN  
[facility]
[address] [place] [address]
***END OF REPORT*** No

## 2019-10-31 VITALS — DIASTOLIC BLOOD PRESSURE: 55 MMHG | SYSTOLIC BLOOD PRESSURE: 117 MMHG

## 2019-10-31 VITALS — DIASTOLIC BLOOD PRESSURE: 61 MMHG | SYSTOLIC BLOOD PRESSURE: 98 MMHG

## 2019-10-31 VITALS — SYSTOLIC BLOOD PRESSURE: 80 MMHG | DIASTOLIC BLOOD PRESSURE: 53 MMHG

## 2019-10-31 VITALS — SYSTOLIC BLOOD PRESSURE: 106 MMHG | DIASTOLIC BLOOD PRESSURE: 58 MMHG

## 2019-10-31 VITALS — DIASTOLIC BLOOD PRESSURE: 60 MMHG | SYSTOLIC BLOOD PRESSURE: 99 MMHG

## 2019-10-31 VITALS — SYSTOLIC BLOOD PRESSURE: 81 MMHG | DIASTOLIC BLOOD PRESSURE: 45 MMHG

## 2019-10-31 VITALS — SYSTOLIC BLOOD PRESSURE: 84 MMHG | DIASTOLIC BLOOD PRESSURE: 52 MMHG

## 2019-10-31 VITALS — SYSTOLIC BLOOD PRESSURE: 76 MMHG | DIASTOLIC BLOOD PRESSURE: 50 MMHG

## 2019-10-31 VITALS — SYSTOLIC BLOOD PRESSURE: 98 MMHG | DIASTOLIC BLOOD PRESSURE: 62 MMHG

## 2019-10-31 VITALS — SYSTOLIC BLOOD PRESSURE: 104 MMHG | DIASTOLIC BLOOD PRESSURE: 51 MMHG

## 2019-10-31 VITALS — SYSTOLIC BLOOD PRESSURE: 127 MMHG | DIASTOLIC BLOOD PRESSURE: 84 MMHG

## 2019-10-31 VITALS — DIASTOLIC BLOOD PRESSURE: 64 MMHG | SYSTOLIC BLOOD PRESSURE: 127 MMHG

## 2019-10-31 VITALS — DIASTOLIC BLOOD PRESSURE: 48 MMHG | SYSTOLIC BLOOD PRESSURE: 93 MMHG

## 2019-10-31 VITALS — DIASTOLIC BLOOD PRESSURE: 51 MMHG | SYSTOLIC BLOOD PRESSURE: 88 MMHG

## 2019-10-31 VITALS — SYSTOLIC BLOOD PRESSURE: 111 MMHG | DIASTOLIC BLOOD PRESSURE: 55 MMHG

## 2019-10-31 VITALS — SYSTOLIC BLOOD PRESSURE: 122 MMHG | DIASTOLIC BLOOD PRESSURE: 62 MMHG

## 2019-10-31 VITALS — DIASTOLIC BLOOD PRESSURE: 54 MMHG | SYSTOLIC BLOOD PRESSURE: 64 MMHG

## 2019-10-31 VITALS — DIASTOLIC BLOOD PRESSURE: 54 MMHG | SYSTOLIC BLOOD PRESSURE: 72 MMHG

## 2019-10-31 VITALS — SYSTOLIC BLOOD PRESSURE: 88 MMHG | DIASTOLIC BLOOD PRESSURE: 44 MMHG

## 2019-10-31 VITALS — DIASTOLIC BLOOD PRESSURE: 58 MMHG | SYSTOLIC BLOOD PRESSURE: 117 MMHG

## 2019-10-31 VITALS — SYSTOLIC BLOOD PRESSURE: 103 MMHG | DIASTOLIC BLOOD PRESSURE: 65 MMHG

## 2019-10-31 VITALS — SYSTOLIC BLOOD PRESSURE: 86 MMHG | DIASTOLIC BLOOD PRESSURE: 54 MMHG

## 2019-10-31 VITALS — SYSTOLIC BLOOD PRESSURE: 94 MMHG | DIASTOLIC BLOOD PRESSURE: 64 MMHG

## 2019-10-31 VITALS — SYSTOLIC BLOOD PRESSURE: 110 MMHG | DIASTOLIC BLOOD PRESSURE: 79 MMHG

## 2019-10-31 VITALS — SYSTOLIC BLOOD PRESSURE: 100 MMHG | DIASTOLIC BLOOD PRESSURE: 60 MMHG

## 2019-10-31 VITALS — DIASTOLIC BLOOD PRESSURE: 50 MMHG | SYSTOLIC BLOOD PRESSURE: 88 MMHG

## 2019-10-31 VITALS — DIASTOLIC BLOOD PRESSURE: 56 MMHG | SYSTOLIC BLOOD PRESSURE: 111 MMHG

## 2019-10-31 VITALS — DIASTOLIC BLOOD PRESSURE: 60 MMHG | SYSTOLIC BLOOD PRESSURE: 107 MMHG

## 2019-10-31 VITALS — SYSTOLIC BLOOD PRESSURE: 103 MMHG | DIASTOLIC BLOOD PRESSURE: 57 MMHG

## 2019-10-31 VITALS — DIASTOLIC BLOOD PRESSURE: 58 MMHG | SYSTOLIC BLOOD PRESSURE: 110 MMHG

## 2019-10-31 VITALS — SYSTOLIC BLOOD PRESSURE: 90 MMHG | DIASTOLIC BLOOD PRESSURE: 75 MMHG

## 2019-10-31 LAB
ANION GAP SERPL CALC-SCNC: 11.3 MMOL/L (ref 8–16)
ANISOCYTOSIS BLD QL SMEAR: (no result)
APTT BLD: 45.5 SECONDS (ref 22.8–39.4)
BUN SERPL-MCNC: 20 MG/DL (ref 7–18)
BURR CELLS BLD QL SMEAR: (no result)
CALCIUM SERPL-MCNC: 7.8 MG/DL (ref 8.5–10.1)
CHLORIDE SERPL-SCNC: 102 MMOL/L (ref 98–107)
CO2 SERPL-SCNC: 24.4 MMOL/L (ref 21–32)
CREAT SERPL-MCNC: 1.9 MG/DL (ref 0.6–1.3)
EOSINOPHIL NFR BLD: 2 % (ref 0–7)
ERYTHROCYTE [DISTWIDTH] IN BLOOD BY AUTOMATED COUNT: 16.7 % (ref 11.5–14.5)
GLUCOSE SERPL-MCNC: 88 MG/DL (ref 74–106)
HCT VFR BLD CALC: 31.9 % (ref 42–54)
HCT VFR BLD CALC: 32.8 % (ref 42–54)
HCT VFR BLD CALC: 36 % (ref 42–54)
HGB BLD-MCNC: 10.4 G/DL (ref 13.5–17.5)
HGB BLD-MCNC: 10.7 G/DL (ref 13.5–17.5)
HGB BLD-MCNC: 11.7 G/DL (ref 13.5–17.5)
INR PPP: 1.46 (ref 0.85–1.17)
LYMPHOCYTES NFR BLD AUTO: 7 % (ref 15–50)
MAGNESIUM SERPL-MCNC: 1.6 MG/DL (ref 1.8–2.4)
MCH RBC QN AUTO: 26.9 PG (ref 26–34)
MCHC RBC AUTO-ENTMCNC: 32.6 G/DL (ref 31–37)
MCV RBC: 82.4 FL (ref 80–100)
MONOCYTES NFR BLD: 14 % (ref 2–11)
NEUTROPHILS NFR BLD AUTO: 75 % (ref 40–80)
OSMOLALITY SERPL CALC.SUM OF ELEC: 269 MOSM/KG (ref 275–300)
PHOSPHATE SERPL-MCNC: 1.9 MG/DL (ref 2.5–4.9)
PLAT MORPH BLD: (no result)
PLATELET # BLD EST: (no result) 10*3/UL
PLATELET # BLD: 413 10X3/UL (ref 130–400)
PMV BLD AUTO: 9.7 FL (ref 7.4–10.4)
POTASSIUM SERPL-SCNC: 3.7 MMOL/L (ref 3.5–5.1)
PROTHROMBIN TIME: 17.1 SECONDS (ref 11.6–15)
RBC # BLD AUTO: 3.98 10X6/UL (ref 4.2–6.1)
SODIUM SERPL-SCNC: 134 MMOL/L (ref 136–145)
WBC # BLD AUTO: 21.8 10X3/UL (ref 4.8–10.8)

## 2019-10-31 NOTE — NUR
Nutrition Follow-up:
Tolerating clear liquids. Diet advanced to full liquids. No N/V. Reports hard
BM this AM.
No new wt
Labs noted: Ca 7.8, PO4 1.9, Mg 1.6
Meds noted: Micro K, KDur, Neutraphos, D5-LR @ 60
-Rec ADAT to dental soft cardiac as medically feasible.
-Offer nutrition supplements.
-RD following.

## 2019-10-31 NOTE — NUR
RECIEVED PT IN BED AAOX4 RESP SL SOB  SKIN W/D COLOR PALE PT DENIES ANY NEEEDS
OR DISCOMFORT NAD NOTED

## 2019-10-31 NOTE — NUR
IV TO LEFT AC LEAKING, IV CATH REMOVED, TIP INTACT.  PIV RESITED TO RIGHT
FOREARM, 20 GUAGE.  PT TOLERATED WELL.

## 2019-10-31 NOTE — NUR
SPOKE WITH DEEPA BOWLES FOR DR BOTELLO REPORTED H&H RESULTS AND THAT
PATIENT HAD PASSED APPROX 240  CC DARK RED BLOOD NO CLOTTS NOTED WILL
CONTINUE TO MONITOR

## 2019-11-01 VITALS — SYSTOLIC BLOOD PRESSURE: 139 MMHG | DIASTOLIC BLOOD PRESSURE: 71 MMHG

## 2019-11-01 VITALS — SYSTOLIC BLOOD PRESSURE: 135 MMHG | DIASTOLIC BLOOD PRESSURE: 74 MMHG

## 2019-11-01 VITALS — SYSTOLIC BLOOD PRESSURE: 114 MMHG | DIASTOLIC BLOOD PRESSURE: 56 MMHG

## 2019-11-01 VITALS — DIASTOLIC BLOOD PRESSURE: 65 MMHG | SYSTOLIC BLOOD PRESSURE: 139 MMHG

## 2019-11-01 VITALS — SYSTOLIC BLOOD PRESSURE: 134 MMHG | DIASTOLIC BLOOD PRESSURE: 79 MMHG

## 2019-11-01 VITALS — SYSTOLIC BLOOD PRESSURE: 136 MMHG | DIASTOLIC BLOOD PRESSURE: 66 MMHG

## 2019-11-01 VITALS — SYSTOLIC BLOOD PRESSURE: 133 MMHG | DIASTOLIC BLOOD PRESSURE: 79 MMHG

## 2019-11-01 VITALS — DIASTOLIC BLOOD PRESSURE: 64 MMHG | SYSTOLIC BLOOD PRESSURE: 114 MMHG

## 2019-11-01 VITALS — SYSTOLIC BLOOD PRESSURE: 97 MMHG | DIASTOLIC BLOOD PRESSURE: 57 MMHG

## 2019-11-01 VITALS — SYSTOLIC BLOOD PRESSURE: 112 MMHG | DIASTOLIC BLOOD PRESSURE: 59 MMHG

## 2019-11-01 VITALS — DIASTOLIC BLOOD PRESSURE: 62 MMHG | SYSTOLIC BLOOD PRESSURE: 109 MMHG

## 2019-11-01 VITALS — SYSTOLIC BLOOD PRESSURE: 115 MMHG | DIASTOLIC BLOOD PRESSURE: 58 MMHG

## 2019-11-01 VITALS — SYSTOLIC BLOOD PRESSURE: 93 MMHG | DIASTOLIC BLOOD PRESSURE: 46 MMHG

## 2019-11-01 VITALS — SYSTOLIC BLOOD PRESSURE: 108 MMHG | DIASTOLIC BLOOD PRESSURE: 91 MMHG

## 2019-11-01 VITALS — SYSTOLIC BLOOD PRESSURE: 116 MMHG | DIASTOLIC BLOOD PRESSURE: 56 MMHG

## 2019-11-01 VITALS — SYSTOLIC BLOOD PRESSURE: 101 MMHG | DIASTOLIC BLOOD PRESSURE: 95 MMHG

## 2019-11-01 VITALS — DIASTOLIC BLOOD PRESSURE: 71 MMHG | SYSTOLIC BLOOD PRESSURE: 139 MMHG

## 2019-11-01 VITALS — SYSTOLIC BLOOD PRESSURE: 109 MMHG | DIASTOLIC BLOOD PRESSURE: 86 MMHG

## 2019-11-01 VITALS — DIASTOLIC BLOOD PRESSURE: 74 MMHG | SYSTOLIC BLOOD PRESSURE: 120 MMHG

## 2019-11-01 VITALS — SYSTOLIC BLOOD PRESSURE: 135 MMHG | DIASTOLIC BLOOD PRESSURE: 47 MMHG

## 2019-11-01 VITALS — SYSTOLIC BLOOD PRESSURE: 119 MMHG | DIASTOLIC BLOOD PRESSURE: 58 MMHG

## 2019-11-01 VITALS — DIASTOLIC BLOOD PRESSURE: 66 MMHG | SYSTOLIC BLOOD PRESSURE: 111 MMHG

## 2019-11-01 VITALS — SYSTOLIC BLOOD PRESSURE: 154 MMHG | DIASTOLIC BLOOD PRESSURE: 102 MMHG

## 2019-11-01 VITALS — DIASTOLIC BLOOD PRESSURE: 73 MMHG | SYSTOLIC BLOOD PRESSURE: 130 MMHG

## 2019-11-01 VITALS — DIASTOLIC BLOOD PRESSURE: 62 MMHG | SYSTOLIC BLOOD PRESSURE: 129 MMHG

## 2019-11-01 VITALS — SYSTOLIC BLOOD PRESSURE: 86 MMHG | DIASTOLIC BLOOD PRESSURE: 63 MMHG

## 2019-11-01 VITALS — SYSTOLIC BLOOD PRESSURE: 134 MMHG | DIASTOLIC BLOOD PRESSURE: 71 MMHG

## 2019-11-01 VITALS — DIASTOLIC BLOOD PRESSURE: 105 MMHG | SYSTOLIC BLOOD PRESSURE: 140 MMHG

## 2019-11-01 VITALS — SYSTOLIC BLOOD PRESSURE: 141 MMHG | DIASTOLIC BLOOD PRESSURE: 86 MMHG

## 2019-11-01 VITALS — SYSTOLIC BLOOD PRESSURE: 102 MMHG | DIASTOLIC BLOOD PRESSURE: 54 MMHG

## 2019-11-01 VITALS — DIASTOLIC BLOOD PRESSURE: 52 MMHG | SYSTOLIC BLOOD PRESSURE: 104 MMHG

## 2019-11-01 VITALS — DIASTOLIC BLOOD PRESSURE: 53 MMHG | SYSTOLIC BLOOD PRESSURE: 109 MMHG

## 2019-11-01 VITALS — SYSTOLIC BLOOD PRESSURE: 139 MMHG | DIASTOLIC BLOOD PRESSURE: 72 MMHG

## 2019-11-01 VITALS — SYSTOLIC BLOOD PRESSURE: 110 MMHG | DIASTOLIC BLOOD PRESSURE: 48 MMHG

## 2019-11-01 VITALS — DIASTOLIC BLOOD PRESSURE: 69 MMHG | SYSTOLIC BLOOD PRESSURE: 138 MMHG

## 2019-11-01 VITALS — DIASTOLIC BLOOD PRESSURE: 62 MMHG | SYSTOLIC BLOOD PRESSURE: 132 MMHG

## 2019-11-01 VITALS — DIASTOLIC BLOOD PRESSURE: 67 MMHG | SYSTOLIC BLOOD PRESSURE: 136 MMHG

## 2019-11-01 VITALS — SYSTOLIC BLOOD PRESSURE: 130 MMHG | DIASTOLIC BLOOD PRESSURE: 75 MMHG

## 2019-11-01 VITALS — SYSTOLIC BLOOD PRESSURE: 98 MMHG | DIASTOLIC BLOOD PRESSURE: 62 MMHG

## 2019-11-01 VITALS — SYSTOLIC BLOOD PRESSURE: 118 MMHG | DIASTOLIC BLOOD PRESSURE: 61 MMHG

## 2019-11-01 VITALS — DIASTOLIC BLOOD PRESSURE: 55 MMHG | SYSTOLIC BLOOD PRESSURE: 109 MMHG

## 2019-11-01 VITALS — DIASTOLIC BLOOD PRESSURE: 72 MMHG | SYSTOLIC BLOOD PRESSURE: 128 MMHG

## 2019-11-01 VITALS — DIASTOLIC BLOOD PRESSURE: 75 MMHG | SYSTOLIC BLOOD PRESSURE: 148 MMHG

## 2019-11-01 VITALS — DIASTOLIC BLOOD PRESSURE: 67 MMHG | SYSTOLIC BLOOD PRESSURE: 121 MMHG

## 2019-11-01 VITALS — SYSTOLIC BLOOD PRESSURE: 107 MMHG | DIASTOLIC BLOOD PRESSURE: 51 MMHG

## 2019-11-01 VITALS — DIASTOLIC BLOOD PRESSURE: 66 MMHG | SYSTOLIC BLOOD PRESSURE: 132 MMHG

## 2019-11-01 VITALS — SYSTOLIC BLOOD PRESSURE: 100 MMHG | DIASTOLIC BLOOD PRESSURE: 48 MMHG

## 2019-11-01 VITALS — DIASTOLIC BLOOD PRESSURE: 42 MMHG | SYSTOLIC BLOOD PRESSURE: 84 MMHG

## 2019-11-01 VITALS — SYSTOLIC BLOOD PRESSURE: 119 MMHG | DIASTOLIC BLOOD PRESSURE: 56 MMHG

## 2019-11-01 VITALS — DIASTOLIC BLOOD PRESSURE: 63 MMHG | SYSTOLIC BLOOD PRESSURE: 132 MMHG

## 2019-11-01 VITALS — DIASTOLIC BLOOD PRESSURE: 57 MMHG | SYSTOLIC BLOOD PRESSURE: 121 MMHG

## 2019-11-01 VITALS — DIASTOLIC BLOOD PRESSURE: 59 MMHG | SYSTOLIC BLOOD PRESSURE: 116 MMHG

## 2019-11-01 VITALS — DIASTOLIC BLOOD PRESSURE: 54 MMHG | SYSTOLIC BLOOD PRESSURE: 98 MMHG

## 2019-11-01 VITALS — DIASTOLIC BLOOD PRESSURE: 65 MMHG | SYSTOLIC BLOOD PRESSURE: 135 MMHG

## 2019-11-01 VITALS — DIASTOLIC BLOOD PRESSURE: 106 MMHG | SYSTOLIC BLOOD PRESSURE: 134 MMHG

## 2019-11-01 VITALS — DIASTOLIC BLOOD PRESSURE: 53 MMHG | SYSTOLIC BLOOD PRESSURE: 130 MMHG

## 2019-11-01 VITALS — DIASTOLIC BLOOD PRESSURE: 61 MMHG | SYSTOLIC BLOOD PRESSURE: 113 MMHG

## 2019-11-01 VITALS — SYSTOLIC BLOOD PRESSURE: 135 MMHG | DIASTOLIC BLOOD PRESSURE: 48 MMHG

## 2019-11-01 VITALS — DIASTOLIC BLOOD PRESSURE: 86 MMHG | SYSTOLIC BLOOD PRESSURE: 139 MMHG

## 2019-11-01 VITALS — DIASTOLIC BLOOD PRESSURE: 60 MMHG | SYSTOLIC BLOOD PRESSURE: 103 MMHG

## 2019-11-01 VITALS — DIASTOLIC BLOOD PRESSURE: 57 MMHG | SYSTOLIC BLOOD PRESSURE: 111 MMHG

## 2019-11-01 VITALS — DIASTOLIC BLOOD PRESSURE: 47 MMHG | SYSTOLIC BLOOD PRESSURE: 110 MMHG

## 2019-11-01 VITALS — DIASTOLIC BLOOD PRESSURE: 79 MMHG | SYSTOLIC BLOOD PRESSURE: 140 MMHG

## 2019-11-01 VITALS — SYSTOLIC BLOOD PRESSURE: 96 MMHG | DIASTOLIC BLOOD PRESSURE: 48 MMHG

## 2019-11-01 VITALS — DIASTOLIC BLOOD PRESSURE: 49 MMHG | SYSTOLIC BLOOD PRESSURE: 93 MMHG

## 2019-11-01 VITALS — DIASTOLIC BLOOD PRESSURE: 67 MMHG | SYSTOLIC BLOOD PRESSURE: 150 MMHG

## 2019-11-01 VITALS — DIASTOLIC BLOOD PRESSURE: 55 MMHG | SYSTOLIC BLOOD PRESSURE: 108 MMHG

## 2019-11-01 VITALS — DIASTOLIC BLOOD PRESSURE: 54 MMHG | SYSTOLIC BLOOD PRESSURE: 114 MMHG

## 2019-11-01 VITALS — SYSTOLIC BLOOD PRESSURE: 111 MMHG | DIASTOLIC BLOOD PRESSURE: 72 MMHG

## 2019-11-01 LAB
ANION GAP SERPL CALC-SCNC: 9.9 MMOL/L (ref 8–16)
BASOPHILS NFR BLD AUTO: 0.1 % (ref 0–2)
BUN SERPL-MCNC: 21 MG/DL (ref 7–18)
CALCIUM SERPL-MCNC: 7.2 MG/DL (ref 8.5–10.1)
CHLORIDE SERPL-SCNC: 107 MMOL/L (ref 98–107)
CO2 SERPL-SCNC: 25.6 MMOL/L (ref 21–32)
CREAT SERPL-MCNC: 2 MG/DL (ref 0.6–1.3)
CRP SERPL-MCNC: 8.1 MG/DL (ref 0–0.9)
EOSINOPHIL NFR BLD: 0.5 % (ref 0–7)
ERYTHROCYTE [DISTWIDTH] IN BLOOD BY AUTOMATED COUNT: 17.1 % (ref 11.5–14.5)
GLUCOSE SERPL-MCNC: 195 MG/DL (ref 74–106)
HCT VFR BLD CALC: 21.4 % (ref 42–54)
HCT VFR BLD CALC: 27.6 % (ref 42–54)
HCT VFR BLD CALC: 31.7 % (ref 42–54)
HGB BLD-MCNC: 10.2 G/DL (ref 13.5–17.5)
HGB BLD-MCNC: 6.9 G/DL (ref 13.5–17.5)
HGB BLD-MCNC: 8.9 G/DL (ref 13.5–17.5)
IMM GRANULOCYTES NFR BLD: 0.5 % (ref 0–5)
LYMPHOCYTES NFR BLD AUTO: 6.7 % (ref 15–50)
MAGNESIUM SERPL-MCNC: 1.6 MG/DL (ref 1.8–2.4)
MCH RBC QN AUTO: 26.7 PG (ref 26–34)
MCHC RBC AUTO-ENTMCNC: 32.2 G/DL (ref 31–37)
MCV RBC: 82.9 FL (ref 80–100)
MONOCYTES NFR BLD: 7.3 % (ref 2–11)
NEUTROPHILS NFR BLD AUTO: 84.9 % (ref 40–80)
OSMOLALITY SERPL CALC.SUM OF ELEC: 285 MOSM/KG (ref 275–300)
PHOSPHATE SERPL-MCNC: 2.1 MG/DL (ref 2.5–4.9)
PLATELET # BLD: 407 10X3/UL (ref 130–400)
PMV BLD AUTO: 9.2 FL (ref 7.4–10.4)
POTASSIUM SERPL-SCNC: 3.5 MMOL/L (ref 3.5–5.1)
RBC # BLD AUTO: 2.58 10X6/UL (ref 4.2–6.1)
SODIUM SERPL-SCNC: 139 MMOL/L (ref 136–145)
WBC # BLD AUTO: 16.8 10X3/UL (ref 4.8–10.8)

## 2019-11-01 PROCEDURE — 02HV33Z INSERTION OF INFUSION DEVICE INTO SUPERIOR VENA CAVA, PERCUTANEOUS APPROACH: ICD-10-PCS | Performed by: FAMILY MEDICINE

## 2019-11-01 PROCEDURE — 3E04317 INTRODUCTION OF OTHER THROMBOLYTIC INTO CENTRAL VEIN, PERCUTANEOUS APPROACH: ICD-10-PCS | Performed by: RADIOLOGY

## 2019-11-01 PROCEDURE — B548ZZA ULTRASONOGRAPHY OF SUPERIOR VENA CAVA, GUIDANCE: ICD-10-PCS | Performed by: FAMILY MEDICINE

## 2019-11-01 PROCEDURE — B415ZZZ FLUOROSCOPY OF INFERIOR MESENTERIC ARTERY: ICD-10-PCS | Performed by: RADIOLOGY

## 2019-11-01 NOTE — NUR
1ST UNIT OF BLOOD FINISHED TRANSFUSING. 2ND UNIT assisted DONE TRANSFUSING. PT
TAKEN BACK TO IR FOR PROCEDURE. FAMILY IN WAITING AREA. PT ON LEVOPHED AT
10MCG. FLAGYL INFUSING. VSS.

## 2019-11-01 NOTE — NUR
PT BACK FROM IR. TOLD IN REPORT THAT THERE IS A CATH TO THE TASHIA WITH
VASOPRESSIN INFUSING AT 30ML/HR (0.2 UNITS/HR). LEVOPHED WEANED DOWN TO 7MCG
IN IR. 3RD UNIT OF BLOOD TRANSFUSING. /86 WITH MAP . HR 71. O2 SAT
AT 92%.

## 2019-11-01 NOTE — NUR
REPORT RECEIVED. PT ALERT AND ORIENTED. GI BLEED. CLEANED PT UP WITH NIGHT
SHIFT NURSE. BLOOD ORDERED. IV TO LEFT AC AND RIGHT FOREARM. NO SKIN ISSUES AT
THIS TIME. VSS. ON LEVOPHED. WILL CONTINUE TO MONITOR.

## 2019-11-01 NOTE — NUR
Nutrition follow-up:
NPO
Active GI bleed
Levophed started
Wt: 138#
Will need nutrition support started soon.
RDN following.

## 2019-11-01 NOTE — NUR
PT GIVEN PO MEDS WITHOUT PROBLEMS, PT'S TEMP INCREASING TO 95.1 AX, PT STATES
THAT HE IS FEELING SOME WARMER, CAPREFILL WITHIN NORMAL RANGE

## 2019-11-01 NOTE — NUR
PT STATES THAT HE IS STARTING TO FEEL HOT, AX TEMP 97.8, AIRFLOW WARMER TURNED
OFF, WILL MONITOR FOR CHANGES

## 2019-11-01 NOTE — NUR
DR ROTHMAN AND DR DUARTE SPOKE REGARDING PT'S STATUS. DR DUARTE IS NOW GOING
TO SPEAK WITH IR ABOUT INTERVENTION.

## 2019-11-01 NOTE — NUR
SPOKE WITH DR DUARTE ABOUT PT CONDITION. HAVE PAGED DR GILBERT TO SPEAK WITH DR DUARTE TO DETERMINE WHETHER PT SHOULD GO TO IR VS SURGERY.

## 2019-11-01 NOTE — NUR
PT REASSESSMENT COMPLETED AT THIS TIME, PT WAS NOTED TO VERY COLD TO THE
TOUCH, PT'S TEMP WAS NOTED TO BE 94.1 AX, PT WAS IMMMEDIATLY PLACED UNDER
AIRFLOW WARMER AND WARM BLANKETS PROVIDED, PT WAS ALSO NOTED TO BE VERY PALE
AND CAP REFILL TIME WAS GREATER THAN 5, PEDAL PULSES WHERE VERY FAINT PER
DOPPLER AND EQUAL BILAT.

## 2019-11-01 NOTE — NUR
PT RESTING QUIETLY. TITRATING LEVOPHED PER IR ORDERS. VASOPRESSIN STILL
INFUSING INTO RIGHT GROIN. 0.2 UNITS/HR. VSS. WILL CONTINUE TO MONITOR.
3RD UNIT OF BLOOD DONE TRANSFUSING.

## 2019-11-01 NOTE — NUR
PT CURRENTLY IN IR. FAMILY NOTIFIED TO WAIT IN RADIOLOGY WAITING AREA SO DR GRIFFIN CAN SPEAK WITH THEM AFTER THE PROCEDURE.

## 2019-11-01 NOTE — NUR
DR DUARTE ROUNDED ON PT. UPDATED PT. NO BOWEL EPISODES SINCE RETURN FROM IR
PROCEDURE. PT HAS VOIDED SINCE RETURN. VSS. WILL CONTINUE TO MONITOR.

## 2019-11-02 VITALS — DIASTOLIC BLOOD PRESSURE: 78 MMHG | SYSTOLIC BLOOD PRESSURE: 124 MMHG

## 2019-11-02 VITALS — DIASTOLIC BLOOD PRESSURE: 82 MMHG | SYSTOLIC BLOOD PRESSURE: 115 MMHG

## 2019-11-02 VITALS — DIASTOLIC BLOOD PRESSURE: 60 MMHG | SYSTOLIC BLOOD PRESSURE: 129 MMHG

## 2019-11-02 VITALS — SYSTOLIC BLOOD PRESSURE: 129 MMHG | DIASTOLIC BLOOD PRESSURE: 75 MMHG

## 2019-11-02 VITALS — SYSTOLIC BLOOD PRESSURE: 125 MMHG | DIASTOLIC BLOOD PRESSURE: 69 MMHG

## 2019-11-02 VITALS — SYSTOLIC BLOOD PRESSURE: 123 MMHG | DIASTOLIC BLOOD PRESSURE: 69 MMHG

## 2019-11-02 VITALS — SYSTOLIC BLOOD PRESSURE: 129 MMHG | DIASTOLIC BLOOD PRESSURE: 60 MMHG

## 2019-11-02 VITALS — SYSTOLIC BLOOD PRESSURE: 125 MMHG | DIASTOLIC BLOOD PRESSURE: 67 MMHG

## 2019-11-02 VITALS — DIASTOLIC BLOOD PRESSURE: 65 MMHG | SYSTOLIC BLOOD PRESSURE: 125 MMHG

## 2019-11-02 VITALS — SYSTOLIC BLOOD PRESSURE: 127 MMHG | DIASTOLIC BLOOD PRESSURE: 69 MMHG

## 2019-11-02 VITALS — DIASTOLIC BLOOD PRESSURE: 71 MMHG | SYSTOLIC BLOOD PRESSURE: 130 MMHG

## 2019-11-02 VITALS — SYSTOLIC BLOOD PRESSURE: 129 MMHG | DIASTOLIC BLOOD PRESSURE: 72 MMHG

## 2019-11-02 VITALS — DIASTOLIC BLOOD PRESSURE: 74 MMHG | SYSTOLIC BLOOD PRESSURE: 123 MMHG

## 2019-11-02 VITALS — SYSTOLIC BLOOD PRESSURE: 130 MMHG | DIASTOLIC BLOOD PRESSURE: 75 MMHG

## 2019-11-02 VITALS — SYSTOLIC BLOOD PRESSURE: 115 MMHG | DIASTOLIC BLOOD PRESSURE: 68 MMHG

## 2019-11-02 VITALS — DIASTOLIC BLOOD PRESSURE: 65 MMHG | SYSTOLIC BLOOD PRESSURE: 122 MMHG

## 2019-11-02 VITALS — SYSTOLIC BLOOD PRESSURE: 128 MMHG | DIASTOLIC BLOOD PRESSURE: 90 MMHG

## 2019-11-02 VITALS — DIASTOLIC BLOOD PRESSURE: 65 MMHG | SYSTOLIC BLOOD PRESSURE: 128 MMHG

## 2019-11-02 VITALS — SYSTOLIC BLOOD PRESSURE: 133 MMHG | DIASTOLIC BLOOD PRESSURE: 66 MMHG

## 2019-11-02 VITALS — DIASTOLIC BLOOD PRESSURE: 66 MMHG | SYSTOLIC BLOOD PRESSURE: 127 MMHG

## 2019-11-02 VITALS — DIASTOLIC BLOOD PRESSURE: 61 MMHG | SYSTOLIC BLOOD PRESSURE: 121 MMHG

## 2019-11-02 VITALS — DIASTOLIC BLOOD PRESSURE: 75 MMHG | SYSTOLIC BLOOD PRESSURE: 130 MMHG

## 2019-11-02 VITALS — DIASTOLIC BLOOD PRESSURE: 79 MMHG | SYSTOLIC BLOOD PRESSURE: 124 MMHG

## 2019-11-02 VITALS — DIASTOLIC BLOOD PRESSURE: 69 MMHG | SYSTOLIC BLOOD PRESSURE: 128 MMHG

## 2019-11-02 LAB
ANION GAP SERPL CALC-SCNC: 10.5 MMOL/L (ref 8–16)
ANION GAP SERPL CALC-SCNC: 13 MMOL/L (ref 8–16)
BASOPHILS NFR BLD AUTO: 0.1 % (ref 0–2)
BUN SERPL-MCNC: 12 MG/DL (ref 7–18)
BUN SERPL-MCNC: 12 MG/DL (ref 7–18)
CALCIUM SERPL-MCNC: 7.6 MG/DL (ref 8.5–10.1)
CALCIUM SERPL-MCNC: 7.7 MG/DL (ref 8.5–10.1)
CHLORIDE SERPL-SCNC: 111 MMOL/L (ref 98–107)
CHLORIDE SERPL-SCNC: 111 MMOL/L (ref 98–107)
CO2 SERPL-SCNC: 22.8 MMOL/L (ref 21–32)
CO2 SERPL-SCNC: 23.4 MMOL/L (ref 21–32)
CREAT SERPL-MCNC: 1.1 MG/DL (ref 0.6–1.3)
CREAT SERPL-MCNC: 1.1 MG/DL (ref 0.6–1.3)
EOSINOPHIL NFR BLD: 0.5 % (ref 0–7)
ERYTHROCYTE [DISTWIDTH] IN BLOOD BY AUTOMATED COUNT: 17 % (ref 11.5–14.5)
GLUCOSE SERPL-MCNC: 143 MG/DL (ref 74–106)
GLUCOSE SERPL-MCNC: 149 MG/DL (ref 74–106)
HCT VFR BLD CALC: 28.5 % (ref 42–54)
HCT VFR BLD CALC: 30.2 % (ref 42–54)
HCT VFR BLD CALC: 30.3 % (ref 42–54)
HGB BLD-MCNC: 9.4 G/DL (ref 13.5–17.5)
HGB BLD-MCNC: 9.7 G/DL (ref 13.5–17.5)
HGB BLD-MCNC: 9.8 G/DL (ref 13.5–17.5)
IMM GRANULOCYTES NFR BLD: 0.3 % (ref 0–5)
LYMPHOCYTES NFR BLD AUTO: 7.5 % (ref 15–50)
MAGNESIUM SERPL-MCNC: 1.8 MG/DL (ref 1.8–2.4)
MCH RBC QN AUTO: 27.1 PG (ref 26–34)
MCHC RBC AUTO-ENTMCNC: 33 G/DL (ref 31–37)
MCV RBC: 82.1 FL (ref 80–100)
MONOCYTES NFR BLD: 7.6 % (ref 2–11)
NEUTROPHILS NFR BLD AUTO: 84 % (ref 40–80)
OSMOLALITY SERPL CALC.SUM OF ELEC: 282 MOSM/KG (ref 275–300)
OSMOLALITY SERPL CALC.SUM OF ELEC: 287 MOSM/KG (ref 275–300)
PHOSPHATE SERPL-MCNC: 2.2 MG/DL (ref 2.5–4.9)
PLATELET # BLD: 329 10X3/UL (ref 130–400)
PMV BLD AUTO: 9 FL (ref 7.4–10.4)
POTASSIUM SERPL-SCNC: 3.8 MMOL/L (ref 3.5–5.1)
POTASSIUM SERPL-SCNC: 3.9 MMOL/L (ref 3.5–5.1)
RBC # BLD AUTO: 3.47 10X6/UL (ref 4.2–6.1)
SODIUM SERPL-SCNC: 141 MMOL/L (ref 136–145)
SODIUM SERPL-SCNC: 143 MMOL/L (ref 136–145)
WBC # BLD AUTO: 13.2 10X3/UL (ref 4.8–10.8)

## 2019-11-02 NOTE — NUR
REASSESSMENT COMPLETE SEE FLOW SHEET FOR FURTHER, NO ACUTE CHANGES NOTED FROM
PRIOR ASSESSMENT, VSS, NSR ON CM, REPOSITIONED FOR COMFORT, WILL CONTINUE TO
MONITOR

## 2019-11-02 NOTE — NUR
PT NOTED TO HAVE EPISODE OF INCREASED RESPIRATIONS TO UPPER 20S, OXYGEN
SATURATION DESAT TO 75% WITH A GOOD WAVEFORM ON 1L NC. OXYGEN INCREAED UP TO
4L VIA NC. OXYGEN SATURATION NOW AT 92%. RESPIRATIONS NOW IN LOWER 20S. PT
DENIES TROUBLE BREATHING. DR BOTELLO PAGECATE TO NOTIFY. WILL CONTINUE TO
OBSERVE.

## 2019-11-02 NOTE — NUR
WHILE TURNING/REPOSITIONING PT NOTED LINENS WET, PT STATED HE SPILLED URINAL,
TOLD PT TO NOTIFY WHEN HE SPILLS SO WE CAN REPLACE LINENS AND PT STATES
UNDERSTANDING, TOTAL LINEN CHANGE AND PERICARE, SMALL BROWN SMEAR ON SHEETS
NOTED

## 2019-11-02 NOTE — NUR
REPORT RECEIVED AT BEDSIDE, SHIFT ASSESSMENT COMPLETE PER FLOW SHEET, PT
AAOx4, C/O LLQ ABDOMINAL DISCOMFORT WHEN PALPATED, NC @4L/MIN, VSS, NSR ON CM,
IR SHEETH SECURED IN RT GROIN, DRSG C/D/I, NO ACUTE S/S OF DISTRESS NOTED,
REPOSITIONED FOR COMFORT, RLE STRAIGHT PER ORDERS, BLE PULSES WEAK BUT ABLE TO
DOPPLER, WILL CONTINUE TO MONITOR

## 2019-11-02 NOTE — NUR
PT REPOSITIONED Q2H THROUGHOUT SHIFT, MOUTH SWABS AT BEDSIDE FOR DRY MOUTH, PT
DENIES ALL NEEDS, CALL LIGHT AND PHONE WITHIN REACH

## 2019-11-02 NOTE — NUR
0700 PT RECIEVED ALERT AND ORIENTED O2 2L NC R PICC WITH NS 125ML/HR, LFA PIV
SL, R GROIN SHEATH SITE CDI, SOFT, NO SIGNS OF BLEEDING, VASOPRESSIN INFUSING
PER ORDERS, USES URINAL, PULSES DOPPLERED DENIES ALL NEEDS CALL LIGHT WITHIN
REACH
0845 SPOKE WITH RENAL APN AND OKAYED AM MEDS, GIVEN WITH SIP OF WATER, PTS
DAUGHTER JUAN CALLED FOR UPDATE

## 2019-11-03 VITALS — SYSTOLIC BLOOD PRESSURE: 138 MMHG | DIASTOLIC BLOOD PRESSURE: 74 MMHG

## 2019-11-03 VITALS — SYSTOLIC BLOOD PRESSURE: 108 MMHG | DIASTOLIC BLOOD PRESSURE: 64 MMHG

## 2019-11-03 VITALS — SYSTOLIC BLOOD PRESSURE: 113 MMHG | DIASTOLIC BLOOD PRESSURE: 72 MMHG

## 2019-11-03 VITALS — SYSTOLIC BLOOD PRESSURE: 111 MMHG | DIASTOLIC BLOOD PRESSURE: 55 MMHG

## 2019-11-03 VITALS — SYSTOLIC BLOOD PRESSURE: 123 MMHG | DIASTOLIC BLOOD PRESSURE: 76 MMHG

## 2019-11-03 VITALS — DIASTOLIC BLOOD PRESSURE: 86 MMHG | SYSTOLIC BLOOD PRESSURE: 125 MMHG

## 2019-11-03 VITALS — SYSTOLIC BLOOD PRESSURE: 112 MMHG | DIASTOLIC BLOOD PRESSURE: 81 MMHG

## 2019-11-03 VITALS — SYSTOLIC BLOOD PRESSURE: 104 MMHG | DIASTOLIC BLOOD PRESSURE: 700 MMHG

## 2019-11-03 VITALS — DIASTOLIC BLOOD PRESSURE: 72 MMHG | SYSTOLIC BLOOD PRESSURE: 132 MMHG

## 2019-11-03 VITALS — DIASTOLIC BLOOD PRESSURE: 58 MMHG | SYSTOLIC BLOOD PRESSURE: 109 MMHG

## 2019-11-03 VITALS — SYSTOLIC BLOOD PRESSURE: 101 MMHG | DIASTOLIC BLOOD PRESSURE: 58 MMHG

## 2019-11-03 VITALS — SYSTOLIC BLOOD PRESSURE: 121 MMHG | DIASTOLIC BLOOD PRESSURE: 88 MMHG

## 2019-11-03 VITALS — DIASTOLIC BLOOD PRESSURE: 81 MMHG | SYSTOLIC BLOOD PRESSURE: 129 MMHG

## 2019-11-03 VITALS — SYSTOLIC BLOOD PRESSURE: 112 MMHG | DIASTOLIC BLOOD PRESSURE: 73 MMHG

## 2019-11-03 VITALS — SYSTOLIC BLOOD PRESSURE: 114 MMHG | DIASTOLIC BLOOD PRESSURE: 94 MMHG

## 2019-11-03 VITALS — DIASTOLIC BLOOD PRESSURE: 62 MMHG | SYSTOLIC BLOOD PRESSURE: 98 MMHG

## 2019-11-03 VITALS — SYSTOLIC BLOOD PRESSURE: 130 MMHG | DIASTOLIC BLOOD PRESSURE: 80 MMHG

## 2019-11-03 VITALS — SYSTOLIC BLOOD PRESSURE: 129 MMHG | DIASTOLIC BLOOD PRESSURE: 79 MMHG

## 2019-11-03 VITALS — DIASTOLIC BLOOD PRESSURE: 72 MMHG | SYSTOLIC BLOOD PRESSURE: 135 MMHG

## 2019-11-03 VITALS — SYSTOLIC BLOOD PRESSURE: 107 MMHG | DIASTOLIC BLOOD PRESSURE: 55 MMHG

## 2019-11-03 VITALS — DIASTOLIC BLOOD PRESSURE: 76 MMHG | SYSTOLIC BLOOD PRESSURE: 101 MMHG

## 2019-11-03 VITALS — DIASTOLIC BLOOD PRESSURE: 78 MMHG | SYSTOLIC BLOOD PRESSURE: 128 MMHG

## 2019-11-03 VITALS — DIASTOLIC BLOOD PRESSURE: 84 MMHG | SYSTOLIC BLOOD PRESSURE: 129 MMHG

## 2019-11-03 LAB
ANION GAP SERPL CALC-SCNC: 11.5 MMOL/L (ref 8–16)
BASOPHILS NFR BLD AUTO: 0.1 % (ref 0–2)
BUN SERPL-MCNC: 9 MG/DL (ref 7–18)
CALCIUM SERPL-MCNC: 7.8 MG/DL (ref 8.5–10.1)
CHLORIDE SERPL-SCNC: 113 MMOL/L (ref 98–107)
CO2 SERPL-SCNC: 23.3 MMOL/L (ref 21–32)
CREAT SERPL-MCNC: 0.9 MG/DL (ref 0.6–1.3)
EOSINOPHIL NFR BLD: 0.1 % (ref 0–7)
ERYTHROCYTE [DISTWIDTH] IN BLOOD BY AUTOMATED COUNT: 17.5 % (ref 11.5–14.5)
GLUCOSE SERPL-MCNC: 166 MG/DL (ref 74–106)
HCT VFR BLD CALC: 31.6 % (ref 42–54)
HGB BLD-MCNC: 10.2 G/DL (ref 13.5–17.5)
IMM GRANULOCYTES NFR BLD: 0.4 % (ref 0–5)
INR PPP: 1.21 (ref 0.85–1.17)
LYMPHOCYTES NFR BLD AUTO: 4.2 % (ref 15–50)
MCH RBC QN AUTO: 26.9 PG (ref 26–34)
MCHC RBC AUTO-ENTMCNC: 32.3 G/DL (ref 31–37)
MCV RBC: 83.4 FL (ref 80–100)
MONOCYTES NFR BLD: 8 % (ref 2–11)
NEUTROPHILS NFR BLD AUTO: 87.2 % (ref 40–80)
OSMOLALITY SERPL CALC.SUM OF ELEC: 289 MOSM/KG (ref 275–300)
PLATELET # BLD: 331 10X3/UL (ref 130–400)
PMV BLD AUTO: 9.7 FL (ref 7.4–10.4)
POTASSIUM SERPL-SCNC: 3.8 MMOL/L (ref 3.5–5.1)
PROTHROMBIN TIME: 14.8 SECONDS (ref 11.6–15)
RBC # BLD AUTO: 3.79 10X6/UL (ref 4.2–6.1)
SODIUM SERPL-SCNC: 144 MMOL/L (ref 136–145)
WBC # BLD AUTO: 15.8 10X3/UL (ref 4.8–10.8)

## 2019-11-03 PROCEDURE — B41FZZZ FLUOROSCOPY OF RIGHT LOWER EXTREMITY ARTERIES: ICD-10-PCS | Performed by: RADIOLOGY

## 2019-11-03 NOTE — NUR
CHG BATH AND LINEN CHANGE COMPLETE, YELLOW GOWN PLACED ON PT, RT GROIN SHEETH
DRSG SATURATED WITH URINE FROM PT MISSING URINAL, COMPLETE DRSG CHANGE USING
STERILE PROCEDURE, PT TOLLERATED WELL, NO ACUTE S/S OF DISTRESS NOTED, VSS,
NSR ON CM, WILL CONTINUE TO MONITOR

## 2019-11-03 NOTE — NUR
REPORT RECEIVED AT BEDSIDE, SHIFT ASSESSMENT COMPLETE PER FLOW SHEET, PT AAOx4
DENIES PAIN OR NEEDS AT THIS TIME, RT FEMORAL PRIOR SHEETH SITE C/D/I SOFT TO
PALPATION, NO ACUTE S/S OF DISTRESS, VSS, NSR ON CM, CALL LIGHT IN REACH, WILL
CONTINUE TO MONITOR Complex Repair And M Plasty Text: The defect edges were debeveled with a #15 scalpel blade.  The primary defect was closed partially with a complex linear closure.  Given the location of the remaining defect, shape of the defect and the proximity to free margins an M plasty was deemed most appropriate for complete closure of the defect.  Using a sterile surgical marker, an appropriate advancement flap was drawn incorporating the defect and placing the expected incisions within the relaxed skin tension lines where possible.    The area thus outlined was incised deep to adipose tissue with a #15 scalpel blade.  The skin margins were undermined to an appropriate distance in all directions utilizing iris scissors.

## 2019-11-03 NOTE — NUR
REPORT RECIEVED, SHIFT ASSESSMENT COMPLETE, PT IS CONFUSED LYING IN BED, ON 4L
NC WITH 94% O2 SAT. VSS, CALL LIGHT IN REACH

## 2019-11-03 NOTE — NUR
REASSESSMENT COMPLETE SEE FLOW SHEET FOR FURTHER, VSS, NSR ON CM, NO ACUTE
CHANGES NOTED FROM PRIOR ASSESSMENT, REPOSITIONED FOR COMFORT, WILL CONTINUE
TO MONITOR

## 2019-11-03 NOTE — NUR
REASSESSMENT COMPLETE, NO ACUTE CHANGES FROM PRIOR ASSESSMENT, PT AAOx4 DENIES
PAIN OR NEEDS, I/S COMPLETED, VSS, REPOSITIONED IN BED FOR COMFORT, CUP ICE
WATER GIVEN PER REQUEST, WILL CONTINUE TO MONITOR

## 2019-11-04 VITALS — SYSTOLIC BLOOD PRESSURE: 95 MMHG | DIASTOLIC BLOOD PRESSURE: 51 MMHG

## 2019-11-04 VITALS — DIASTOLIC BLOOD PRESSURE: 66 MMHG | SYSTOLIC BLOOD PRESSURE: 106 MMHG

## 2019-11-04 VITALS — DIASTOLIC BLOOD PRESSURE: 52 MMHG | SYSTOLIC BLOOD PRESSURE: 98 MMHG

## 2019-11-04 VITALS — SYSTOLIC BLOOD PRESSURE: 101 MMHG | DIASTOLIC BLOOD PRESSURE: 60 MMHG

## 2019-11-04 VITALS — SYSTOLIC BLOOD PRESSURE: 99 MMHG | DIASTOLIC BLOOD PRESSURE: 52 MMHG

## 2019-11-04 VITALS — SYSTOLIC BLOOD PRESSURE: 99 MMHG | DIASTOLIC BLOOD PRESSURE: 53 MMHG

## 2019-11-04 VITALS — DIASTOLIC BLOOD PRESSURE: 81 MMHG | SYSTOLIC BLOOD PRESSURE: 112 MMHG

## 2019-11-04 VITALS — DIASTOLIC BLOOD PRESSURE: 62 MMHG | SYSTOLIC BLOOD PRESSURE: 96 MMHG

## 2019-11-04 VITALS — SYSTOLIC BLOOD PRESSURE: 92 MMHG | DIASTOLIC BLOOD PRESSURE: 53 MMHG

## 2019-11-04 VITALS — SYSTOLIC BLOOD PRESSURE: 104 MMHG | DIASTOLIC BLOOD PRESSURE: 66 MMHG

## 2019-11-04 VITALS — DIASTOLIC BLOOD PRESSURE: 60 MMHG | SYSTOLIC BLOOD PRESSURE: 129 MMHG

## 2019-11-04 VITALS — SYSTOLIC BLOOD PRESSURE: 102 MMHG | DIASTOLIC BLOOD PRESSURE: 63 MMHG

## 2019-11-04 LAB
ALBUMIN SERPL-MCNC: 1.8 G/DL (ref 3.4–5)
ALP SERPL-CCNC: 90 U/L (ref 46–116)
ALT SERPL-CCNC: 38 U/L (ref 10–68)
ANION GAP SERPL CALC-SCNC: 9.4 MMOL/L (ref 8–16)
BASOPHILS NFR BLD AUTO: 0 % (ref 0–2)
BILIRUB SERPL-MCNC: 1.22 MG/DL (ref 0.2–1.3)
BUN SERPL-MCNC: 10 MG/DL (ref 7–18)
CALCIUM SERPL-MCNC: 7.7 MG/DL (ref 8.5–10.1)
CHLORIDE SERPL-SCNC: 110 MMOL/L (ref 98–107)
CO2 SERPL-SCNC: 26 MMOL/L (ref 21–32)
CREAT SERPL-MCNC: 1.1 MG/DL (ref 0.6–1.3)
EOSINOPHIL NFR BLD: 0.1 % (ref 0–7)
ERYTHROCYTE [DISTWIDTH] IN BLOOD BY AUTOMATED COUNT: 17.3 % (ref 11.5–14.5)
GLOBULIN SER-MCNC: 3.3 G/L
GLUCOSE SERPL-MCNC: 100 MG/DL (ref 74–106)
HCT VFR BLD CALC: 30 % (ref 42–54)
HCT VFR BLD CALC: 31.5 % (ref 42–54)
HGB BLD-MCNC: 10.1 G/DL (ref 13.5–17.5)
HGB BLD-MCNC: 10.2 G/DL (ref 13.5–17.5)
IMM GRANULOCYTES NFR BLD: 0.5 % (ref 0–5)
LYMPHOCYTES NFR BLD AUTO: 4.1 % (ref 15–50)
MCH RBC QN AUTO: 26.6 PG (ref 26–34)
MCHC RBC AUTO-ENTMCNC: 32.4 G/DL (ref 31–37)
MCV RBC: 82.2 FL (ref 80–100)
MONOCYTES NFR BLD: 7.6 % (ref 2–11)
NEUTROPHILS NFR BLD AUTO: 87.7 % (ref 40–80)
OSMOLALITY SERPL CALC.SUM OF ELEC: 281 MOSM/KG (ref 275–300)
PLATELET # BLD: 278 10X3/UL (ref 130–400)
PMV BLD AUTO: 9.6 FL (ref 7.4–10.4)
POTASSIUM SERPL-SCNC: 3.4 MMOL/L (ref 3.5–5.1)
PROT SERPL-MCNC: 5.1 G/DL (ref 6.4–8.2)
RBC # BLD AUTO: 3.83 10X6/UL (ref 4.2–6.1)
SODIUM SERPL-SCNC: 142 MMOL/L (ref 136–145)
WBC # BLD AUTO: 18 10X3/UL (ref 4.8–10.8)

## 2019-11-04 NOTE — NUR
REPORT RECEIVED, SHIFT ASSESSMENT COMPLETED PER FLOW SHEET, SEE FOR DETAILS.
DENIES PAIN OR NEEDS. CALL LIGHT WITHIN REACH.
1940 CALL LIGHT ANSWERED, WATER PROVIDED, NO DYSPHAGIA, DENIES OTHER NEEDS,
CALL LIGHT WITHIN REACH.
2057 O2 SENSOR ALARMING, READING 88%, INSTRUCTED PATIENT TO COUGH, NO SPUTUM,
INSTRUCTED PATIENT TO TAKE DEEP BREATHS, O2 IMPROVED TO 93%, PATIENT REMAINS
ON 10 L HIGH FLOW NC.
2101 PAGED ELIZABETH BOWLES WHO IS ON CALL FOR DR. HOLM.
2108 ELIZABETH BOWLES RETURNED CALL, VITAL SIGNS, MEDICATIONS, AND LAB
RESULTS REVIEWED, INFORMED HIM THAT PATIENT IS UNABLE TO BE WEANED DOWN ON HIS
OXYGEN DUE TO DESAT EPISODE, PER HIS ORDERS HOLD TRANSFER ORDERS DUE TO
HYPOXIA, OK TO GIVE SCHEDULED MEDICATIONS AND LAB ORDERS FOR H&H AND
POTASSIUM.
2201 PATIENT INCONTINENT OF URINE, PATIENT CLEANED AND COMPLETE BED LINEN
CHANGE AND NEW GOWN PROVIDED, DENIES OTHER NEEDS. CALL LIGHT WITHIN REACH.
2301 REASSESSMENT COMPLETED PER FLOW SHEET, SEE FOR DETAILS.
0100 RESTING IN BED, DENIES NEEDS, CALL LIGHT WITHIN REACH.
 
 
DUE TO HYPOXIA.
2200 LAB RESULTS REVIEWED, NO TREATMENT FOR POTASSIUM PER PROTOCOL.

## 2019-11-04 NOTE — NUR
CHG BATH WITH COMPLETE LINEN CHANGE, YELLOW GOWN PLACED ON PT, HOB ELEVATED,
REPOSITIONED FOR COMFORT,
BILATERAL FA PIV'S D/C'D, CATH TIPS INTACT x2, 2X2 GAUZE WITH TEGADERM DRSG
PLACED ON SITES

## 2019-11-04 NOTE — NUR
Nutrition follow-up:
Diet: Clear liquids; pt up to chair
GI bleeding has stopped
labs reviewed
Wt: 143#
Recommend advancing diet to at least full liquids today.
RDN following.

## 2019-11-04 NOTE — NUR
REASSESSMENT COMPLETE, NO ACUTE CHANGES SINCE PRIOR ASSESSMENT, VSS,
REPOSITIONED FOR COMFORT, I/S COMPLETED, CALL LIGHT IN REACH

## 2019-11-05 VITALS — DIASTOLIC BLOOD PRESSURE: 51 MMHG | SYSTOLIC BLOOD PRESSURE: 99 MMHG

## 2019-11-05 VITALS — DIASTOLIC BLOOD PRESSURE: 47 MMHG | SYSTOLIC BLOOD PRESSURE: 87 MMHG

## 2019-11-05 VITALS — DIASTOLIC BLOOD PRESSURE: 46 MMHG | SYSTOLIC BLOOD PRESSURE: 92 MMHG

## 2019-11-05 VITALS — SYSTOLIC BLOOD PRESSURE: 99 MMHG | DIASTOLIC BLOOD PRESSURE: 53 MMHG

## 2019-11-05 VITALS — DIASTOLIC BLOOD PRESSURE: 47 MMHG | SYSTOLIC BLOOD PRESSURE: 91 MMHG

## 2019-11-05 VITALS — DIASTOLIC BLOOD PRESSURE: 49 MMHG | SYSTOLIC BLOOD PRESSURE: 86 MMHG

## 2019-11-05 VITALS — DIASTOLIC BLOOD PRESSURE: 48 MMHG | SYSTOLIC BLOOD PRESSURE: 90 MMHG

## 2019-11-05 VITALS — SYSTOLIC BLOOD PRESSURE: 104 MMHG | DIASTOLIC BLOOD PRESSURE: 56 MMHG

## 2019-11-05 VITALS — DIASTOLIC BLOOD PRESSURE: 51 MMHG | SYSTOLIC BLOOD PRESSURE: 96 MMHG

## 2019-11-05 VITALS — SYSTOLIC BLOOD PRESSURE: 94 MMHG | DIASTOLIC BLOOD PRESSURE: 49 MMHG

## 2019-11-05 VITALS — SYSTOLIC BLOOD PRESSURE: 92 MMHG | DIASTOLIC BLOOD PRESSURE: 48 MMHG

## 2019-11-05 VITALS — DIASTOLIC BLOOD PRESSURE: 49 MMHG | SYSTOLIC BLOOD PRESSURE: 96 MMHG

## 2019-11-05 VITALS — DIASTOLIC BLOOD PRESSURE: 44 MMHG | SYSTOLIC BLOOD PRESSURE: 87 MMHG

## 2019-11-05 VITALS — SYSTOLIC BLOOD PRESSURE: 91 MMHG | DIASTOLIC BLOOD PRESSURE: 42 MMHG

## 2019-11-05 VITALS — SYSTOLIC BLOOD PRESSURE: 94 MMHG | DIASTOLIC BLOOD PRESSURE: 38 MMHG

## 2019-11-05 VITALS — SYSTOLIC BLOOD PRESSURE: 83 MMHG | DIASTOLIC BLOOD PRESSURE: 45 MMHG

## 2019-11-05 VITALS — SYSTOLIC BLOOD PRESSURE: 87 MMHG | DIASTOLIC BLOOD PRESSURE: 42 MMHG

## 2019-11-05 VITALS — SYSTOLIC BLOOD PRESSURE: 91 MMHG | DIASTOLIC BLOOD PRESSURE: 52 MMHG

## 2019-11-05 VITALS — SYSTOLIC BLOOD PRESSURE: 101 MMHG | DIASTOLIC BLOOD PRESSURE: 48 MMHG

## 2019-11-05 VITALS — SYSTOLIC BLOOD PRESSURE: 97 MMHG | DIASTOLIC BLOOD PRESSURE: 47 MMHG

## 2019-11-05 VITALS — DIASTOLIC BLOOD PRESSURE: 54 MMHG | SYSTOLIC BLOOD PRESSURE: 99 MMHG

## 2019-11-05 VITALS — SYSTOLIC BLOOD PRESSURE: 85 MMHG | DIASTOLIC BLOOD PRESSURE: 45 MMHG

## 2019-11-05 VITALS — DIASTOLIC BLOOD PRESSURE: 46 MMHG | SYSTOLIC BLOOD PRESSURE: 97 MMHG

## 2019-11-05 LAB
ALBUMIN SERPL-MCNC: 1.6 G/DL (ref 3.4–5)
ALP SERPL-CCNC: 80 U/L (ref 46–116)
ALT SERPL-CCNC: 25 U/L (ref 10–68)
ANION GAP SERPL CALC-SCNC: 7.8 MMOL/L (ref 8–16)
BASOPHILS NFR BLD AUTO: 0 % (ref 0–2)
BILIRUB SERPL-MCNC: 0.97 MG/DL (ref 0.2–1.3)
BUN SERPL-MCNC: 13 MG/DL (ref 7–18)
CALCIUM SERPL-MCNC: 7.5 MG/DL (ref 8.5–10.1)
CHLORIDE SERPL-SCNC: 106 MMOL/L (ref 98–107)
CO2 SERPL-SCNC: 27.7 MMOL/L (ref 21–32)
CREAT SERPL-MCNC: 1.3 MG/DL (ref 0.6–1.3)
EOSINOPHIL NFR BLD: 0.4 % (ref 0–7)
ERYTHROCYTE [DISTWIDTH] IN BLOOD BY AUTOMATED COUNT: 17.4 % (ref 11.5–14.5)
GLOBULIN SER-MCNC: 3.1 G/L
GLUCOSE SERPL-MCNC: 80 MG/DL (ref 74–106)
HCT VFR BLD CALC: 29.1 % (ref 42–54)
HGB BLD-MCNC: 9.5 G/DL (ref 13.5–17.5)
IMM GRANULOCYTES NFR BLD: 0.6 % (ref 0–5)
LYMPHOCYTES NFR BLD AUTO: 4.6 % (ref 15–50)
MCH RBC QN AUTO: 26.8 PG (ref 26–34)
MCHC RBC AUTO-ENTMCNC: 32.6 G/DL (ref 31–37)
MCV RBC: 82.2 FL (ref 80–100)
MONOCYTES NFR BLD: 6.1 % (ref 2–11)
NEUTROPHILS NFR BLD AUTO: 88.3 % (ref 40–80)
OSMOLALITY SERPL CALC.SUM OF ELEC: 274 MOSM/KG (ref 275–300)
PLATELET # BLD: 212 10X3/UL (ref 130–400)
PMV BLD AUTO: 9.6 FL (ref 7.4–10.4)
POTASSIUM SERPL-SCNC: 3.5 MMOL/L (ref 3.5–5.1)
PROT SERPL-MCNC: 4.7 G/DL (ref 6.4–8.2)
RBC # BLD AUTO: 3.54 10X6/UL (ref 4.2–6.1)
SODIUM SERPL-SCNC: 138 MMOL/L (ref 136–145)
WBC # BLD AUTO: 16.4 10X3/UL (ref 4.8–10.8)

## 2019-11-05 NOTE — NUR
REASSESSMENT COMPLETED PER FLOW SHEET, SEE FOR DETAILS.
0500 CHG BATH GIVEN. COMPLETE BED LINEN CHANGE PROVIDED. DENIES NEEDS. CALL
LIGHT WITHIN REACH.

## 2019-11-05 NOTE — NUR
REMOVED COBAN FROM LOWER EXTREMITY ALL WOUNDS WITHOUT REDNESS OR EDEMA. BOTTOM
INCISION HAS 4X4 ATTACHED AND UNABLE TO REMOVE. REDRESSED WITH 4X4 WITH TAPE.
SHIFT ASSESSMENT COMPLETE AT 1910.

## 2019-11-06 VITALS — SYSTOLIC BLOOD PRESSURE: 96 MMHG | DIASTOLIC BLOOD PRESSURE: 49 MMHG

## 2019-11-06 VITALS — SYSTOLIC BLOOD PRESSURE: 111 MMHG | DIASTOLIC BLOOD PRESSURE: 53 MMHG

## 2019-11-06 VITALS — SYSTOLIC BLOOD PRESSURE: 97 MMHG | DIASTOLIC BLOOD PRESSURE: 45 MMHG

## 2019-11-06 VITALS — DIASTOLIC BLOOD PRESSURE: 52 MMHG | SYSTOLIC BLOOD PRESSURE: 102 MMHG

## 2019-11-06 VITALS — SYSTOLIC BLOOD PRESSURE: 81 MMHG | DIASTOLIC BLOOD PRESSURE: 45 MMHG

## 2019-11-06 VITALS — SYSTOLIC BLOOD PRESSURE: 98 MMHG | DIASTOLIC BLOOD PRESSURE: 48 MMHG

## 2019-11-06 VITALS — SYSTOLIC BLOOD PRESSURE: 102 MMHG | DIASTOLIC BLOOD PRESSURE: 50 MMHG

## 2019-11-06 VITALS — DIASTOLIC BLOOD PRESSURE: 41 MMHG | SYSTOLIC BLOOD PRESSURE: 81 MMHG

## 2019-11-06 VITALS — DIASTOLIC BLOOD PRESSURE: 44 MMHG | SYSTOLIC BLOOD PRESSURE: 79 MMHG

## 2019-11-06 VITALS — DIASTOLIC BLOOD PRESSURE: 48 MMHG | SYSTOLIC BLOOD PRESSURE: 99 MMHG

## 2019-11-06 VITALS — SYSTOLIC BLOOD PRESSURE: 101 MMHG | DIASTOLIC BLOOD PRESSURE: 49 MMHG

## 2019-11-06 VITALS — DIASTOLIC BLOOD PRESSURE: 74 MMHG | SYSTOLIC BLOOD PRESSURE: 96 MMHG

## 2019-11-06 VITALS — SYSTOLIC BLOOD PRESSURE: 101 MMHG | DIASTOLIC BLOOD PRESSURE: 55 MMHG

## 2019-11-06 VITALS — SYSTOLIC BLOOD PRESSURE: 81 MMHG | DIASTOLIC BLOOD PRESSURE: 42 MMHG

## 2019-11-06 VITALS — DIASTOLIC BLOOD PRESSURE: 58 MMHG | SYSTOLIC BLOOD PRESSURE: 106 MMHG

## 2019-11-06 VITALS — DIASTOLIC BLOOD PRESSURE: 56 MMHG | SYSTOLIC BLOOD PRESSURE: 113 MMHG

## 2019-11-06 VITALS — DIASTOLIC BLOOD PRESSURE: 54 MMHG | SYSTOLIC BLOOD PRESSURE: 103 MMHG

## 2019-11-06 VITALS — DIASTOLIC BLOOD PRESSURE: 52 MMHG | SYSTOLIC BLOOD PRESSURE: 98 MMHG

## 2019-11-06 VITALS — SYSTOLIC BLOOD PRESSURE: 97 MMHG | DIASTOLIC BLOOD PRESSURE: 48 MMHG

## 2019-11-06 VITALS — DIASTOLIC BLOOD PRESSURE: 55 MMHG | SYSTOLIC BLOOD PRESSURE: 107 MMHG

## 2019-11-06 VITALS — DIASTOLIC BLOOD PRESSURE: 47 MMHG | SYSTOLIC BLOOD PRESSURE: 107 MMHG

## 2019-11-06 VITALS — SYSTOLIC BLOOD PRESSURE: 87 MMHG | DIASTOLIC BLOOD PRESSURE: 44 MMHG

## 2019-11-06 VITALS — DIASTOLIC BLOOD PRESSURE: 56 MMHG | SYSTOLIC BLOOD PRESSURE: 105 MMHG

## 2019-11-06 VITALS — SYSTOLIC BLOOD PRESSURE: 98 MMHG | DIASTOLIC BLOOD PRESSURE: 50 MMHG

## 2019-11-06 LAB
ALBUMIN SERPL-MCNC: 1.6 G/DL (ref 3.4–5)
ALP SERPL-CCNC: 86 U/L (ref 46–116)
ALT SERPL-CCNC: 23 U/L (ref 10–68)
ANION GAP SERPL CALC-SCNC: 8 MMOL/L (ref 8–16)
APTT BLD: 39.7 SECONDS (ref 22.8–39.4)
BASOPHILS NFR BLD AUTO: 0.1 % (ref 0–2)
BILIRUB SERPL-MCNC: 0.89 MG/DL (ref 0.2–1.3)
BUN SERPL-MCNC: 15 MG/DL (ref 7–18)
CALCIUM SERPL-MCNC: 7.5 MG/DL (ref 8.5–10.1)
CHLORIDE SERPL-SCNC: 107 MMOL/L (ref 98–107)
CO2 SERPL-SCNC: 27.5 MMOL/L (ref 21–32)
CREAT SERPL-MCNC: 1.2 MG/DL (ref 0.6–1.3)
EOSINOPHIL NFR BLD: 1.1 % (ref 0–7)
ERYTHROCYTE [DISTWIDTH] IN BLOOD BY AUTOMATED COUNT: 18 % (ref 11.5–14.5)
GLOBULIN SER-MCNC: 3.4 G/L
GLUCOSE SERPL-MCNC: 87 MG/DL (ref 74–106)
HCT VFR BLD CALC: 29.2 % (ref 42–54)
HCT VFR BLD CALC: 30.3 % (ref 42–54)
HGB BLD-MCNC: 9.6 G/DL (ref 13.5–17.5)
HGB BLD-MCNC: 9.8 G/DL (ref 13.5–17.5)
IMM GRANULOCYTES NFR BLD: 0.9 % (ref 0–5)
INR PPP: 1.28 (ref 0.85–1.17)
LYMPHOCYTES NFR BLD AUTO: 4.8 % (ref 15–50)
MCH RBC QN AUTO: 26.6 PG (ref 26–34)
MCHC RBC AUTO-ENTMCNC: 32.3 G/DL (ref 31–37)
MCV RBC: 82.3 FL (ref 80–100)
MONOCYTES NFR BLD: 6.2 % (ref 2–11)
NEUTROPHILS NFR BLD AUTO: 86.9 % (ref 40–80)
OSMOLALITY SERPL CALC.SUM OF ELEC: 277 MOSM/KG (ref 275–300)
PLATELET # BLD: 212 10X3/UL (ref 130–400)
PMV BLD AUTO: 10 FL (ref 7.4–10.4)
POTASSIUM SERPL-SCNC: 3.5 MMOL/L (ref 3.5–5.1)
PROT SERPL-MCNC: 5 G/DL (ref 6.4–8.2)
PROTHROMBIN TIME: 15.4 SECONDS (ref 11.6–15)
RBC # BLD AUTO: 3.68 10X6/UL (ref 4.2–6.1)
SODIUM SERPL-SCNC: 139 MMOL/L (ref 136–145)
WBC # BLD AUTO: 17 10X3/UL (ref 4.8–10.8)

## 2019-11-06 NOTE — EC
PATIENT:BARRY TRUONG           DATE OF SERVICE: 10/28/19
SEX: M                                  MEDICAL RECORD: [id number]
DATE OF BIRTH: [birth year]                        LOCATION:[place] [address]
AGE OF PATIENT: 74                             ADMISSION DATE: 10/28/19
 
REFERRING PHYSICIAN:                               
 
INTERPRETING PHYSICIAN: RE HEREDIA MD             
 
 
 
                             ECHOCARDIOGRAM REPORT
  ECHO CHARGES 5               ECHO LIMITED                  Date: 11/03/19
 
 
 
CLINICAL DIAGNOSIS: ASSESS EF                     
 
                         ECHOCARDIOGRAPHIC MEASUREMENTS
      (adult normal given)
   AC root (d.<3.7cm) 3.8  cm   LV Septum d (<1.2 cm> 1.2  cm
      Valve Excursion 1.6  cm     LV Septum (systole) 1.4  cm
Left Atria (s.<4.0cm>      cm          LVPW d(<1.2cm) 1.6  cm
        RV (d.<2.3cm) 4.0  cm           LVPW (sytole) 1.7  cm
  LV diastole(<5.6CM) 6.3  cm       MV E-F(>70mm/sec)      cm
           LV systole 5.4  cm           LVOT Diameter 1.6  cm
       MV exc.(>10mm) 1.9  cm
Est.ejection fraction (50-75%)     %
 
   DOPPLER:
     LVIT      cm/sec A      cm/sec E       cm/sec
       LA      cm/sec      RVSP 57   mmHg
     LVOT 147  cm/sec   AOP1/2T      m/s
  Asc. Ao 205  cm/sec
     RVOT 72   cm/sec
       RA      cm/sec
         cm/sec
 AV Gradient Peak 16.80mmHg  AV Mean 8.61 mmHg  AV Area 1.5  cm
 MV Gradient Peak      mmHg  MV Mean      mmHg  MV Area      cm
   COMMENTS:                                              
 
 
 Cardiac Sonographer: Rebecca CABA              
      Cardiologist: 1          Dr. Heredia                
             TAPE# PACS           
                                       Pericardial Effusion Y                        
 
 
DATE OF SERVICE:  
 
FINDINGS:
1.  Left ventricular chamber size is dilated.  Left ventricular systolic
function is markedly depressed.  Overall ejection fraction in the 15% to 20%
range.
2.  Left atrium, right atrium, and right ventricular chamber sizes are dilated
giving 4-chamber dilatation.
4.  Valvular structures have normal structure and motion.
5.  Doppler interrogation reveals mild aortic insufficiency, mild-to-moderate
 
 
 
ECHOCARDIOGRAM REPORT                          [id number]    BARRY TRUONG   
 
 
mitral regurgitation, moderate-to-severe tricuspid regurgitation, no other
valvular insufficiency or stenosis.  Pulmonary systolic pressure is estimated at
57 mmHg.
6.  Large pleural effusion is present, but no significant pericardial effusion
is present.
 
TRANSINT:[id number] Voice Confirmation ID: [id number] DOCUMENT ID: [id number]
                                           
                                           RE HEREDIA MD             
 
 
 
Electronically Signed by RE HEREDIA on 11/06/19 at 1408
 
 
 
 
 
 
 
 
 
 
 
 
 
 
 
 
 
 
 
 
 
 
 
 
 
 
 
 
 
 
 
 
 
CC:                                                             [id number]
DICTATION DATE: 11/04/19 1529     :     11/05/19 0038      ADM IN  
                                                                              
[place] [id number] [place] [id number]

## 2019-11-06 NOTE — NUR
Nutrition Follow-up:
Advanced to clear liquids this AM; reports tolerating with slight nausea.
Cystoscopy 11/5.
Wt: 141# (was 143# on 11/4)
Last BM: 11/4 per chart
Labs noted: Ca 7.5, Alb 1.6
Meds noted: Lasix, Micro K
-If unable to advance past clear liquids within 24-48 hours, rec initiate
Procalamine.
-RD following.

## 2019-11-06 NOTE — NUR
DR. HOLM CALLED TO VERIFY HEPARIN GTT TO PREVIOUS REPORTED GI BLEED. DR. HOLM VERIFIED ORDERS TO GIVE HEPARIN GTT.

## 2019-11-06 NOTE — NUR
BLOOD SENT TO LAB FROM PICC LINE; LINE FLUSHED PER PROTOCOL.
PATIENT STATES PAIN IS GETTING BETTER AT THIS TIME. CALL LIGHT WITHIN REACH.

## 2019-11-06 NOTE — NUR
PATIENT CLEANED DUE TO INCONTINENCE OF STOOL, LINENS CHANGED, AND PATIENT
REPOSITIONED. CALL LIGHT WIHTIN REACH, BED IN LOW POSITION.

## 2019-11-06 NOTE — NUR
PATIENT C/O OF LOWER ABDOMINAL PAIN. MORPHINE 1MG GIVEN DUE TO SBP 97 MAP
(65). PATIENT STATES LOWER ABDOMEN IS THROBBING. PATIENT DENIES NEEDING TO
URINATE THAT HE JUST DID.

## 2019-11-06 NOTE — NUR
CONDOM CATH CAME OFF AND PT IS INCONTINENT OF URINE. PT BATHED AND BED LINEN
CHANGED. NEW CONDOM CATH APPLIED.

## 2019-11-07 VITALS — SYSTOLIC BLOOD PRESSURE: 82 MMHG | DIASTOLIC BLOOD PRESSURE: 40 MMHG

## 2019-11-07 VITALS — SYSTOLIC BLOOD PRESSURE: 97 MMHG | DIASTOLIC BLOOD PRESSURE: 54 MMHG

## 2019-11-07 VITALS — SYSTOLIC BLOOD PRESSURE: 109 MMHG | DIASTOLIC BLOOD PRESSURE: 55 MMHG

## 2019-11-07 VITALS — DIASTOLIC BLOOD PRESSURE: 47 MMHG | SYSTOLIC BLOOD PRESSURE: 110 MMHG

## 2019-11-07 VITALS — DIASTOLIC BLOOD PRESSURE: 52 MMHG | SYSTOLIC BLOOD PRESSURE: 105 MMHG

## 2019-11-07 VITALS — DIASTOLIC BLOOD PRESSURE: 43 MMHG | SYSTOLIC BLOOD PRESSURE: 106 MMHG

## 2019-11-07 VITALS — SYSTOLIC BLOOD PRESSURE: 101 MMHG | DIASTOLIC BLOOD PRESSURE: 55 MMHG

## 2019-11-07 VITALS — SYSTOLIC BLOOD PRESSURE: 85 MMHG | DIASTOLIC BLOOD PRESSURE: 48 MMHG

## 2019-11-07 VITALS — DIASTOLIC BLOOD PRESSURE: 38 MMHG | SYSTOLIC BLOOD PRESSURE: 87 MMHG

## 2019-11-07 VITALS — DIASTOLIC BLOOD PRESSURE: 46 MMHG | SYSTOLIC BLOOD PRESSURE: 91 MMHG

## 2019-11-07 VITALS — SYSTOLIC BLOOD PRESSURE: 85 MMHG | DIASTOLIC BLOOD PRESSURE: 44 MMHG

## 2019-11-07 VITALS — DIASTOLIC BLOOD PRESSURE: 47 MMHG | SYSTOLIC BLOOD PRESSURE: 84 MMHG

## 2019-11-07 VITALS — DIASTOLIC BLOOD PRESSURE: 57 MMHG | SYSTOLIC BLOOD PRESSURE: 98 MMHG

## 2019-11-07 VITALS — DIASTOLIC BLOOD PRESSURE: 52 MMHG | SYSTOLIC BLOOD PRESSURE: 93 MMHG

## 2019-11-07 VITALS — SYSTOLIC BLOOD PRESSURE: 79 MMHG | DIASTOLIC BLOOD PRESSURE: 39 MMHG

## 2019-11-07 VITALS — SYSTOLIC BLOOD PRESSURE: 104 MMHG | DIASTOLIC BLOOD PRESSURE: 54 MMHG

## 2019-11-07 VITALS — DIASTOLIC BLOOD PRESSURE: 44 MMHG | SYSTOLIC BLOOD PRESSURE: 95 MMHG

## 2019-11-07 VITALS — DIASTOLIC BLOOD PRESSURE: 49 MMHG | SYSTOLIC BLOOD PRESSURE: 98 MMHG

## 2019-11-07 VITALS — DIASTOLIC BLOOD PRESSURE: 47 MMHG | SYSTOLIC BLOOD PRESSURE: 95 MMHG

## 2019-11-07 VITALS — SYSTOLIC BLOOD PRESSURE: 85 MMHG | DIASTOLIC BLOOD PRESSURE: 47 MMHG

## 2019-11-07 VITALS — SYSTOLIC BLOOD PRESSURE: 96 MMHG | DIASTOLIC BLOOD PRESSURE: 50 MMHG

## 2019-11-07 VITALS — DIASTOLIC BLOOD PRESSURE: 44 MMHG | SYSTOLIC BLOOD PRESSURE: 86 MMHG

## 2019-11-07 LAB
APTT BLD: 58.9 SECONDS (ref 22.8–39.4)
BASOPHILS NFR BLD AUTO: 0 % (ref 0–2)
EOSINOPHIL NFR BLD: 1.2 % (ref 0–7)
ERYTHROCYTE [DISTWIDTH] IN BLOOD BY AUTOMATED COUNT: 18.1 % (ref 11.5–14.5)
HCT VFR BLD CALC: 27.3 % (ref 42–54)
HGB BLD-MCNC: 8.9 G/DL (ref 13.5–17.5)
IMM GRANULOCYTES NFR BLD: 1 % (ref 0–5)
INR PPP: 1.28 (ref 0.85–1.17)
LYMPHOCYTES NFR BLD AUTO: 3.8 % (ref 15–50)
MCH RBC QN AUTO: 26.6 PG (ref 26–34)
MCHC RBC AUTO-ENTMCNC: 32.6 G/DL (ref 31–37)
MCV RBC: 81.5 FL (ref 80–100)
MONOCYTES NFR BLD: 5.9 % (ref 2–11)
NEUTROPHILS NFR BLD AUTO: 88.1 % (ref 40–80)
PLATELET # BLD: 203 10X3/UL (ref 130–400)
PMV BLD AUTO: 10.1 FL (ref 7.4–10.4)
PROTHROMBIN TIME: 15.4 SECONDS (ref 11.6–15)
RBC # BLD AUTO: 3.35 10X6/UL (ref 4.2–6.1)
WBC # BLD AUTO: 15.5 10X3/UL (ref 4.8–10.8)

## 2019-11-07 NOTE — NUR
REASSESSMENT COMPLETE SEE FLOW SHEET FOR FURTHER, NO ACUTE CHANGE FROM PRIOR
ASSESSMENT, I/S AND FLUTTER COMPLETED, TCDB, VSS, NSR ON CM, REPOSITIONED FOR
COMFORT, WILL CONTINUE TO MONITOR

## 2019-11-07 NOTE — NUR
PT INITIATED CALL LIGHT, REQUESTED ASSIST TO USE URINAL, 100ML KEIRY URINE
COLLECTED IN URINAL, VOID NOTED ON BED PRIOR TO URINAL USE, PARTIAL LINEN
CHANGE AND BATH COMPLETED, REPOSIONED FOR COMFORT, ELEVATED HOB AND
EXTREMITIES, VSS, FLUTTER AND I/S COMPLETED 500-750ML x10, CALL LIGHT IN
REACH, BED ALARM ON, WILL CONTINUE TO MONITOR

## 2019-11-07 NOTE — NUR
UP IN BED AWAKE AT THIS TIME NO ACUTE DISTRESS NOTED. VSS. POTASSIUM LEVEL
NOTED 3.5 THIS AM, POTASSIUM REPLACED PER ELECTROLYTE PROTOCOL. PT DENIES ANY
NEEDS. CALL LIGHT IN REACH. HEPARIN GTT TITRATED TO ORDER; SEE FLOWSHEET FOR
FURTHER INFORMATION. WILL CONTINUE PLAN OF CARE.

## 2019-11-07 NOTE — NUR
REPORT RECEIVED, SHIFT ASSESSMENT COMPLETE PER FLOW SHEET, PT AWAKE AND ALERT
SITTING UP IN BED, DENIES PAIN OR NEEDS AT THIS TIME, RIGHT UPPER ARM PICC
LINE DRASG C/D/I, SCD AND BRANDON HOSE BLE, HOB ELEVATED, CALL LIGHT IN REACH,
REPOSITIONED FOR COMFORT, VSS, NSR ON CM, WILL CONTINUE TO MONITOR

## 2019-11-07 NOTE — NUR
UP IN CHAIR AT THIS TIME WITH ASSIST FROM PHYSICAL THERAPY. NO ACUTE DISTRESS
NOTED. VSS. CALL LIGHT IN REACH. WILL CONTINUE PLAN OF CARE.

## 2019-11-07 NOTE — NUR
LARGE INCONTINENT STOOL NOTED AT THIS TIME, LIQUID BROWN. STOOL SPECIMEN SENT
OFF TO CHECK FOR CDIFF. BED BATH GIVEN. TOTAL LINEN CHANGE PROVIDED. VSS. WILL
CONTINUE PLAN OF CARE.

## 2019-11-07 NOTE — NUR
NOTED PT REQUESTS TO GO BACK TO BED. PHYSICAL THERAPY NOTIFIED, STATED WILL BE
HERE SHORTLY. VSS. WILL CONTINUE PLAN OF CARE.

## 2019-11-07 NOTE — NUR
PT ASSISTED BACK TO BED WITH ASSIST FROM PHYSICAL THERAPY. NOTED PT HAD
INCONTINENT BOWEL MOVEMENT, LIQUID BROWN. PT NOTED TO HAVE TOUCHED STOOL AND
HAD SMEARED IT ON HIS HANDS, BEDSIDE CHAIR WHICH HE WAS SITTING IN, IV TUBING,
MEDICAL MONITORING EQUIPMENT, AND FLOOR. TOTAL LINEN CHANGE PROVIDED, CHG BATH
PROVIDED, PICC LINE DRESSING CHANGED PER PROTOCOL USING STERILE PROCEDURE, ALL
MONITORING EQUIPMENT CHANGED OUT, IV TUBING AND IV FLUIDS CHANGED OUT, DONTE
CARE PROVIDED. ALSO PTT NOTED AT 58.9, HEPARIN GTT TITRATED TO ORDER, SEE
FLOWSHEET. NEXT RECHECK TO BE SCHEDULED FOR 2000. WILL CONTINUE TO OBSERVE.

## 2019-11-07 NOTE — NUR
UP IN BED AT THIS TIME AWAKE. NO ACUTE DISTRESS NOTED. VSS. CALL LIGHT IN
REACH. WILL CONTINUE PLAN OF CARE.

## 2019-11-08 VITALS — SYSTOLIC BLOOD PRESSURE: 91 MMHG | DIASTOLIC BLOOD PRESSURE: 53 MMHG

## 2019-11-08 VITALS — SYSTOLIC BLOOD PRESSURE: 91 MMHG | DIASTOLIC BLOOD PRESSURE: 59 MMHG

## 2019-11-08 VITALS — SYSTOLIC BLOOD PRESSURE: 99 MMHG | DIASTOLIC BLOOD PRESSURE: 54 MMHG

## 2019-11-08 VITALS — DIASTOLIC BLOOD PRESSURE: 45 MMHG | SYSTOLIC BLOOD PRESSURE: 86 MMHG

## 2019-11-08 VITALS — DIASTOLIC BLOOD PRESSURE: 67 MMHG | SYSTOLIC BLOOD PRESSURE: 100 MMHG

## 2019-11-08 VITALS — DIASTOLIC BLOOD PRESSURE: 48 MMHG | SYSTOLIC BLOOD PRESSURE: 92 MMHG

## 2019-11-08 VITALS — DIASTOLIC BLOOD PRESSURE: 58 MMHG | SYSTOLIC BLOOD PRESSURE: 97 MMHG

## 2019-11-08 VITALS — SYSTOLIC BLOOD PRESSURE: 88 MMHG | DIASTOLIC BLOOD PRESSURE: 54 MMHG

## 2019-11-08 VITALS — DIASTOLIC BLOOD PRESSURE: 48 MMHG | SYSTOLIC BLOOD PRESSURE: 90 MMHG

## 2019-11-08 VITALS — DIASTOLIC BLOOD PRESSURE: 51 MMHG | SYSTOLIC BLOOD PRESSURE: 84 MMHG

## 2019-11-08 VITALS — SYSTOLIC BLOOD PRESSURE: 100 MMHG | DIASTOLIC BLOOD PRESSURE: 63 MMHG

## 2019-11-08 VITALS — SYSTOLIC BLOOD PRESSURE: 93 MMHG | DIASTOLIC BLOOD PRESSURE: 50 MMHG

## 2019-11-08 VITALS — SYSTOLIC BLOOD PRESSURE: 91 MMHG | DIASTOLIC BLOOD PRESSURE: 48 MMHG

## 2019-11-08 VITALS — SYSTOLIC BLOOD PRESSURE: 94 MMHG | DIASTOLIC BLOOD PRESSURE: 52 MMHG

## 2019-11-08 VITALS — DIASTOLIC BLOOD PRESSURE: 44 MMHG | SYSTOLIC BLOOD PRESSURE: 94 MMHG

## 2019-11-08 VITALS — SYSTOLIC BLOOD PRESSURE: 85 MMHG | DIASTOLIC BLOOD PRESSURE: 45 MMHG

## 2019-11-08 VITALS — DIASTOLIC BLOOD PRESSURE: 58 MMHG | SYSTOLIC BLOOD PRESSURE: 104 MMHG

## 2019-11-08 VITALS — DIASTOLIC BLOOD PRESSURE: 51 MMHG | SYSTOLIC BLOOD PRESSURE: 97 MMHG

## 2019-11-08 VITALS — SYSTOLIC BLOOD PRESSURE: 102 MMHG | DIASTOLIC BLOOD PRESSURE: 50 MMHG

## 2019-11-08 VITALS — SYSTOLIC BLOOD PRESSURE: 89 MMHG | DIASTOLIC BLOOD PRESSURE: 44 MMHG

## 2019-11-08 VITALS — DIASTOLIC BLOOD PRESSURE: 46 MMHG | SYSTOLIC BLOOD PRESSURE: 88 MMHG

## 2019-11-08 VITALS — DIASTOLIC BLOOD PRESSURE: 38 MMHG | SYSTOLIC BLOOD PRESSURE: 56 MMHG

## 2019-11-08 VITALS — DIASTOLIC BLOOD PRESSURE: 40 MMHG | SYSTOLIC BLOOD PRESSURE: 67 MMHG

## 2019-11-08 VITALS — SYSTOLIC BLOOD PRESSURE: 69 MMHG | DIASTOLIC BLOOD PRESSURE: 45 MMHG

## 2019-11-08 VITALS — DIASTOLIC BLOOD PRESSURE: 48 MMHG | SYSTOLIC BLOOD PRESSURE: 97 MMHG

## 2019-11-08 VITALS — SYSTOLIC BLOOD PRESSURE: 96 MMHG | DIASTOLIC BLOOD PRESSURE: 41 MMHG

## 2019-11-08 VITALS — SYSTOLIC BLOOD PRESSURE: 84 MMHG | DIASTOLIC BLOOD PRESSURE: 50 MMHG

## 2019-11-08 VITALS — SYSTOLIC BLOOD PRESSURE: 106 MMHG | DIASTOLIC BLOOD PRESSURE: 53 MMHG

## 2019-11-08 LAB
BASOPHILS NFR BLD AUTO: 0.1 % (ref 0–2)
EOSINOPHIL NFR BLD: 0.8 % (ref 0–7)
ERYTHROCYTE [DISTWIDTH] IN BLOOD BY AUTOMATED COUNT: 18.3 % (ref 11.5–14.5)
HCT VFR BLD CALC: 28.3 % (ref 42–54)
HGB BLD-MCNC: 9 G/DL (ref 13.5–17.5)
IMM GRANULOCYTES NFR BLD: 0.6 % (ref 0–5)
LYMPHOCYTES NFR BLD AUTO: 4.2 % (ref 15–50)
MCH RBC QN AUTO: 26.1 PG (ref 26–34)
MCHC RBC AUTO-ENTMCNC: 31.8 G/DL (ref 31–37)
MCV RBC: 82 FL (ref 80–100)
MONOCYTES NFR BLD: 4.8 % (ref 2–11)
NEUTROPHILS NFR BLD AUTO: 89.5 % (ref 40–80)
PLATELET # BLD: 191 10X3/UL (ref 130–400)
PMV BLD AUTO: 9.5 FL (ref 7.4–10.4)
RBC # BLD AUTO: 3.45 10X6/UL (ref 4.2–6.1)
WBC # BLD AUTO: 19.9 10X3/UL (ref 4.8–10.8)

## 2019-11-08 NOTE — NUR
pt inc of urine. bath and linens changed. pt/ptt drawn and sent to lab.
breakfast tray served. pt eating with out difficulty. pt getting uo oob.

## 2019-11-08 NOTE — NUR
Nutrition Follow-up:
Advanced to regular diet yesterday. Pt reports tolerating breakfast this AM.
No c/o N/V. Noted pt with liquid stools yesterday. CDT negative.
Diet: Regular, Ensure TID
Wt: 144.4#
Labs reviewed
Meds noted: Micro K
-Continue current diet as tolerated.
-RD following.

## 2019-11-08 NOTE — NUR
PT'S DAUGHTER STATES THAT PT IS IN PAIN AND IS ASKING FOR HIS NORCO BE STARTED
BACK. CALLED DR HOLM AND REC'D OK FOR NORCO TO BE STARTED BACK.

## 2019-11-08 NOTE — NUR
REASSESSMENT COMPLETED, PT CONFUSED AT THIS TIME, THINKING HE SEES OBJECTS ON
TABLE THAT ARE NOT THERE, DENIES PAIN, PT INFORMED OF THE CURRENT TIME AND
ENCOURAGED TO TRY AND GET SOME SLEEP, VERBALIZED UNDERSTANDING, WILL CONT TO
MONITOR CLOSELY FOR CHANGES.

## 2019-11-08 NOTE — NUR
EVENING MEDS GIVEN, PT REQUESTING PAIN PILL FOR ABDOMEN, STATES " NOT TYLENOL
THAT IS NOT STRONG ENOUGH", NORCO 5 GIVEN PO AT THIS TIME, PT REPOSITIONED
ONTO RIGHT SIDE SUPPORTED WITH PILLOWS, WILL MONITOR CLOSELY FOR CHANGES.

## 2019-11-08 NOTE — NUR
REASSESSMENT COMPLETE SEE FLOW SHEET FOR FURTHER, NO ACUTE CHANGE FROM PRIOR
ASSESSMENT, PT AWAKE AND ALERT, SPO2% DECREASES WHEN PT REMOVES NC, I/S AND
FLUTTER COMPLETED, LINEN AND GOWN CHANGED, HOB ELEVATED, REPOSITIONED FOR
COMFORT, DENIES OTHER NEEDS AT THIS TIME, WILL CONTINUE TO MONITOR

## 2019-11-08 NOTE — NUR
REPORT REC'D AND CARE ASSUMED, REC'D PT LYING IN BED, EYES CLOSED, EASILY
AWAKENS, ORIENTED X 3, O2 @ 9L VIA HIGH FLOW NC, RIGHT UPPER ARM SWOLLEN AND
RED, PICC LINE IN PLACE, DRSG CDI, SEE FLOWSHEET FOR FLUIDS, CM- SR, BP 96/52,
PT COMPLAINS OF INTERMITTENT ABDOMINAL PAIN ACROSS LOWER ABDOMEN, PT DENIES
FURTHER NEEDS, BED IN LOW POSITION, CALL LIGHT IN REACH.

## 2019-11-08 NOTE — NUR
PT INCONTINENT OF SMALL LIQUID GREEN STOOL, COMPLETE BATH AND LINEN CHANGE
PROVIDED, PT REPOSITIONED UP IN BED AND ONTO BACK, PT CONFUSED AND LOOKING FOR
A WHITE PHONE, EXPLAINED TO PT THERE WAS NO PHONE IN THE BED WITH HIM, ACTED
LIKE THE CALL LIGHT IS WHAT HE WAS LOOKING FOR, ASSISTED PT TO TURN OFF TV, BP
STABLE, CM SR .

## 2019-11-08 NOTE — NUR
PT INCONTINENT OF LARGE LOOSE GREEN STOOL, PARTIAL BATH AND COMPLETE LINEN
CHANGE PROVIDED, MEAGAN'S BUTT PASTE APPLIED TO REDDENED AREA ON BUTTOCKS,
PT REPOSITIONED UP IN BED FOR COMFORT, CALL LIGHT IN REACH.

## 2019-11-09 VITALS — DIASTOLIC BLOOD PRESSURE: 53 MMHG | SYSTOLIC BLOOD PRESSURE: 100 MMHG

## 2019-11-09 VITALS — SYSTOLIC BLOOD PRESSURE: 87 MMHG | DIASTOLIC BLOOD PRESSURE: 49 MMHG

## 2019-11-09 VITALS — DIASTOLIC BLOOD PRESSURE: 43 MMHG | SYSTOLIC BLOOD PRESSURE: 80 MMHG

## 2019-11-09 VITALS — SYSTOLIC BLOOD PRESSURE: 105 MMHG | DIASTOLIC BLOOD PRESSURE: 66 MMHG

## 2019-11-09 VITALS — DIASTOLIC BLOOD PRESSURE: 49 MMHG | SYSTOLIC BLOOD PRESSURE: 103 MMHG

## 2019-11-09 VITALS — SYSTOLIC BLOOD PRESSURE: 108 MMHG | DIASTOLIC BLOOD PRESSURE: 47 MMHG

## 2019-11-09 VITALS — SYSTOLIC BLOOD PRESSURE: 109 MMHG | DIASTOLIC BLOOD PRESSURE: 47 MMHG

## 2019-11-09 VITALS — DIASTOLIC BLOOD PRESSURE: 47 MMHG | SYSTOLIC BLOOD PRESSURE: 99 MMHG

## 2019-11-09 VITALS — SYSTOLIC BLOOD PRESSURE: 112 MMHG | DIASTOLIC BLOOD PRESSURE: 60 MMHG

## 2019-11-09 VITALS — SYSTOLIC BLOOD PRESSURE: 100 MMHG | DIASTOLIC BLOOD PRESSURE: 52 MMHG

## 2019-11-09 VITALS — DIASTOLIC BLOOD PRESSURE: 56 MMHG | SYSTOLIC BLOOD PRESSURE: 113 MMHG

## 2019-11-09 VITALS — DIASTOLIC BLOOD PRESSURE: 59 MMHG | SYSTOLIC BLOOD PRESSURE: 125 MMHG

## 2019-11-09 VITALS — SYSTOLIC BLOOD PRESSURE: 120 MMHG | DIASTOLIC BLOOD PRESSURE: 58 MMHG

## 2019-11-09 VITALS — SYSTOLIC BLOOD PRESSURE: 123 MMHG | DIASTOLIC BLOOD PRESSURE: 48 MMHG

## 2019-11-09 VITALS — DIASTOLIC BLOOD PRESSURE: 56 MMHG | SYSTOLIC BLOOD PRESSURE: 102 MMHG

## 2019-11-09 VITALS — DIASTOLIC BLOOD PRESSURE: 50 MMHG | SYSTOLIC BLOOD PRESSURE: 106 MMHG

## 2019-11-09 VITALS — SYSTOLIC BLOOD PRESSURE: 97 MMHG | DIASTOLIC BLOOD PRESSURE: 49 MMHG

## 2019-11-09 VITALS — DIASTOLIC BLOOD PRESSURE: 54 MMHG | SYSTOLIC BLOOD PRESSURE: 104 MMHG

## 2019-11-09 VITALS — DIASTOLIC BLOOD PRESSURE: 48 MMHG | SYSTOLIC BLOOD PRESSURE: 98 MMHG

## 2019-11-09 VITALS — SYSTOLIC BLOOD PRESSURE: 111 MMHG | DIASTOLIC BLOOD PRESSURE: 51 MMHG

## 2019-11-09 VITALS — DIASTOLIC BLOOD PRESSURE: 64 MMHG | SYSTOLIC BLOOD PRESSURE: 102 MMHG

## 2019-11-09 LAB
ALBUMIN SERPL-MCNC: 1.5 G/DL (ref 3.4–5)
ALP SERPL-CCNC: 119 U/L (ref 46–116)
ALT SERPL-CCNC: 22 U/L (ref 10–68)
ANION GAP SERPL CALC-SCNC: 10 MMOL/L (ref 8–16)
BILIRUB SERPL-MCNC: 0.83 MG/DL (ref 0.2–1.3)
BUN SERPL-MCNC: 20 MG/DL (ref 7–18)
CALCIUM SERPL-MCNC: 7.7 MG/DL (ref 8.5–10.1)
CHLORIDE SERPL-SCNC: 106 MMOL/L (ref 98–107)
CK SERPL-CCNC: 57 UL (ref 21–232)
CO2 SERPL-SCNC: 26 MMOL/L (ref 21–32)
CREAT SERPL-MCNC: 1.8 MG/DL (ref 0.6–1.3)
EOSINOPHIL NFR BLD: 2 % (ref 0–7)
ERYTHROCYTE [DISTWIDTH] IN BLOOD BY AUTOMATED COUNT: 18.5 % (ref 11.5–14.5)
GLOBULIN SER-MCNC: 3.5 G/L
GLUCOSE SERPL-MCNC: 101 MG/DL (ref 74–106)
HCT VFR BLD CALC: 25.9 % (ref 42–54)
HGB BLD-MCNC: 8.3 G/DL (ref 13.5–17.5)
LYMPHOCYTES NFR BLD AUTO: 4 % (ref 15–50)
MAGNESIUM SERPL-MCNC: 1.7 MG/DL (ref 1.8–2.4)
MCH RBC QN AUTO: 26.4 PG (ref 26–34)
MCHC RBC AUTO-ENTMCNC: 32 G/DL (ref 31–37)
MCV RBC: 82.5 FL (ref 80–100)
MONOCYTES NFR BLD: 1 % (ref 2–11)
NEUTROPHILS NFR BLD AUTO: 92 % (ref 40–80)
OSMOLALITY SERPL CALC.SUM OF ELEC: 278 MOSM/KG (ref 275–300)
PHOSPHATE SERPL-MCNC: 2.3 MG/DL (ref 2.5–4.9)
PLATELET # BLD EST: NORMAL 10*3/UL
PLATELET # BLD: 207 10X3/UL (ref 130–400)
PMV BLD AUTO: 10.1 FL (ref 7.4–10.4)
POTASSIUM SERPL-SCNC: 4 MMOL/L (ref 3.5–5.1)
PROT SERPL-MCNC: 5 G/DL (ref 6.4–8.2)
RBC # BLD AUTO: 3.14 10X6/UL (ref 4.2–6.1)
SODIUM SERPL-SCNC: 138 MMOL/L (ref 136–145)
TROPONIN I SERPL-MCNC: 0.04 NG/ML (ref 0–0.06)
WBC # BLD AUTO: 23.3 10X3/UL (ref 4.8–10.8)

## 2019-11-09 NOTE — NUR
PT ASSISTED BACK TO BED. DOBUTAMINE GTT STARTED AS ORDERED AND 1ST UPRBC'S
BEGAN. PT WITH LOOSE STOOLS. CLEANED AND LINENS CHANGED.

## 2019-11-09 NOTE — NUR
PT INCONTINENT OF URINE, COMPLETE HIBICLENS BATH AND LINEN CHANGE PROVIDED, PT
REPOSITIONED UP IN BED AND ONTO LEFT SIDE SUPPORTED WITH PILLOW, PT DENIES
NEEDS, SR UP X 2, BED IN LOW POSITION, CALL LIGHT IN REACH, BED ALARM ON.

## 2019-11-09 NOTE — NUR
REASSESSMENT COMPLETED, PT CONFUSED UPON AWAKENING, O2 SAT 98, RT AT BS,
OXYGEN WEANED TO 3LITERS VIA HIGH FLOW, URINAL EMPTIED OF 275CC YELLOW URINE,
PERICARE PROVIDED, URINAL PLACED WITHIN EASY REACH, PT DENIES FURTHER NEEDS,
SR UP X 2, CALL LIGHT IN REACH.

## 2019-11-09 NOTE — NUR
PT DOZING AT INTERVALS , REASSESSMENT COMPLETED, PT ORIENTED TO PERSON AND
PLACE, STATES "I DON'T KNOW WHEN ASKED THE YEAR", PT ASSISTED TO REPOSITION
FOR COMFORT, VSS, BED ALARM ON, WILL CONT TO MONITOR FOR CHANGES.

## 2019-11-09 NOTE — NUR
O2 SAT DECREASED TO 79%, UPON ENTERING ROOM FOUND THAT PT HAD REMOVED 9L H
FLOW NC, NC PLACED BACK ON PT AND PT INSTRUCTED NO TO PULL OXYGEN OFF,
COUGHING AND DEEP BREATHING DONE, PT WAS SPONTANEOUSLY USING INCENTIVE
SPIROMETER EARLIER PULLING 750.

## 2019-11-09 NOTE — NUR
MAG REPLACEMENT PER ELECTROLYTE PROTOCOL. BREAKFAST TRAY SERVED AND PT FEEDS
SELF AFTER MEAL TRAY SET UP.

## 2019-11-09 NOTE — NUR
PT AWAKE ASKING FOR SOMETHING TO DRINK, HERVE COLA PROVIDED ON REQUEST, PT
DENIES PAIN OR OTHER NEEDS.

## 2019-11-09 NOTE — NUR
REPORT REC'D AND CARE ASSUMED, REC'D PT RESTING IN BED, AWAKE AND ALERT, O2 AT
7LITERS HIGH FLOW VIA NC, O2 SAT 98%, RIGHT UPPER ARM PICC LINE DRSG CDI,
RIGHT ARM WITH REDENESS, SEMIFIRM, AND EDEMA, MD AWARE, CM-SR @ 72, ORIENTED X
3, NS @ 10CC/HR, DOBUTAMINE @ 5MCG/KG/MIN OR 10.5 CC/HR, HEPARIN @ 900
UNITS/HR AND 2ND UNIT PRBC INFUSING AT THIS TIME, ABD SOFT, PT DENIES PAIN AT
THIS TIME, MAEE, PPP, BEDPAD WET, PARTIAL BATH AND LINEN CHANGE PROVIDED WITH
OFF GOING SHIFT, PT REPOSITIONED UP IN BED FOR COMFORT, BED IN LOW POSITION,
SR UP X 2, CALL LIGHT IN REACH.

## 2019-11-10 VITALS — SYSTOLIC BLOOD PRESSURE: 95 MMHG | DIASTOLIC BLOOD PRESSURE: 44 MMHG

## 2019-11-10 VITALS — SYSTOLIC BLOOD PRESSURE: 93 MMHG | DIASTOLIC BLOOD PRESSURE: 53 MMHG

## 2019-11-10 VITALS — DIASTOLIC BLOOD PRESSURE: 49 MMHG | SYSTOLIC BLOOD PRESSURE: 102 MMHG

## 2019-11-10 VITALS — DIASTOLIC BLOOD PRESSURE: 50 MMHG | SYSTOLIC BLOOD PRESSURE: 99 MMHG

## 2019-11-10 VITALS — DIASTOLIC BLOOD PRESSURE: 48 MMHG | SYSTOLIC BLOOD PRESSURE: 104 MMHG

## 2019-11-10 VITALS — SYSTOLIC BLOOD PRESSURE: 81 MMHG | DIASTOLIC BLOOD PRESSURE: 41 MMHG

## 2019-11-10 VITALS — SYSTOLIC BLOOD PRESSURE: 113 MMHG | DIASTOLIC BLOOD PRESSURE: 54 MMHG

## 2019-11-10 VITALS — SYSTOLIC BLOOD PRESSURE: 108 MMHG | DIASTOLIC BLOOD PRESSURE: 53 MMHG

## 2019-11-10 VITALS — SYSTOLIC BLOOD PRESSURE: 87 MMHG | DIASTOLIC BLOOD PRESSURE: 46 MMHG

## 2019-11-10 VITALS — DIASTOLIC BLOOD PRESSURE: 42 MMHG | SYSTOLIC BLOOD PRESSURE: 89 MMHG

## 2019-11-10 VITALS — SYSTOLIC BLOOD PRESSURE: 93 MMHG | DIASTOLIC BLOOD PRESSURE: 48 MMHG

## 2019-11-10 VITALS — DIASTOLIC BLOOD PRESSURE: 54 MMHG | SYSTOLIC BLOOD PRESSURE: 111 MMHG

## 2019-11-10 VITALS — SYSTOLIC BLOOD PRESSURE: 106 MMHG | DIASTOLIC BLOOD PRESSURE: 44 MMHG

## 2019-11-10 VITALS — SYSTOLIC BLOOD PRESSURE: 89 MMHG | DIASTOLIC BLOOD PRESSURE: 44 MMHG

## 2019-11-10 VITALS — DIASTOLIC BLOOD PRESSURE: 50 MMHG | SYSTOLIC BLOOD PRESSURE: 100 MMHG

## 2019-11-10 VITALS — SYSTOLIC BLOOD PRESSURE: 110 MMHG | DIASTOLIC BLOOD PRESSURE: 53 MMHG

## 2019-11-10 VITALS — DIASTOLIC BLOOD PRESSURE: 49 MMHG | SYSTOLIC BLOOD PRESSURE: 101 MMHG

## 2019-11-10 VITALS — DIASTOLIC BLOOD PRESSURE: 47 MMHG | SYSTOLIC BLOOD PRESSURE: 98 MMHG

## 2019-11-10 VITALS — DIASTOLIC BLOOD PRESSURE: 49 MMHG | SYSTOLIC BLOOD PRESSURE: 104 MMHG

## 2019-11-10 VITALS — SYSTOLIC BLOOD PRESSURE: 105 MMHG | DIASTOLIC BLOOD PRESSURE: 50 MMHG

## 2019-11-10 VITALS — SYSTOLIC BLOOD PRESSURE: 102 MMHG | DIASTOLIC BLOOD PRESSURE: 52 MMHG

## 2019-11-10 VITALS — SYSTOLIC BLOOD PRESSURE: 109 MMHG | DIASTOLIC BLOOD PRESSURE: 56 MMHG

## 2019-11-10 LAB
ALBUMIN SERPL-MCNC: 1.6 G/DL (ref 3.4–5)
ALP SERPL-CCNC: 126 U/L (ref 46–116)
ALT SERPL-CCNC: 21 U/L (ref 10–68)
ANION GAP SERPL CALC-SCNC: 10 MMOL/L (ref 8–16)
BASOPHILS NFR BLD AUTO: 0 % (ref 0–2)
BILIRUB SERPL-MCNC: 1.07 MG/DL (ref 0.2–1.3)
BUN SERPL-MCNC: 21 MG/DL (ref 7–18)
CALCIUM SERPL-MCNC: 7.6 MG/DL (ref 8.5–10.1)
CHLORIDE SERPL-SCNC: 106 MMOL/L (ref 98–107)
CO2 SERPL-SCNC: 26.2 MMOL/L (ref 21–32)
CREAT SERPL-MCNC: 2.3 MG/DL (ref 0.6–1.3)
EOSINOPHIL NFR BLD: 2.3 % (ref 0–7)
ERYTHROCYTE [DISTWIDTH] IN BLOOD BY AUTOMATED COUNT: 17.5 % (ref 11.5–14.5)
GLOBULIN SER-MCNC: 3.8 G/L
GLUCOSE SERPL-MCNC: 84 MG/DL (ref 74–106)
HCT VFR BLD CALC: 31 % (ref 42–54)
HGB BLD-MCNC: 10.2 G/DL (ref 13.5–17.5)
IMM GRANULOCYTES NFR BLD: 0.5 % (ref 0–5)
LYMPHOCYTES NFR BLD AUTO: 5 % (ref 15–50)
MAGNESIUM SERPL-MCNC: 2.1 MG/DL (ref 1.8–2.4)
MCH RBC QN AUTO: 27.3 PG (ref 26–34)
MCHC RBC AUTO-ENTMCNC: 32.9 G/DL (ref 31–37)
MCV RBC: 83.1 FL (ref 80–100)
MONOCYTES NFR BLD: 3.9 % (ref 2–11)
NEUTROPHILS NFR BLD AUTO: 88.3 % (ref 40–80)
OSMOLALITY SERPL CALC.SUM OF ELEC: 277 MOSM/KG (ref 275–300)
PHOSPHATE SERPL-MCNC: 3.2 MG/DL (ref 2.5–4.9)
PLATELET # BLD: 220 10X3/UL (ref 130–400)
PMV BLD AUTO: 10.1 FL (ref 7.4–10.4)
POTASSIUM SERPL-SCNC: 4.2 MMOL/L (ref 3.5–5.1)
PROT SERPL-MCNC: 5.4 G/DL (ref 6.4–8.2)
RBC # BLD AUTO: 3.73 10X6/UL (ref 4.2–6.1)
SODIUM SERPL-SCNC: 138 MMOL/L (ref 136–145)
WBC # BLD AUTO: 20.3 10X3/UL (ref 4.8–10.8)

## 2019-11-10 NOTE — NUR
ASLEEP SHOWING NO SS OF DISTRESS. REASSESSMENT COMPLETE. NO NEW CHANGES.
REMAINS AAOX4. VSS. DENIES ANY NEEDS AT THIS TIME. FRESH ICE DRINK PROVIDED.
SAFETY MEASURES IN PLACE. CBIR. REPOSITIONED FOR COMFORT.

## 2019-11-10 NOTE — NUR
BATH AND LINEN CHANGE OFFERED. PT DECLINED STATING, "I'D LIKE TO HAVE MY BATH
IN THE MORNING". REPLACEMENT LINENS AND BATH EQUIPMENT AT BEDSIDE.
REPOSITIONED. TOLERATED WELL.

## 2019-11-10 NOTE — NUR
URINAL EMPTIED  CC CLEAR YELLOW URINE, BED PAD WET, COMPLETE HIBICLENS
BATH AND LINEN CHANGE PROVIDED, PT REPOSITIONED UP IN BED AND ONTO RIGHT SIDE
SUPPORTED WITH PILLOWS, HEELS BRIDGED, SR UP X 2, CALL LIGHT IN REACH.

## 2019-11-10 NOTE — NUR
ASSESSMENT COMPLETE. PT AAOX4. LAYING IN BED WITH EYES CLOSED. C/O ABD PAIN OF
5/10 ON NUMERIC SCALE. PERRLA. CRACKLES HEARD BILATERALLY DURING LUNG
AUSCULTATION. IS INITIATED WITH FLUTTER VALVE. RT ARM SWELLING DEPENDENT AND
TENDER TO TOUCH. RT UPPER ARM PICC LINE PATENT. SEE IV DRIP FLOWSHEET FOR FULL
MEDICATION DRIP RATES. STAGE ONE PRESSURE ULCER LOCATED ON COCCYX. CONDOM CATH
IN PLACE AND DRAINING YELLOW URINE IN COLLECTION CONTAINER. DENIES ANY NEEDS
AT THIS TIME. SAFETY MEASURES IN PLACE. CBIR. SEE FLOWSHEET FOR FULL HEAD TO
TOE DETAILS.

## 2019-11-10 NOTE — NUR
REASSESSMENT COMPLETED, PT CONFUSED AS TO TIME AND PLACE UPON AWAKENING,
EASILY REORIENTED, PT DENIES PAIN, BP STABLE, PT ASSISTED TO REPOSITION FOR
COMFORT, SR UP X 2, BED IN LOW POSITION.

## 2019-11-10 NOTE — NUR
BEDSIDE SHIFT REPORT GIVEN BY DEPARTING RN. ORIENTED PT TO NIGHT NURSE. DENIES
ANY NEEDS AT THIS TIME.

## 2019-11-11 VITALS — SYSTOLIC BLOOD PRESSURE: 112 MMHG | DIASTOLIC BLOOD PRESSURE: 53 MMHG

## 2019-11-11 VITALS — SYSTOLIC BLOOD PRESSURE: 130 MMHG | DIASTOLIC BLOOD PRESSURE: 72 MMHG

## 2019-11-11 VITALS — DIASTOLIC BLOOD PRESSURE: 54 MMHG | SYSTOLIC BLOOD PRESSURE: 110 MMHG

## 2019-11-11 VITALS — DIASTOLIC BLOOD PRESSURE: 50 MMHG | SYSTOLIC BLOOD PRESSURE: 106 MMHG

## 2019-11-11 VITALS — DIASTOLIC BLOOD PRESSURE: 55 MMHG | SYSTOLIC BLOOD PRESSURE: 116 MMHG

## 2019-11-11 VITALS — DIASTOLIC BLOOD PRESSURE: 53 MMHG | SYSTOLIC BLOOD PRESSURE: 118 MMHG

## 2019-11-11 VITALS — SYSTOLIC BLOOD PRESSURE: 123 MMHG | DIASTOLIC BLOOD PRESSURE: 58 MMHG

## 2019-11-11 VITALS — SYSTOLIC BLOOD PRESSURE: 126 MMHG | DIASTOLIC BLOOD PRESSURE: 55 MMHG

## 2019-11-11 VITALS — SYSTOLIC BLOOD PRESSURE: 108 MMHG | DIASTOLIC BLOOD PRESSURE: 53 MMHG

## 2019-11-11 VITALS — SYSTOLIC BLOOD PRESSURE: 107 MMHG | DIASTOLIC BLOOD PRESSURE: 54 MMHG

## 2019-11-11 VITALS — SYSTOLIC BLOOD PRESSURE: 110 MMHG | DIASTOLIC BLOOD PRESSURE: 71 MMHG

## 2019-11-11 VITALS — DIASTOLIC BLOOD PRESSURE: 56 MMHG | SYSTOLIC BLOOD PRESSURE: 126 MMHG

## 2019-11-11 VITALS — SYSTOLIC BLOOD PRESSURE: 132 MMHG | DIASTOLIC BLOOD PRESSURE: 53 MMHG

## 2019-11-11 VITALS — DIASTOLIC BLOOD PRESSURE: 64 MMHG | SYSTOLIC BLOOD PRESSURE: 124 MMHG

## 2019-11-11 VITALS — DIASTOLIC BLOOD PRESSURE: 49 MMHG | SYSTOLIC BLOOD PRESSURE: 110 MMHG

## 2019-11-11 VITALS — SYSTOLIC BLOOD PRESSURE: 105 MMHG | DIASTOLIC BLOOD PRESSURE: 48 MMHG

## 2019-11-11 VITALS — SYSTOLIC BLOOD PRESSURE: 109 MMHG | DIASTOLIC BLOOD PRESSURE: 55 MMHG

## 2019-11-11 VITALS — DIASTOLIC BLOOD PRESSURE: 55 MMHG | SYSTOLIC BLOOD PRESSURE: 121 MMHG

## 2019-11-11 VITALS — SYSTOLIC BLOOD PRESSURE: 108 MMHG | DIASTOLIC BLOOD PRESSURE: 51 MMHG

## 2019-11-11 VITALS — SYSTOLIC BLOOD PRESSURE: 112 MMHG | DIASTOLIC BLOOD PRESSURE: 57 MMHG

## 2019-11-11 VITALS — SYSTOLIC BLOOD PRESSURE: 143 MMHG | DIASTOLIC BLOOD PRESSURE: 62 MMHG

## 2019-11-11 VITALS — SYSTOLIC BLOOD PRESSURE: 97 MMHG | DIASTOLIC BLOOD PRESSURE: 42 MMHG

## 2019-11-11 VITALS — DIASTOLIC BLOOD PRESSURE: 50 MMHG | SYSTOLIC BLOOD PRESSURE: 110 MMHG

## 2019-11-11 LAB
ALBUMIN SERPL-MCNC: 1.7 G/DL (ref 3.4–5)
ALP SERPL-CCNC: 157 U/L (ref 46–116)
ALT SERPL-CCNC: 27 U/L (ref 10–68)
ANION GAP SERPL CALC-SCNC: 10.5 MMOL/L (ref 8–16)
BASOPHILS NFR BLD AUTO: 0.1 % (ref 0–2)
BILIRUB SERPL-MCNC: 0.99 MG/DL (ref 0.2–1.3)
BUN SERPL-MCNC: 22 MG/DL (ref 7–18)
CALCIUM SERPL-MCNC: 7.7 MG/DL (ref 8.5–10.1)
CHLORIDE SERPL-SCNC: 105 MMOL/L (ref 98–107)
CO2 SERPL-SCNC: 25.7 MMOL/L (ref 21–32)
CREAT SERPL-MCNC: 2.4 MG/DL (ref 0.6–1.3)
EOSINOPHIL NFR BLD: 4.2 % (ref 0–7)
ERYTHROCYTE [DISTWIDTH] IN BLOOD BY AUTOMATED COUNT: 17.8 % (ref 11.5–14.5)
ERYTHROCYTE [DISTWIDTH] IN BLOOD BY AUTOMATED COUNT: 17.8 % (ref 11.5–14.5)
GLOBULIN SER-MCNC: 3.7 G/L
GLUCOSE SERPL-MCNC: 75 MG/DL (ref 74–106)
HCT VFR BLD CALC: 28.9 % (ref 42–54)
HCT VFR BLD CALC: 30 % (ref 42–54)
HGB BLD-MCNC: 9.2 G/DL (ref 13.5–17.5)
HGB BLD-MCNC: 9.8 G/DL (ref 13.5–17.5)
IMM GRANULOCYTES NFR BLD: 0.5 % (ref 0–5)
LYMPHOCYTES NFR BLD AUTO: 6.5 % (ref 15–50)
MAGNESIUM SERPL-MCNC: 2 MG/DL (ref 1.8–2.4)
MCH RBC QN AUTO: 26.5 PG (ref 26–34)
MCH RBC QN AUTO: 27.2 PG (ref 26–34)
MCHC RBC AUTO-ENTMCNC: 31.8 G/DL (ref 31–37)
MCHC RBC AUTO-ENTMCNC: 32.7 G/DL (ref 31–37)
MCV RBC: 83.3 FL (ref 80–100)
MCV RBC: 83.3 FL (ref 80–100)
MONOCYTES NFR BLD: 5.6 % (ref 2–11)
NEUTROPHILS NFR BLD AUTO: 83.1 % (ref 40–80)
OSMOLALITY SERPL CALC.SUM OF ELEC: 275 MOSM/KG (ref 275–300)
PHOSPHATE SERPL-MCNC: 3 MG/DL (ref 2.5–4.9)
PLATELET # BLD: 261 10X3/UL (ref 130–400)
PLATELET # BLD: 278 10X3/UL (ref 130–400)
PMV BLD AUTO: 10.2 FL (ref 7.4–10.4)
PMV BLD AUTO: 9.9 FL (ref 7.4–10.4)
POTASSIUM SERPL-SCNC: 4.2 MMOL/L (ref 3.5–5.1)
PROT SERPL-MCNC: 5.4 G/DL (ref 6.4–8.2)
RBC # BLD AUTO: 3.47 10X6/UL (ref 4.2–6.1)
RBC # BLD AUTO: 3.6 10X6/UL (ref 4.2–6.1)
SODIUM SERPL-SCNC: 137 MMOL/L (ref 136–145)
WBC # BLD AUTO: 14.9 10X3/UL (ref 4.8–10.8)
WBC # BLD AUTO: 15.6 10X3/UL (ref 4.8–10.8)

## 2019-11-11 PROCEDURE — 05HY33Z INSERTION OF INFUSION DEVICE INTO UPPER VEIN, PERCUTANEOUS APPROACH: ICD-10-PCS | Performed by: INTERNAL MEDICINE

## 2019-11-11 NOTE — NUR
new picc line has been placed. all new iv lines started. pt given dinner. ate
1 or 2 bites and drank his milk and half of his ensure.

## 2019-11-11 NOTE — NUR
BEDSIDE SHIFT REPORT GIVEN BY DEPARTING RN. PT LAYING IN BED WITH EYES CLOSED.
AAOX4. PERRLA. DENIES PAIN AT THIS TIME. 4L NC WITH 95% O2 SAT. NO DISTRESS
NOTED. LLL CRACKLES AUSCULTATED. KENZIE PICC PATENT AND INFUSING MD ORDERED MEDS.
CONDOM CATH IN PLACE AND DRAINING TO GRAVITY WITH COLLECTION CONTAINER ON ICE
FOR 24H URINE COLLECTION. VSS. REPOSITIONED FOR COMFORT. DENIES ANY NEEDS AT
THIS TIME. SAFETY MEASURES IN PLACE. CBIR.

## 2019-11-11 NOTE — NUR
PT CLEANED UP FROM EPISODE OF INCONTINENCE OF STOOL. THEN WALKED WITH PHYSICAL
THERAPY USING WALKER FROM CHAIR ON ONE SIDE OF ROOM TO AROUND THE BED TO OTHER
SIDE. NOW RESTING IN BED. CALL LIGHT IN REACH.

## 2019-11-11 NOTE — NUR
Nutrition Follow-up:
Pt reports eating small amt of breakfast this AM.
Diet: Regular, Ensure TID
PO intake: 30% avg x 6 meals
Wt: 153#
Last BM: 11/11
Labs reviewed
Meds noted: Bumex, Micro K, Albumin
-Continue current diet as tolerated.
-May consider appetite stimulant.
-If PO intake remains poor, rec Procalamine.
-RD following.

## 2019-11-11 NOTE — NUR
PT UP IN CHAIR AT THIS TIME. HAD BEEN ASSISTED BY PHYSICAL THERAPIST. WANTING
TO GO BACK TO BED, EXPLAINED AS PART OF THERAPY NEEDS TO BE UP FOR A WHILE. PT
INDICATED HE UNDERSTOOD. CALL LIGHT IN REACH.

## 2019-11-11 NOTE — NUR
FAMILY AT BEDSIDE. CAME TO DESK SAYING PT C/O PAIN. PAIN MEDICATION PROVIDED
FOR PAIN LEVEL OF 6. INDICATES PAIN IS ACROSS UPPER ABDOMEN AND HAS A
HEADACHE. SCHEDULED MEDICATIONS PROVIDED. DAUGHTERS WERE ASKING WHEN PATIENT
WOULD BE GETTING OUT OF ICU. LET HER KNOW UNSURE OF WHEN IT WILL BE BECAUSE PT
IS ON DRIPS FOR HEART AT THIS TIME. PT WAS REPOSITIONED IN CHAIR FOR COMFORT.
CALL LIGHT IN REACH.

## 2019-11-11 NOTE — NUR
REPORT RECEIVED. SHIFT ASSESSMENT COMPLETE. PT AWAKENS TO VOICE. ORIENTED.
DENIES PAIN AT THIS TIME. SAYS HE DOES NOT WANT BREAKFAST TODAY, NOT HUNGRY.
RIGHT ARM IS EDEMATOUS AND SOFT. PT HAS PICC LINE IN THIS ARM. WILL DISCUSS
WITH PHYSICIAN NEED TO HAVE MOVED SINCE PT NOTED TO HAVE DVT IN THIS ARM.
LEFT UPPER ARM ALSO SHOWING SOME EDEMA, ALSO SOFT.
LUNG SOUNDS ALL FIELDS HAVE CRACKLES. NSR ON THE MONITOR. PT IS AFEBRILE.

## 2019-11-11 NOTE — NUR
REASSESSMENT COMPLETE. NO NEW CHANGES NOTED IN PT CONDITION. VSS. REPOSITIONED
FOR COMFORT. SAFETY MEASURES IN PLACE. CBIR.

## 2019-11-12 VITALS — SYSTOLIC BLOOD PRESSURE: 145 MMHG | DIASTOLIC BLOOD PRESSURE: 72 MMHG

## 2019-11-12 VITALS — DIASTOLIC BLOOD PRESSURE: 66 MMHG | SYSTOLIC BLOOD PRESSURE: 137 MMHG

## 2019-11-12 VITALS — SYSTOLIC BLOOD PRESSURE: 120 MMHG | DIASTOLIC BLOOD PRESSURE: 51 MMHG

## 2019-11-12 VITALS — SYSTOLIC BLOOD PRESSURE: 127 MMHG | DIASTOLIC BLOOD PRESSURE: 62 MMHG

## 2019-11-12 VITALS — DIASTOLIC BLOOD PRESSURE: 76 MMHG | SYSTOLIC BLOOD PRESSURE: 127 MMHG

## 2019-11-12 VITALS — DIASTOLIC BLOOD PRESSURE: 63 MMHG | SYSTOLIC BLOOD PRESSURE: 141 MMHG

## 2019-11-12 VITALS — DIASTOLIC BLOOD PRESSURE: 57 MMHG | SYSTOLIC BLOOD PRESSURE: 129 MMHG

## 2019-11-12 VITALS — SYSTOLIC BLOOD PRESSURE: 135 MMHG | DIASTOLIC BLOOD PRESSURE: 60 MMHG

## 2019-11-12 VITALS — SYSTOLIC BLOOD PRESSURE: 127 MMHG | DIASTOLIC BLOOD PRESSURE: 50 MMHG

## 2019-11-12 VITALS — SYSTOLIC BLOOD PRESSURE: 154 MMHG | DIASTOLIC BLOOD PRESSURE: 58 MMHG

## 2019-11-12 VITALS — SYSTOLIC BLOOD PRESSURE: 137 MMHG | DIASTOLIC BLOOD PRESSURE: 54 MMHG

## 2019-11-12 VITALS — DIASTOLIC BLOOD PRESSURE: 58 MMHG | SYSTOLIC BLOOD PRESSURE: 132 MMHG

## 2019-11-12 VITALS — SYSTOLIC BLOOD PRESSURE: 118 MMHG | DIASTOLIC BLOOD PRESSURE: 64 MMHG

## 2019-11-12 VITALS — DIASTOLIC BLOOD PRESSURE: 47 MMHG | SYSTOLIC BLOOD PRESSURE: 119 MMHG

## 2019-11-12 VITALS — SYSTOLIC BLOOD PRESSURE: 126 MMHG | DIASTOLIC BLOOD PRESSURE: 70 MMHG

## 2019-11-12 VITALS — SYSTOLIC BLOOD PRESSURE: 127 MMHG | DIASTOLIC BLOOD PRESSURE: 57 MMHG

## 2019-11-12 VITALS — DIASTOLIC BLOOD PRESSURE: 78 MMHG | SYSTOLIC BLOOD PRESSURE: 117 MMHG

## 2019-11-12 LAB
ANION GAP SERPL CALC-SCNC: 7.8 MMOL/L (ref 8–16)
APPEARANCE UR: CLEAR
BASOPHILS NFR BLD AUTO: 0.2 % (ref 0–2)
BILIRUB SERPL-MCNC: NEGATIVE MG/DL
BUN SERPL-MCNC: 24 MG/DL (ref 7–18)
CALCIUM SERPL-MCNC: 7.5 MG/DL (ref 8.5–10.1)
CHLORIDE SERPL-SCNC: 107 MMOL/L (ref 98–107)
CO2 SERPL-SCNC: 29.4 MMOL/L (ref 21–32)
COLOR UR: YELLOW
CREAT CL PREDICTED SERPL C-G-VRATE: 9 ML/MIN (ref 71–135)
CREAT SERPL-MCNC: 2.5 MG/DL (ref 0.6–1.3)
CREAT SERPL-MCNC: 2.5 MG/DL (ref 0.6–1.3)
CREATININE - URINE: 14.6 MG/DL (ref 30–125)
EOSINOPHIL NFR BLD: 3.7 % (ref 0–7)
ERYTHROCYTE [DISTWIDTH] IN BLOOD BY AUTOMATED COUNT: 18.4 % (ref 11.5–14.5)
GLUCOSE SERPL-MCNC: 100 MG/DL (ref 74–106)
GLUCOSE SERPL-MCNC: NEGATIVE MG/DL
HCT VFR BLD CALC: 29.3 % (ref 42–54)
HGB BLD-MCNC: 9.3 G/DL (ref 13.5–17.5)
IMM GRANULOCYTES NFR BLD: 0.4 % (ref 0–5)
KETONES UR STRIP-MCNC: NEGATIVE MG/DL
LYMPHOCYTES NFR BLD AUTO: 5.8 % (ref 15–50)
MAGNESIUM SERPL-MCNC: 1.9 MG/DL (ref 1.8–2.4)
MCH RBC QN AUTO: 27.1 PG (ref 26–34)
MCHC RBC AUTO-ENTMCNC: 31.7 G/DL (ref 31–37)
MCV RBC: 85.4 FL (ref 80–100)
MONOCYTES NFR BLD: 7.3 % (ref 2–11)
NEUTROPHILS NFR BLD AUTO: 82.6 % (ref 40–80)
NITRITE UR-MCNC: NEGATIVE MG/ML
OSMOLALITY SERPL CALC.SUM OF ELEC: 282 MOSM/KG (ref 275–300)
PH UR STRIP: 5 [PH] (ref 5–6)
PHOSPHATE SERPL-MCNC: 3.3 MG/DL (ref 2.5–4.9)
PLATELET # BLD: 322 10X3/UL (ref 130–400)
PMV BLD AUTO: 9.9 FL (ref 7.4–10.4)
POTASSIUM SERPL-SCNC: 4.2 MMOL/L (ref 3.5–5.1)
PROT 24H UR-MRATE: 418 MG/24HR (ref 0–149.1)
PROT UR-MCNC: 17.4 MG/DL (ref 0–11.9)
PROT UR-MCNC: NEGATIVE MG/DL
RBC # BLD AUTO: 3.43 10X6/UL (ref 4.2–6.1)
SODIUM SERPL-SCNC: 140 MMOL/L (ref 136–145)
SP GR UR STRIP: 1.01 (ref 1–1.02)
UROBILINOGEN UR-MCNC: NORMAL MG/DL
VANCOMYCIN SERPL-MCNC: 33.1 UG/ML (ref 10–20)
WBC # BLD AUTO: 11.5 10X3/UL (ref 4.8–10.8)

## 2019-11-12 NOTE — NUR
DR MORRIS RETURNED PAGE. ONE TIME ORDER OF 40 MG LASIX IVP. ORDER RECEIVED,
VERIFIED, AND READ BACK.

## 2019-11-12 NOTE — NUR
ASSESSMENT COMPLETE. AAOX4. PERRLA. BILATERAL LUNGS WET AND COARSE SOUNDING.
NO SOB OR DISTRESS NOTED AT THIS TIME. O2 SAT 96% ON 3L NC. IS IN USE. IS
PULLED . NURSE DEMONSTRATED PROPER IS USAGE. DEMONSTRATED RETURN BY PT.
VSS. LEFT UA PICC NOTED AND INFUSING MD ORDERED MEDS WITHOUT DIFFICULTY.
AFEBRILE. REPORTED BY DAY NURSE MULTIPLE BOUTS OF DIARHEA. LINENS CLEAN AND
DRY AT THIS TIME. CONDOM CATH IN PLACE AND DRAINING TO GRAVITY. SAFETY
MEASURES IN PLACE. CBIR.

## 2019-11-12 NOTE — NUR
BEDSIDE SHIFT REPORT GIVEN BY DEPARTING RN. PT LAYING IN BED ASLEEP SHOWING NO
SS OF DISTRESS. DENIES ANY NEEDS AT THIS TIME. C/O ABD PAIN OF 6/10. PRN PAIN
MED AND LOMOTIL GIVEN. SEE MAR FOR FULL DETAILS.

## 2019-11-12 NOTE — NUR
PT INCONTINENT OF BOWEL. LIQUID. PT CLEANED AND REPOSITIONED. TOLERATED WELL.
VSS. WILL CONTINUE TO MONITOR.

## 2019-11-12 NOTE — NUR
REASSESSMENT COMPLETE. NO NEW CHANGES. VSS. LUNG SOUNDS CLEAR. WATCHING TV
SHOWING NO SS OF DISTRESS. C/O ABD PAIN OF 5/10. DENIES ANY NEEDS. SEE
FLOWSHEET FOR FULL DETAILS. SAFETY MEASURES IN PLACE. CBIR.

## 2019-11-12 NOTE — NUR
REPORT RECEIVED. PT AWAKE AND ALERT. HE HAS A LEFT PICC WITH DOBUTAMINE
INFUSING AT 5MCG/KG/MIN, HEPARIN  UNITS/ML, AND NS AT 5ML/HR. HE IS ON
O2 AT 4L. HE HAS A CONDOM CATH FROM WHICH WE ARE COLLECTING A 24 HR URINE.
HEAD TO TOE ASSESSMENT COMPLETED. VSS. WILL CONTINUE TO MONITOR.

## 2019-11-12 NOTE — NUR
REASSESSMENT COMPLETE. NO NEW CHANGES NOTED. ASLEEP SHOWING NO SS OF DISTRESS.
VSS. REPOSITIONED FOR COMFORT.

## 2019-11-12 NOTE — NUR
DR. MORRIS PAGED REGARDING BILATERAL LUNG AUSCULTATION FINDINGS. AWAITING
RETURN CALL AT THIS TIME. O2 SAT 98% ON 3L NC. NO DISTRESS NOTED AT THIS TIME.

## 2019-11-12 NOTE — NUR
PT BACK TO BED. XR OF CHEST DONE. PT INCONTINENT OF STOOL. CLEANED. BARRIER
CREAM APPLIED TO BOTTOM.
LAB DRAWN FOR PTT.

## 2019-11-12 NOTE — NUR
Nutrition Follow-up:
PO intake remains poor. Megace added.
Diet: Regular, Ensure TID
PO intake: 10-15% yesterday
Wt: 149.9# (153# yesterday)
Last BM: 11/12
Labs reviewed
Meds noted: Megace, Bumex, Micro K
Pt with severe malnutrition of acute illness R/T CHF, diverticulitis AEB:
1. <=50% intake of est energy needs for >=5 days.
2. Fluid accumulation.
3. Reduced  strength.
-Continue current diet as tolerated.
-RD following.

## 2019-11-13 VITALS — SYSTOLIC BLOOD PRESSURE: 90 MMHG | DIASTOLIC BLOOD PRESSURE: 47 MMHG

## 2019-11-13 VITALS — DIASTOLIC BLOOD PRESSURE: 57 MMHG | SYSTOLIC BLOOD PRESSURE: 120 MMHG

## 2019-11-13 VITALS — DIASTOLIC BLOOD PRESSURE: 64 MMHG | SYSTOLIC BLOOD PRESSURE: 120 MMHG

## 2019-11-13 VITALS — DIASTOLIC BLOOD PRESSURE: 54 MMHG | SYSTOLIC BLOOD PRESSURE: 94 MMHG

## 2019-11-13 VITALS — SYSTOLIC BLOOD PRESSURE: 141 MMHG | DIASTOLIC BLOOD PRESSURE: 51 MMHG

## 2019-11-13 VITALS — DIASTOLIC BLOOD PRESSURE: 55 MMHG | SYSTOLIC BLOOD PRESSURE: 110 MMHG

## 2019-11-13 VITALS — DIASTOLIC BLOOD PRESSURE: 48 MMHG | SYSTOLIC BLOOD PRESSURE: 111 MMHG

## 2019-11-13 VITALS — DIASTOLIC BLOOD PRESSURE: 57 MMHG | SYSTOLIC BLOOD PRESSURE: 98 MMHG

## 2019-11-13 VITALS — DIASTOLIC BLOOD PRESSURE: 62 MMHG | SYSTOLIC BLOOD PRESSURE: 129 MMHG

## 2019-11-13 VITALS — SYSTOLIC BLOOD PRESSURE: 126 MMHG | DIASTOLIC BLOOD PRESSURE: 61 MMHG

## 2019-11-13 VITALS — DIASTOLIC BLOOD PRESSURE: 54 MMHG | SYSTOLIC BLOOD PRESSURE: 106 MMHG

## 2019-11-13 VITALS — DIASTOLIC BLOOD PRESSURE: 53 MMHG | SYSTOLIC BLOOD PRESSURE: 127 MMHG

## 2019-11-13 VITALS — DIASTOLIC BLOOD PRESSURE: 51 MMHG | SYSTOLIC BLOOD PRESSURE: 120 MMHG

## 2019-11-13 VITALS — DIASTOLIC BLOOD PRESSURE: 59 MMHG | SYSTOLIC BLOOD PRESSURE: 128 MMHG

## 2019-11-13 VITALS — SYSTOLIC BLOOD PRESSURE: 116 MMHG | DIASTOLIC BLOOD PRESSURE: 54 MMHG

## 2019-11-13 VITALS — DIASTOLIC BLOOD PRESSURE: 52 MMHG | SYSTOLIC BLOOD PRESSURE: 100 MMHG

## 2019-11-13 VITALS — SYSTOLIC BLOOD PRESSURE: 96 MMHG | DIASTOLIC BLOOD PRESSURE: 51 MMHG

## 2019-11-13 VITALS — DIASTOLIC BLOOD PRESSURE: 43 MMHG | SYSTOLIC BLOOD PRESSURE: 92 MMHG

## 2019-11-13 VITALS — SYSTOLIC BLOOD PRESSURE: 109 MMHG | DIASTOLIC BLOOD PRESSURE: 50 MMHG

## 2019-11-13 LAB
ANION GAP SERPL CALC-SCNC: 6.9 MMOL/L (ref 8–16)
BASOPHILS NFR BLD AUTO: 0.1 % (ref 0–2)
BUN SERPL-MCNC: 25 MG/DL (ref 7–18)
CALCIUM SERPL-MCNC: 7.9 MG/DL (ref 8.5–10.1)
CHLORIDE SERPL-SCNC: 106 MMOL/L (ref 98–107)
CO2 SERPL-SCNC: 31.2 MMOL/L (ref 21–32)
CREAT SERPL-MCNC: 2.9 MG/DL (ref 0.6–1.3)
EOSINOPHIL NFR BLD: 4.3 % (ref 0–7)
ERYTHROCYTE [DISTWIDTH] IN BLOOD BY AUTOMATED COUNT: 18.5 % (ref 11.5–14.5)
GLUCOSE SERPL-MCNC: 100 MG/DL (ref 74–106)
HCT VFR BLD CALC: 29.2 % (ref 42–54)
HGB BLD-MCNC: 9.3 G/DL (ref 13.5–17.5)
IMM GRANULOCYTES NFR BLD: 0.5 % (ref 0–5)
LYMPHOCYTES NFR BLD AUTO: 7.4 % (ref 15–50)
MCH RBC QN AUTO: 27.4 PG (ref 26–34)
MCHC RBC AUTO-ENTMCNC: 31.8 G/DL (ref 31–37)
MCV RBC: 86.1 FL (ref 80–100)
MONOCYTES NFR BLD: 8.6 % (ref 2–11)
NEUTROPHILS NFR BLD AUTO: 79.1 % (ref 40–80)
OSMOLALITY SERPL CALC.SUM OF ELEC: 282 MOSM/KG (ref 275–300)
PLATELET # BLD: 363 10X3/UL (ref 130–400)
PMV BLD AUTO: 9.9 FL (ref 7.4–10.4)
POTASSIUM SERPL-SCNC: 4.1 MMOL/L (ref 3.5–5.1)
RBC # BLD AUTO: 3.39 10X6/UL (ref 4.2–6.1)
SODIUM SERPL-SCNC: 140 MMOL/L (ref 136–145)
VANCOMYCIN SERPL-MCNC: 28 UG/ML (ref 10–20)
WBC # BLD AUTO: 11 10X3/UL (ref 4.8–10.8)

## 2019-11-13 PROCEDURE — 02HV33Z INSERTION OF INFUSION DEVICE INTO SUPERIOR VENA CAVA, PERCUTANEOUS APPROACH: ICD-10-PCS | Performed by: INTERNAL MEDICINE

## 2019-11-13 PROCEDURE — B548ZZA ULTRASONOGRAPHY OF SUPERIOR VENA CAVA, GUIDANCE: ICD-10-PCS | Performed by: INTERNAL MEDICINE

## 2019-11-13 NOTE — NUR
Patient resting in bed with eyes closed, no s/s of distress at this time.
S1/S2 noted NSR on telemetry, rythmic and regular. Breathing is shallow on 2L
via NC with O2 sat 95%, lung sounds clear bilateral upper and mid with
diminished lower. All pulses palpable with cap refill < 3 sec, skin warm/dry.
Denies pain or other needs at this time, all VSS and will continue to monitor.

## 2019-11-13 NOTE — NUR
Received patient resting in bed with eyes closed, assessment completed per
flowsheet. Patient answers appropriately/follows instructions. S1/S2 noted NSR
on telemetry, rythmic and regular. Breathing is shallow on 2L via NC with O2
sat 96%, lung sounds clear bilateral upper and mid with diminished lower.
Abdomen is round/soft with bowel sounds active x4, non-tender. Patient
incontinent with small yellow urine noted, small sore noted on penis. All
pulses palpable with cap refill < 3 sec, skin warm/dry. Denies pain or other
needs at this time, repositioned for comfort. See flowsheet for details, all
VSS and will continue to monitor.

## 2019-11-13 NOTE — NUR
PT HAS  A STAGE 2 PRESSURE INJURY ON HIS SACRUM MEASURING 1CM X 1CM.
COVERED WITH MEPILEX SACRAL DRESSING AND ENCOURAGED PT TO REPOSITION HIMSELF
OFF HIS BOTTOM EVERY 2 HOURS WHILE IN THE BED, HOURLY IF HE'S UP IN A CHAIR.
HE VOICED UNDERSTANDING.
WOUND CARE WILL MONITOR.
ALSO RECOMMENDED AN AIR OVERLAY MATTRESS.

## 2019-11-13 NOTE — NUR
HS meds given without difficulty, slight disorientation noted to time. Patient
reoriented easily with appropriate responses, denies pain or other needs and
will continue to monitor.

## 2019-11-13 NOTE — NUR
REPORT RECIEVED, SHIFT ASSESSMENT COMPLETE, PT IS ALERT AND ORIENTED, LYING IN
BED, ON 2L NC WITH 97% O2 SAT, ALL PPP, VSS, CALL LIGHT IN REACH

## 2019-11-14 VITALS — SYSTOLIC BLOOD PRESSURE: 95 MMHG | DIASTOLIC BLOOD PRESSURE: 57 MMHG

## 2019-11-14 VITALS — SYSTOLIC BLOOD PRESSURE: 103 MMHG | DIASTOLIC BLOOD PRESSURE: 52 MMHG

## 2019-11-14 VITALS — DIASTOLIC BLOOD PRESSURE: 62 MMHG | SYSTOLIC BLOOD PRESSURE: 134 MMHG

## 2019-11-14 VITALS — DIASTOLIC BLOOD PRESSURE: 57 MMHG | SYSTOLIC BLOOD PRESSURE: 119 MMHG

## 2019-11-14 VITALS — SYSTOLIC BLOOD PRESSURE: 94 MMHG | DIASTOLIC BLOOD PRESSURE: 60 MMHG

## 2019-11-14 VITALS — DIASTOLIC BLOOD PRESSURE: 51 MMHG | SYSTOLIC BLOOD PRESSURE: 129 MMHG

## 2019-11-14 VITALS — DIASTOLIC BLOOD PRESSURE: 47 MMHG | SYSTOLIC BLOOD PRESSURE: 106 MMHG

## 2019-11-14 VITALS — DIASTOLIC BLOOD PRESSURE: 50 MMHG | SYSTOLIC BLOOD PRESSURE: 108 MMHG

## 2019-11-14 VITALS — SYSTOLIC BLOOD PRESSURE: 113 MMHG | DIASTOLIC BLOOD PRESSURE: 57 MMHG

## 2019-11-14 VITALS — SYSTOLIC BLOOD PRESSURE: 113 MMHG | DIASTOLIC BLOOD PRESSURE: 48 MMHG

## 2019-11-14 VITALS — SYSTOLIC BLOOD PRESSURE: 105 MMHG | DIASTOLIC BLOOD PRESSURE: 57 MMHG

## 2019-11-14 VITALS — SYSTOLIC BLOOD PRESSURE: 100 MMHG | DIASTOLIC BLOOD PRESSURE: 56 MMHG

## 2019-11-14 VITALS — SYSTOLIC BLOOD PRESSURE: 87 MMHG | DIASTOLIC BLOOD PRESSURE: 53 MMHG

## 2019-11-14 VITALS — DIASTOLIC BLOOD PRESSURE: 54 MMHG | SYSTOLIC BLOOD PRESSURE: 109 MMHG

## 2019-11-14 VITALS — SYSTOLIC BLOOD PRESSURE: 102 MMHG | DIASTOLIC BLOOD PRESSURE: 64 MMHG

## 2019-11-14 VITALS — SYSTOLIC BLOOD PRESSURE: 91 MMHG | DIASTOLIC BLOOD PRESSURE: 45 MMHG

## 2019-11-14 VITALS — DIASTOLIC BLOOD PRESSURE: 53 MMHG | SYSTOLIC BLOOD PRESSURE: 101 MMHG

## 2019-11-14 VITALS — DIASTOLIC BLOOD PRESSURE: 51 MMHG | SYSTOLIC BLOOD PRESSURE: 112 MMHG

## 2019-11-14 VITALS — DIASTOLIC BLOOD PRESSURE: 51 MMHG | SYSTOLIC BLOOD PRESSURE: 104 MMHG

## 2019-11-14 VITALS — SYSTOLIC BLOOD PRESSURE: 104 MMHG | DIASTOLIC BLOOD PRESSURE: 53 MMHG

## 2019-11-14 VITALS — SYSTOLIC BLOOD PRESSURE: 98 MMHG | DIASTOLIC BLOOD PRESSURE: 52 MMHG

## 2019-11-14 VITALS — SYSTOLIC BLOOD PRESSURE: 88 MMHG | DIASTOLIC BLOOD PRESSURE: 48 MMHG

## 2019-11-14 VITALS — SYSTOLIC BLOOD PRESSURE: 126 MMHG | DIASTOLIC BLOOD PRESSURE: 48 MMHG

## 2019-11-14 LAB
ANION GAP SERPL CALC-SCNC: 7.8 MMOL/L (ref 8–16)
BASOPHILS NFR BLD AUTO: 0.3 % (ref 0–2)
BUN SERPL-MCNC: 29 MG/DL (ref 7–18)
CALCIUM SERPL-MCNC: 8.2 MG/DL (ref 8.5–10.1)
CHLORIDE SERPL-SCNC: 104 MMOL/L (ref 98–107)
CO2 SERPL-SCNC: 31.4 MMOL/L (ref 21–32)
CREAT SERPL-MCNC: 3.1 MG/DL (ref 0.6–1.3)
EOSINOPHIL NFR BLD: 3.9 % (ref 0–7)
ERYTHROCYTE [DISTWIDTH] IN BLOOD BY AUTOMATED COUNT: 18.7 % (ref 11.5–14.5)
GLUCOSE SERPL-MCNC: 81 MG/DL (ref 74–106)
HCT VFR BLD CALC: 30.2 % (ref 42–54)
HGB BLD-MCNC: 9.7 G/DL (ref 13.5–17.5)
IMM GRANULOCYTES NFR BLD: 0.3 % (ref 0–5)
LYMPHOCYTES NFR BLD AUTO: 8.3 % (ref 15–50)
MAGNESIUM SERPL-MCNC: 1.9 MG/DL (ref 1.8–2.4)
MCH RBC QN AUTO: 27.5 PG (ref 26–34)
MCHC RBC AUTO-ENTMCNC: 32.1 G/DL (ref 31–37)
MCV RBC: 85.6 FL (ref 80–100)
MONOCYTES NFR BLD: 9 % (ref 2–11)
NEUTROPHILS NFR BLD AUTO: 78.2 % (ref 40–80)
OSMOLALITY SERPL CALC.SUM OF ELEC: 282 MOSM/KG (ref 275–300)
PHOSPHATE SERPL-MCNC: 3 MG/DL (ref 2.5–4.9)
PLATELET # BLD: 457 10X3/UL (ref 130–400)
PMV BLD AUTO: 10.2 FL (ref 7.4–10.4)
POTASSIUM SERPL-SCNC: 4.2 MMOL/L (ref 3.5–5.1)
RBC # BLD AUTO: 3.53 10X6/UL (ref 4.2–6.1)
SODIUM SERPL-SCNC: 139 MMOL/L (ref 136–145)
VANCOMYCIN SERPL-MCNC: 27 UG/ML (ref 10–20)
WBC # BLD AUTO: 11.5 10X3/UL (ref 4.8–10.8)

## 2019-11-14 NOTE — NUR
Patient incontinent of bowel/bladder in bed, cleaned with linen change
performed. HS meds given without difficulty, repositioned for comfort. Denies
pain or further needs at this time, see manoj for details.

## 2019-11-14 NOTE — NUR
PT CLEANED AND COMPLETE LINEN CHANGE DONE AT THIS TIME, CALL LIGHT WITHIN
REACH, WILL CONTINUE TO OBSERVE.

## 2019-11-14 NOTE — NUR
IV lines changed per protocol, drink provided at request. No further needs at
this time, all VSS and will continue to monitor.

## 2019-11-14 NOTE — NUR
Nutrition Follow-up:
Pt reports improved appetite/PO intake. States he ate ~50% of breakfast this
AM and has been drinking Ensure. Noted pt now with stage 2 pressure injury to
sacrum.
Diet: Regular, Ensure TID
PO intake: 85% of meals yesterday, ~50% of breakfast this AM
Wt: 144# (down from 149.9# on 11/2)
Last BM: 11/14
Labs noted: BUN 29, Cre 3.1, GFR 21, K+ 4.2, PO4 3.0, Ca 8.2
Meds noted: Megace, Albumin, Micro K
-Continue current diet as tolerated.
-RD following.

## 2019-11-14 NOTE — NUR
PT UP TO CHAIR, PT TOLERATED WELL. MONITORS ON AND WORKING, VITALS STABLE. PT
ALERT AND ORIENTED AT THIS TIME BUT NEEDS CONSTANT REDIRECTING AND REMINDING
TO LEAVE OXYGEN ON. NO FURTHER CHANGES AT THIS TIME, SEE FLOW SHEET FOR
FURTHER DETAILS.

## 2019-11-14 NOTE — NUR
Reassessment completed per flowsheet, no changes noted from previous
assessment. S1/S2 noted NSR on telemetry, rythmic and regular. Breathing is
shallow on 3L via HFNC with O2 sat 94%, lung sounds clear bilateral upper and
mid with diminished lower. All pulses palpable with cap refill < 3 sec, skin
warm/dry. Repositioned for comfort, see flowsheet for details. All VSS and
will continue to monitor.

## 2019-11-14 NOTE — NUR
Patient laying in bed with eyes open, no s/s of distress at this time. Patient
incontinent of bladder in bed, cleaned with linen change performed. Denies
pain or other needs at this time, all VSS and will continue to monitor.

## 2019-11-14 NOTE — NUR
PT ASSISTED BACK INTO BED, MONITORS ON AND WORKING VITALS STABLE, CALL LIGHT
WITHIN REACH SEE FLOW SHEET FOR FURTHER DETAILS. WILL CONTINUE TO OBSERVE.

## 2019-11-14 NOTE — NUR
Patient resting in bed with eyes open, no s/s of distress at this time.
Repositioned for comfort, no further needs at this time and will continue to
monitor.

## 2019-11-14 NOTE — NUR
Received patient resting in bed with eyes closed, assessment completed per
flowsheet. Patient answers appropriately/follows instructions. S1/S2 noted NSR
on telemetry, rythmic and regular. Breathing is shallow on 3L via HFNC with O2
sat 94%, lung sounds clear bilateral upper and mid with diminished lower.
Abdomen is round/soft with bowel sounds active x4, non-tender. Patient
incontinent of bladder, cleaned with linen change performed. All pulses
palpable with cap refill < 3 sec, skin warm/dry. Denies pain or other needs at
this time, see flowsheet for details. All VSS and will continue to monitor.

## 2019-11-14 NOTE — NUR
SHIFT ASSESSMENT COMPLETED. PT CARE ASSUMED, MONITORS ON AND WORKING, VITALS
STABLE, MONITORS ON AND WORKING, NO SIGNS/SYMPTOMS OF PAIN OR DISCOMFORT NOTED
AT THIS TIME, CALL LIGHT WITHIN REACH, WILL CONTINUE TO OBSERVE.

## 2019-11-14 NOTE — NUR
PT SITTING UP IN CHAIR, MONITORS ON AND WORKING, VITALS STABLE CALL LIGHT
WITHIN REACH, WILL CONTINUE TO OBSERVE.

## 2019-11-14 NOTE — NUR
PT SITTING UP IN BED, MONITORS ON AND WORKING, VITALS STABLE. CALL LIGHT
WITHIN REACH, WILL CONTINUE TO OBSERVE.

## 2019-11-15 VITALS — SYSTOLIC BLOOD PRESSURE: 119 MMHG | DIASTOLIC BLOOD PRESSURE: 45 MMHG

## 2019-11-15 VITALS — DIASTOLIC BLOOD PRESSURE: 55 MMHG | SYSTOLIC BLOOD PRESSURE: 115 MMHG

## 2019-11-15 VITALS — DIASTOLIC BLOOD PRESSURE: 56 MMHG | SYSTOLIC BLOOD PRESSURE: 131 MMHG

## 2019-11-15 VITALS — SYSTOLIC BLOOD PRESSURE: 104 MMHG | DIASTOLIC BLOOD PRESSURE: 52 MMHG

## 2019-11-15 VITALS — SYSTOLIC BLOOD PRESSURE: 107 MMHG | DIASTOLIC BLOOD PRESSURE: 59 MMHG

## 2019-11-15 VITALS — DIASTOLIC BLOOD PRESSURE: 58 MMHG | SYSTOLIC BLOOD PRESSURE: 112 MMHG

## 2019-11-15 VITALS — SYSTOLIC BLOOD PRESSURE: 98 MMHG | DIASTOLIC BLOOD PRESSURE: 46 MMHG

## 2019-11-15 VITALS — SYSTOLIC BLOOD PRESSURE: 112 MMHG | DIASTOLIC BLOOD PRESSURE: 53 MMHG

## 2019-11-15 VITALS — SYSTOLIC BLOOD PRESSURE: 106 MMHG | DIASTOLIC BLOOD PRESSURE: 48 MMHG

## 2019-11-15 VITALS — SYSTOLIC BLOOD PRESSURE: 102 MMHG | DIASTOLIC BLOOD PRESSURE: 50 MMHG

## 2019-11-15 VITALS — SYSTOLIC BLOOD PRESSURE: 91 MMHG | DIASTOLIC BLOOD PRESSURE: 45 MMHG

## 2019-11-15 VITALS — SYSTOLIC BLOOD PRESSURE: 100 MMHG | DIASTOLIC BLOOD PRESSURE: 56 MMHG

## 2019-11-15 VITALS — SYSTOLIC BLOOD PRESSURE: 100 MMHG | DIASTOLIC BLOOD PRESSURE: 50 MMHG

## 2019-11-15 VITALS — DIASTOLIC BLOOD PRESSURE: 61 MMHG | SYSTOLIC BLOOD PRESSURE: 112 MMHG

## 2019-11-15 VITALS — DIASTOLIC BLOOD PRESSURE: 49 MMHG | SYSTOLIC BLOOD PRESSURE: 114 MMHG

## 2019-11-15 VITALS — SYSTOLIC BLOOD PRESSURE: 123 MMHG | DIASTOLIC BLOOD PRESSURE: 64 MMHG

## 2019-11-15 VITALS — DIASTOLIC BLOOD PRESSURE: 45 MMHG | SYSTOLIC BLOOD PRESSURE: 119 MMHG

## 2019-11-15 VITALS — SYSTOLIC BLOOD PRESSURE: 130 MMHG | DIASTOLIC BLOOD PRESSURE: 59 MMHG

## 2019-11-15 VITALS — DIASTOLIC BLOOD PRESSURE: 55 MMHG | SYSTOLIC BLOOD PRESSURE: 112 MMHG

## 2019-11-15 VITALS — DIASTOLIC BLOOD PRESSURE: 59 MMHG | SYSTOLIC BLOOD PRESSURE: 121 MMHG

## 2019-11-15 VITALS — SYSTOLIC BLOOD PRESSURE: 104 MMHG | DIASTOLIC BLOOD PRESSURE: 53 MMHG

## 2019-11-15 VITALS — DIASTOLIC BLOOD PRESSURE: 43 MMHG | SYSTOLIC BLOOD PRESSURE: 90 MMHG

## 2019-11-15 VITALS — DIASTOLIC BLOOD PRESSURE: 50 MMHG | SYSTOLIC BLOOD PRESSURE: 100 MMHG

## 2019-11-15 VITALS — SYSTOLIC BLOOD PRESSURE: 104 MMHG | DIASTOLIC BLOOD PRESSURE: 47 MMHG

## 2019-11-15 LAB
ALBUMIN SERPL-MCNC: 2.3 G/DL (ref 3.4–5)
ALP SERPL-CCNC: 98 U/L (ref 46–116)
ALT SERPL-CCNC: 20 U/L (ref 10–68)
ANION GAP SERPL CALC-SCNC: 10 MMOL/L (ref 8–16)
BASOPHILS NFR BLD AUTO: 0.3 % (ref 0–2)
BILIRUB SERPL-MCNC: 0.98 MG/DL (ref 0.2–1.3)
BUN SERPL-MCNC: 31 MG/DL (ref 7–18)
CALCIUM SERPL-MCNC: 8.2 MG/DL (ref 8.5–10.1)
CHLORIDE SERPL-SCNC: 104 MMOL/L (ref 98–107)
CO2 SERPL-SCNC: 29.6 MMOL/L (ref 21–32)
CREAT SERPL-MCNC: 3.2 MG/DL (ref 0.6–1.3)
EOSINOPHIL NFR BLD: 3.5 % (ref 0–7)
ERYTHROCYTE [DISTWIDTH] IN BLOOD BY AUTOMATED COUNT: 18.6 % (ref 11.5–14.5)
GLOBULIN SER-MCNC: 3.6 G/L
GLUCOSE SERPL-MCNC: 84 MG/DL (ref 74–106)
HCT VFR BLD CALC: 28.9 % (ref 42–54)
HGB BLD-MCNC: 9.2 G/DL (ref 13.5–17.5)
IMM GRANULOCYTES NFR BLD: 0.2 % (ref 0–5)
LYMPHOCYTES NFR BLD AUTO: 9.4 % (ref 15–50)
MAGNESIUM SERPL-MCNC: 2.1 MG/DL (ref 1.8–2.4)
MCH RBC QN AUTO: 27.2 PG (ref 26–34)
MCHC RBC AUTO-ENTMCNC: 31.8 G/DL (ref 31–37)
MCV RBC: 85.5 FL (ref 80–100)
MONOCYTES NFR BLD: 10 % (ref 2–11)
NEUTROPHILS NFR BLD AUTO: 76.6 % (ref 40–80)
OSMOLALITY SERPL CALC.SUM OF ELEC: 283 MOSM/KG (ref 275–300)
PHOSPHATE SERPL-MCNC: 3.5 MG/DL (ref 2.5–4.9)
PLATELET # BLD: 467 10X3/UL (ref 130–400)
PMV BLD AUTO: 9.7 FL (ref 7.4–10.4)
POTASSIUM SERPL-SCNC: 4.6 MMOL/L (ref 3.5–5.1)
PROT SERPL-MCNC: 5.9 G/DL (ref 6.4–8.2)
RBC # BLD AUTO: 3.38 10X6/UL (ref 4.2–6.1)
SODIUM SERPL-SCNC: 139 MMOL/L (ref 136–145)
VANCOMYCIN SERPL-MCNC: 22 UG/ML (ref 10–20)
WBC # BLD AUTO: 11 10X3/UL (ref 4.8–10.8)

## 2019-11-15 NOTE — NUR
Reassessment completed per manoj, no changes noted from previous
assessment. Patient answers appropriately/follows instructions, disorientation
to situation noted reorients with stated understanding. S1/S2 noted NSR on
telemetry, rythmic and regular. Breathing is shallow on 3L via HFNC with O2
sat 96%, lung sounds clear bilateral upper and mid with diminished lower. All
pulses palpable with cap refill < 3 sec, skin warm/dry. Denies pain or other
needs at this time, see flowsheet for details. All VSS and will continue to
monitor.

## 2019-11-15 NOTE — NUR
Patient reassessment complete at this time no changes noted vital signs stable
we will continue to monitor for changes

## 2019-11-15 NOTE — NUR
PT INC OF STOOL X 2 THIS AM. BATH AND LINENS CHANGED PRIOR TO BREAKFAST AND
AFTER BREAKFAST AS WELL DUE TO INC OF URINE AND LOOSE STOOL.

## 2019-11-15 NOTE — NUR
PT ASSESSMENT COMPLETED AT THIS TIME, NO CHANGES FROM NURSE REPORT, PT WAKES
TO NAME BUT IS CONFUSED TO TIME, PLACE, AND SITUATION. RESP EVEN AND NON
LABORED ATTHIS TIME, VSS, WILL MONITOR FOR CHANGES No

## 2019-11-15 NOTE — NUR
Patient given p.o. meds at this time no distress noted respirations even
nonlabored no acute distress noted vital signs stable we will continue to
monitor

## 2019-11-16 VITALS — SYSTOLIC BLOOD PRESSURE: 111 MMHG | DIASTOLIC BLOOD PRESSURE: 49 MMHG

## 2019-11-16 VITALS — DIASTOLIC BLOOD PRESSURE: 52 MMHG | SYSTOLIC BLOOD PRESSURE: 122 MMHG

## 2019-11-16 VITALS — SYSTOLIC BLOOD PRESSURE: 107 MMHG | DIASTOLIC BLOOD PRESSURE: 51 MMHG

## 2019-11-16 VITALS — DIASTOLIC BLOOD PRESSURE: 48 MMHG | SYSTOLIC BLOOD PRESSURE: 113 MMHG

## 2019-11-16 VITALS — SYSTOLIC BLOOD PRESSURE: 81 MMHG | DIASTOLIC BLOOD PRESSURE: 48 MMHG

## 2019-11-16 VITALS — DIASTOLIC BLOOD PRESSURE: 57 MMHG | SYSTOLIC BLOOD PRESSURE: 114 MMHG

## 2019-11-16 VITALS — DIASTOLIC BLOOD PRESSURE: 50 MMHG | SYSTOLIC BLOOD PRESSURE: 105 MMHG

## 2019-11-16 VITALS — DIASTOLIC BLOOD PRESSURE: 49 MMHG | SYSTOLIC BLOOD PRESSURE: 88 MMHG

## 2019-11-16 VITALS — SYSTOLIC BLOOD PRESSURE: 85 MMHG | DIASTOLIC BLOOD PRESSURE: 51 MMHG

## 2019-11-16 VITALS — DIASTOLIC BLOOD PRESSURE: 41 MMHG | SYSTOLIC BLOOD PRESSURE: 104 MMHG

## 2019-11-16 VITALS — SYSTOLIC BLOOD PRESSURE: 111 MMHG | DIASTOLIC BLOOD PRESSURE: 64 MMHG

## 2019-11-16 VITALS — SYSTOLIC BLOOD PRESSURE: 98 MMHG | DIASTOLIC BLOOD PRESSURE: 38 MMHG

## 2019-11-16 VITALS — DIASTOLIC BLOOD PRESSURE: 52 MMHG | SYSTOLIC BLOOD PRESSURE: 114 MMHG

## 2019-11-16 VITALS — DIASTOLIC BLOOD PRESSURE: 72 MMHG | SYSTOLIC BLOOD PRESSURE: 96 MMHG

## 2019-11-16 VITALS — SYSTOLIC BLOOD PRESSURE: 119 MMHG | DIASTOLIC BLOOD PRESSURE: 57 MMHG

## 2019-11-16 VITALS — DIASTOLIC BLOOD PRESSURE: 50 MMHG | SYSTOLIC BLOOD PRESSURE: 114 MMHG

## 2019-11-16 VITALS — DIASTOLIC BLOOD PRESSURE: 48 MMHG | SYSTOLIC BLOOD PRESSURE: 97 MMHG

## 2019-11-16 VITALS — DIASTOLIC BLOOD PRESSURE: 55 MMHG | SYSTOLIC BLOOD PRESSURE: 107 MMHG

## 2019-11-16 VITALS — DIASTOLIC BLOOD PRESSURE: 52 MMHG | SYSTOLIC BLOOD PRESSURE: 105 MMHG

## 2019-11-16 VITALS — SYSTOLIC BLOOD PRESSURE: 104 MMHG | DIASTOLIC BLOOD PRESSURE: 50 MMHG

## 2019-11-16 VITALS — DIASTOLIC BLOOD PRESSURE: 43 MMHG | SYSTOLIC BLOOD PRESSURE: 95 MMHG

## 2019-11-16 VITALS — SYSTOLIC BLOOD PRESSURE: 125 MMHG | DIASTOLIC BLOOD PRESSURE: 59 MMHG

## 2019-11-16 VITALS — SYSTOLIC BLOOD PRESSURE: 102 MMHG | DIASTOLIC BLOOD PRESSURE: 54 MMHG

## 2019-11-16 VITALS — DIASTOLIC BLOOD PRESSURE: 45 MMHG | SYSTOLIC BLOOD PRESSURE: 103 MMHG

## 2019-11-16 VITALS — DIASTOLIC BLOOD PRESSURE: 78 MMHG | SYSTOLIC BLOOD PRESSURE: 104 MMHG

## 2019-11-16 VITALS — DIASTOLIC BLOOD PRESSURE: 50 MMHG | SYSTOLIC BLOOD PRESSURE: 98 MMHG

## 2019-11-16 VITALS — SYSTOLIC BLOOD PRESSURE: 111 MMHG | DIASTOLIC BLOOD PRESSURE: 60 MMHG

## 2019-11-16 VITALS — SYSTOLIC BLOOD PRESSURE: 109 MMHG | DIASTOLIC BLOOD PRESSURE: 46 MMHG

## 2019-11-16 VITALS — SYSTOLIC BLOOD PRESSURE: 100 MMHG | DIASTOLIC BLOOD PRESSURE: 49 MMHG

## 2019-11-16 LAB
ANION GAP SERPL CALC-SCNC: 10 MMOL/L (ref 8–16)
BASOPHILS NFR BLD AUTO: 0.1 % (ref 0–2)
BUN SERPL-MCNC: 27 MG/DL (ref 7–18)
CALCIUM SERPL-MCNC: 8.5 MG/DL (ref 8.5–10.1)
CHLORIDE SERPL-SCNC: 105 MMOL/L (ref 98–107)
CO2 SERPL-SCNC: 28.3 MMOL/L (ref 21–32)
CREAT SERPL-MCNC: 3.2 MG/DL (ref 0.6–1.3)
EOSINOPHIL NFR BLD: 2.5 % (ref 0–7)
ERYTHROCYTE [DISTWIDTH] IN BLOOD BY AUTOMATED COUNT: 18.6 % (ref 11.5–14.5)
GLUCOSE SERPL-MCNC: 83 MG/DL (ref 74–106)
HCT VFR BLD CALC: 28.6 % (ref 42–54)
HGB BLD-MCNC: 9 G/DL (ref 13.5–17.5)
IMM GRANULOCYTES NFR BLD: 0.3 % (ref 0–5)
LYMPHOCYTES NFR BLD AUTO: 9 % (ref 15–50)
MCH RBC QN AUTO: 27.3 PG (ref 26–34)
MCHC RBC AUTO-ENTMCNC: 31.5 G/DL (ref 31–37)
MCV RBC: 86.7 FL (ref 80–100)
MONOCYTES NFR BLD: 10.1 % (ref 2–11)
NEUTROPHILS NFR BLD AUTO: 78 % (ref 40–80)
OSMOLALITY SERPL CALC.SUM OF ELEC: 281 MOSM/KG (ref 275–300)
PLATELET # BLD: 474 10X3/UL (ref 130–400)
PMV BLD AUTO: 9.3 FL (ref 7.4–10.4)
POTASSIUM SERPL-SCNC: 4.3 MMOL/L (ref 3.5–5.1)
RBC # BLD AUTO: 3.3 10X6/UL (ref 4.2–6.1)
SODIUM SERPL-SCNC: 139 MMOL/L (ref 136–145)
WBC # BLD AUTO: 10.1 10X3/UL (ref 4.8–10.8)

## 2019-11-16 NOTE — NUR
Patient given CHG bath this a.m. and completely unchanged patient tolerated
well no distress noted vital signs stable

## 2019-11-16 NOTE — NUR
PM MEDS TAKEN WITHOUT DIFFICULTY. REPOSITIONED FOR COMFORT. IS USE X10,
500-750 ML PULLED, GOOD EFFORT. FLUTTER VALVE X10. CALL LIGHT IN REACH,
REFRESHMENTS AT BEDSIDE. NO FURTHER NEEDS AT THIS TIME. SCDS ON AND
FUNCTIONING. WILL CONT WITH POC.

## 2019-11-16 NOTE — NUR
PT TAKES O2 OFF AND SPO2 BEGINS TO DROP TO 88% AFTER A FEW MIN. REPLACED O2 AT
3L AND REDIRECTED TO KEEP O2 ON.

## 2019-11-16 NOTE — NUR
Patient reassessment completed this time no changes noted no distress noted
vital signs stable we will continue to monitor for changes

## 2019-11-16 NOTE — NUR
PATIENT GIVEN A HCG THAT THIS MORNING AND AND COMPLETE LINEN PATIENT
TOLERATED WELL NO DISTRESS NOTED VITAL SIGNS STABLE

## 2019-11-16 NOTE — NUR
REASSESSMENT COMPLETE. PT STATES THAT HE IS HAVING 6/10 LLQ ABD PAIN. PRN PAIN
MEDS ADMIN PER REQUEST. INCONTINENT, CONCENTRATED YELLOW URINE NOTED, COMPLETE
LINEN CHANGE PROVIDED. STAGE II PRESSURE ULCER ON COCCYX. BARRIER CREAM
APPLIED. PT ABLE TO ASSIST WITH TURNING. REPOSITIONED FOR COMFORT. RT AT
BEDSIDE. BIPAP ON, 14/7 @ 30%. NO FURTHER CHANGES AT THIS TIME. WILL CONT TO
MONITOR CLOSELY.

## 2019-11-16 NOTE — NUR
PT ATTEMPTING TO REPOSITION SELF IN CHAIR CONSTANTLY. C/O BACK PAIN. PO PAIN
MED GIVEN. ASSISTED PT WITH REPOSITIONING FOR COMFORT.

## 2019-11-16 NOTE — NUR
Patient resting with eyes closed respirations even nonlabored BiPAP in place
vital signs stable no distress noted we will continue to monitor for changes

## 2019-11-16 NOTE — NUR
SHIFT ASSESSMENT COMPLETE. PT IS A&O X3, CONFUSED TO TIME, REORIENTS EASILY.
HE DENIES ANY PAIN OR DISCOMFORT. PERRLA, 3 MM, BRISK REACTION TO LIGHT.
STRONG AND EQUAL HAND  AND FOOT PUMPS. EDENTULOUS, MOIST ORAL MUCOSA. RR
SHALLOW, NC ON @ 4L/MIN. CLEAR BREATH SOUNDS HEARD BILAT THROUGHOUT ALL LOBES.
S1S2 AUDIBLE, HR 85 BPM NSR SHOWING ON MONITOR. ABD SOFT, NONTENDER TO TOUCH,
BS ACTIVE X4. PT ABLE TO VOID IN URINAL, 75 ML CONCENTRATED YELLOW URINE. L
UPPER ARM PICC INFUSING DOBUTAMINE @ 7.5 MCG/KG/MIN (15.8 ML/HR) AND NS @ 10
ML/HR. GENERALIZED BRUISING NOTED. 1ST STEP AIR OVERLAY MATTRESS, COCCYX
REDDENED, REPOSITIONED FOR COMFORT. RUE DEPENDENT EDEMA, TRACE SWELLING ON
LOWER EXT. ULTRA SOUND AT BEDSIDE. PT TOLERATING WELL. NO FURTHER FINDINGS AT
THIS TIME. SEE FLOWSHEET FOR FURTHER DETAILS. TIGHT PARAMETERS SET ON CVICU
MONITORS.

## 2019-11-17 VITALS — DIASTOLIC BLOOD PRESSURE: 59 MMHG | SYSTOLIC BLOOD PRESSURE: 132 MMHG

## 2019-11-17 VITALS — SYSTOLIC BLOOD PRESSURE: 103 MMHG | DIASTOLIC BLOOD PRESSURE: 48 MMHG

## 2019-11-17 VITALS — DIASTOLIC BLOOD PRESSURE: 50 MMHG | SYSTOLIC BLOOD PRESSURE: 94 MMHG

## 2019-11-17 VITALS — SYSTOLIC BLOOD PRESSURE: 106 MMHG | DIASTOLIC BLOOD PRESSURE: 53 MMHG

## 2019-11-17 VITALS — DIASTOLIC BLOOD PRESSURE: 60 MMHG | SYSTOLIC BLOOD PRESSURE: 117 MMHG

## 2019-11-17 VITALS — SYSTOLIC BLOOD PRESSURE: 107 MMHG | DIASTOLIC BLOOD PRESSURE: 57 MMHG

## 2019-11-17 VITALS — DIASTOLIC BLOOD PRESSURE: 56 MMHG | SYSTOLIC BLOOD PRESSURE: 79 MMHG

## 2019-11-17 VITALS — SYSTOLIC BLOOD PRESSURE: 107 MMHG | DIASTOLIC BLOOD PRESSURE: 59 MMHG

## 2019-11-17 VITALS — DIASTOLIC BLOOD PRESSURE: 48 MMHG | SYSTOLIC BLOOD PRESSURE: 97 MMHG

## 2019-11-17 VITALS — SYSTOLIC BLOOD PRESSURE: 104 MMHG | DIASTOLIC BLOOD PRESSURE: 50 MMHG

## 2019-11-17 VITALS — SYSTOLIC BLOOD PRESSURE: 93 MMHG | DIASTOLIC BLOOD PRESSURE: 45 MMHG

## 2019-11-17 VITALS — DIASTOLIC BLOOD PRESSURE: 47 MMHG | SYSTOLIC BLOOD PRESSURE: 77 MMHG

## 2019-11-17 VITALS — DIASTOLIC BLOOD PRESSURE: 52 MMHG | SYSTOLIC BLOOD PRESSURE: 105 MMHG

## 2019-11-17 VITALS — SYSTOLIC BLOOD PRESSURE: 126 MMHG | DIASTOLIC BLOOD PRESSURE: 54 MMHG

## 2019-11-17 VITALS — SYSTOLIC BLOOD PRESSURE: 112 MMHG | DIASTOLIC BLOOD PRESSURE: 49 MMHG

## 2019-11-17 VITALS — DIASTOLIC BLOOD PRESSURE: 49 MMHG | SYSTOLIC BLOOD PRESSURE: 112 MMHG

## 2019-11-17 VITALS — SYSTOLIC BLOOD PRESSURE: 99 MMHG | DIASTOLIC BLOOD PRESSURE: 55 MMHG

## 2019-11-17 VITALS — DIASTOLIC BLOOD PRESSURE: 54 MMHG | SYSTOLIC BLOOD PRESSURE: 103 MMHG

## 2019-11-17 VITALS — SYSTOLIC BLOOD PRESSURE: 98 MMHG | DIASTOLIC BLOOD PRESSURE: 48 MMHG

## 2019-11-17 VITALS — DIASTOLIC BLOOD PRESSURE: 59 MMHG | SYSTOLIC BLOOD PRESSURE: 109 MMHG

## 2019-11-17 VITALS — DIASTOLIC BLOOD PRESSURE: 48 MMHG | SYSTOLIC BLOOD PRESSURE: 112 MMHG

## 2019-11-17 VITALS — SYSTOLIC BLOOD PRESSURE: 114 MMHG | DIASTOLIC BLOOD PRESSURE: 58 MMHG

## 2019-11-17 VITALS — SYSTOLIC BLOOD PRESSURE: 100 MMHG | DIASTOLIC BLOOD PRESSURE: 50 MMHG

## 2019-11-17 VITALS — DIASTOLIC BLOOD PRESSURE: 53 MMHG | SYSTOLIC BLOOD PRESSURE: 112 MMHG

## 2019-11-17 VITALS — SYSTOLIC BLOOD PRESSURE: 117 MMHG | DIASTOLIC BLOOD PRESSURE: 53 MMHG

## 2019-11-17 LAB
ANION GAP SERPL CALC-SCNC: 10.2 MMOL/L (ref 8–16)
BASOPHILS NFR BLD AUTO: 0.2 % (ref 0–2)
BUN SERPL-MCNC: 29 MG/DL (ref 7–18)
CALCIUM SERPL-MCNC: 8.1 MG/DL (ref 8.5–10.1)
CHLORIDE SERPL-SCNC: 106 MMOL/L (ref 98–107)
CO2 SERPL-SCNC: 28.4 MMOL/L (ref 21–32)
CREAT SERPL-MCNC: 3.3 MG/DL (ref 0.6–1.3)
CREATININE - URINE: 82.7 MG/DL (ref 30–125)
EOSINOPHIL NFR BLD: 4.1 % (ref 0–7)
ERYTHROCYTE [DISTWIDTH] IN BLOOD BY AUTOMATED COUNT: 18.5 % (ref 11.5–14.5)
GLUCOSE SERPL-MCNC: 85 MG/DL (ref 74–106)
HCT VFR BLD CALC: 27.4 % (ref 42–54)
HGB BLD-MCNC: 8.6 G/DL (ref 13.5–17.5)
IMM GRANULOCYTES NFR BLD: 0.3 % (ref 0–5)
LYMPHOCYTES NFR BLD AUTO: 10.1 % (ref 15–50)
MAGNESIUM SERPL-MCNC: 1.3 MG/DL (ref 1.8–2.4)
MCH RBC QN AUTO: 27.1 PG (ref 26–34)
MCHC RBC AUTO-ENTMCNC: 31.4 G/DL (ref 31–37)
MCV RBC: 86.4 FL (ref 80–100)
MONOCYTES NFR BLD: 9.4 % (ref 2–11)
NEUTROPHILS NFR BLD AUTO: 75.9 % (ref 40–80)
OSMOLALITY SERPL CALC.SUM OF ELEC: 283 MOSM/KG (ref 275–300)
PHOSPHATE SERPL-MCNC: 3.7 MG/DL (ref 2.5–4.9)
PLATELET # BLD: 488 10X3/UL (ref 130–400)
PMV BLD AUTO: 9.4 FL (ref 7.4–10.4)
POTASSIUM SERPL-SCNC: 4.6 MMOL/L (ref 3.5–5.1)
PROT UR-MCNC: 70.5 MG/DL (ref 0–11.9)
PROT/CREAT UR: 0.9 MG/G
RBC # BLD AUTO: 3.17 10X6/UL (ref 4.2–6.1)
SODIUM SERPL-SCNC: 140 MMOL/L (ref 136–145)
SODIUM UR-SCNC: 50 MMOL/L (ref 20–110)
WBC # BLD AUTO: 8.7 10X3/UL (ref 4.8–10.8)

## 2019-11-17 NOTE — NUR
DR HOLM HERE ON ROUNDS. TITRATED O2 TO 3LNC. PT ASKING TO GO BACK TO BED.
ASSISTED WITH REPOSITIONING WITH COMFORT. ENCOURAGED TO STAY OOB.

## 2019-11-17 NOTE — NUR
REASSESSMENT COMPLETE PER FLOW SHEET, NO ACUTE CHANGES FROM PRIOR ASSESSMENT,
PT PLACED ON NC @ 3L/MIN, SMALL CUP ICE WATER GIVEN PER REQUEST, REPOSITIONED
IN BED FOR COMFORT PILLOW PLACED UNDER LEFT SIDE, BLOOD DRAWN FOR MAG RECHECK
PER ORDERS AND SENT TO LAB, VSS, NSR ON CM, CALL LIGHT IN REACH, BED ALARM ON,
WILL CONTINUE TO MONITOR

## 2019-11-17 NOTE — NUR
CHG BATH AND COMPLETE LINEN CHANGE PROVIDED. BIPAP REMOVED, 4 L/MIN VIA NC ON.
PT TOLERATED WELL. NO FURTHER NEEDS.

## 2019-11-17 NOTE — NUR
PT RESTING PEACEFULLY WITH NO SIGNS OF ACUTE DISTRESS NOTED. VSS. CALL LIGHT
IN REACH, WILL CONT WITH POC.

## 2019-11-17 NOTE — NUR
REASSESSMENT COMPLETE. NO CHANGES FROM PREVIOUS ASSESSMENT. SEE FLOWSHEET FOR
FURTHER DETAILS. REPOSITIONED FOR COMFORT. CHANGED DOBUTAMINE TUBING, SWAB
CAPS IN PLACE, TUBING LABELED. NO FURTHER FINDINGS AT THIS TIME. VSS. CALL
LIGHT IN REACH. WILL CONT TO MONITOR CLOSELY.

## 2019-11-17 NOTE — NUR
CALL LIGHT PRESSED BY PT, STATED FEET ITCH, REMOVED SOCKS ITCHED FEET, CLEANED
FEET WITH SOAP AND WATER, REPLACED NON-SLIP SOCKS.
PT STATED HE FELT NAUSEATED, PRN ZOFRAN GIVEN PER MAR/ORDERS, VSS, WILL
CONTINUE TO MONITOR

## 2019-11-17 NOTE — NUR
UP OOB TO CHAIR. BREAKFAST TRAY SERVED AND MEAL TRAY SET UP. PT FEEDS SELF
WITH OUT DIFFICULTY. CHAIR ALARM ON. CALL LIGHT AT BS.

## 2019-11-17 NOTE — NUR
PT INCONTINENT OF BLADDER AND BOWELS, LOOSE MARTINEZ/BROWN BM, PARTIAL BATH AND
COMPLETE LINEN CHANGE, YELLOW GOWN REPLACED, VSS, REPOSITIONED IN BED FOR
COMFORT, WILL CONTINUE TO MONITOR

## 2019-11-18 VITALS — DIASTOLIC BLOOD PRESSURE: 55 MMHG | SYSTOLIC BLOOD PRESSURE: 118 MMHG

## 2019-11-18 VITALS — SYSTOLIC BLOOD PRESSURE: 105 MMHG | DIASTOLIC BLOOD PRESSURE: 52 MMHG

## 2019-11-18 VITALS — DIASTOLIC BLOOD PRESSURE: 51 MMHG | SYSTOLIC BLOOD PRESSURE: 108 MMHG

## 2019-11-18 VITALS — SYSTOLIC BLOOD PRESSURE: 124 MMHG | DIASTOLIC BLOOD PRESSURE: 54 MMHG

## 2019-11-18 VITALS — SYSTOLIC BLOOD PRESSURE: 118 MMHG | DIASTOLIC BLOOD PRESSURE: 54 MMHG

## 2019-11-18 VITALS — DIASTOLIC BLOOD PRESSURE: 58 MMHG | SYSTOLIC BLOOD PRESSURE: 103 MMHG

## 2019-11-18 VITALS — SYSTOLIC BLOOD PRESSURE: 124 MMHG | DIASTOLIC BLOOD PRESSURE: 53 MMHG

## 2019-11-18 VITALS — SYSTOLIC BLOOD PRESSURE: 108 MMHG | DIASTOLIC BLOOD PRESSURE: 51 MMHG

## 2019-11-18 VITALS — DIASTOLIC BLOOD PRESSURE: 61 MMHG | SYSTOLIC BLOOD PRESSURE: 103 MMHG

## 2019-11-18 VITALS — SYSTOLIC BLOOD PRESSURE: 92 MMHG | DIASTOLIC BLOOD PRESSURE: 42 MMHG

## 2019-11-18 VITALS — SYSTOLIC BLOOD PRESSURE: 106 MMHG | DIASTOLIC BLOOD PRESSURE: 51 MMHG

## 2019-11-18 VITALS — SYSTOLIC BLOOD PRESSURE: 94 MMHG | DIASTOLIC BLOOD PRESSURE: 64 MMHG

## 2019-11-18 VITALS — DIASTOLIC BLOOD PRESSURE: 58 MMHG | SYSTOLIC BLOOD PRESSURE: 115 MMHG

## 2019-11-18 VITALS — DIASTOLIC BLOOD PRESSURE: 60 MMHG | SYSTOLIC BLOOD PRESSURE: 110 MMHG

## 2019-11-18 VITALS — DIASTOLIC BLOOD PRESSURE: 52 MMHG | SYSTOLIC BLOOD PRESSURE: 98 MMHG

## 2019-11-18 VITALS — DIASTOLIC BLOOD PRESSURE: 53 MMHG | SYSTOLIC BLOOD PRESSURE: 104 MMHG

## 2019-11-18 VITALS — SYSTOLIC BLOOD PRESSURE: 121 MMHG | DIASTOLIC BLOOD PRESSURE: 52 MMHG

## 2019-11-18 VITALS — SYSTOLIC BLOOD PRESSURE: 104 MMHG | DIASTOLIC BLOOD PRESSURE: 56 MMHG

## 2019-11-18 VITALS — DIASTOLIC BLOOD PRESSURE: 53 MMHG | SYSTOLIC BLOOD PRESSURE: 108 MMHG

## 2019-11-18 VITALS — DIASTOLIC BLOOD PRESSURE: 77 MMHG | SYSTOLIC BLOOD PRESSURE: 121 MMHG

## 2019-11-18 LAB
ANION GAP SERPL CALC-SCNC: 6.4 MMOL/L (ref 8–16)
BASOPHILS NFR BLD AUTO: 0.2 % (ref 0–2)
BUN SERPL-MCNC: 32 MG/DL (ref 7–18)
CALCIUM SERPL-MCNC: 8 MG/DL (ref 8.5–10.1)
CHLORIDE SERPL-SCNC: 107 MMOL/L (ref 98–107)
CO2 SERPL-SCNC: 31.3 MMOL/L (ref 21–32)
CREAT SERPL-MCNC: 3.3 MG/DL (ref 0.6–1.3)
EOSINOPHIL NFR BLD: 4.1 % (ref 0–7)
ERYTHROCYTE [DISTWIDTH] IN BLOOD BY AUTOMATED COUNT: 18.7 % (ref 11.5–14.5)
GLUCOSE SERPL-MCNC: 84 MG/DL (ref 74–106)
HCT VFR BLD CALC: 26.4 % (ref 42–54)
HGB BLD-MCNC: 8.3 G/DL (ref 13.5–17.5)
IMM GRANULOCYTES NFR BLD: 0.3 % (ref 0–5)
LYMPHOCYTES NFR BLD AUTO: 8.9 % (ref 15–50)
MAGNESIUM SERPL-MCNC: 2.3 MG/DL (ref 1.8–2.4)
MCH RBC QN AUTO: 27.1 PG (ref 26–34)
MCHC RBC AUTO-ENTMCNC: 31.4 G/DL (ref 31–37)
MCV RBC: 86.3 FL (ref 80–100)
MONOCYTES NFR BLD: 10 % (ref 2–11)
NEUTROPHILS NFR BLD AUTO: 76.5 % (ref 40–80)
OSMOLALITY SERPL CALC.SUM OF ELEC: 284 MOSM/KG (ref 275–300)
PHOSPHATE SERPL-MCNC: 3.2 MG/DL (ref 2.5–4.9)
PLATELET # BLD: 500 10X3/UL (ref 130–400)
PMV BLD AUTO: 9.3 FL (ref 7.4–10.4)
POTASSIUM SERPL-SCNC: 4.7 MMOL/L (ref 3.5–5.1)
RBC # BLD AUTO: 3.06 10X6/UL (ref 4.2–6.1)
SODIUM SERPL-SCNC: 140 MMOL/L (ref 136–145)
WBC # BLD AUTO: 8.6 10X3/UL (ref 4.8–10.8)

## 2019-11-18 NOTE — NUR
Never sleep in CL. Rehab Prescreening Consult recieved and the chart has been reviewed.  He is
[employer] managed Medicare and will require a preauth.  Information will be sent
to them for their review.  Discussed with the HIGINIO Haile.
Halle Javier RN
Clinical Liaison, Rehab

## 2019-11-18 NOTE — NUR
PT REPORT RECEIVED FROM DAY SHIFT NURSE. SHIFT ASSESSMENT COMPLETED AT THIS
TIME. PT CURRENTLY ON BIPAP. HAS ORDERS FOR TRANSFER. VSS. WILL CONTINUE TO
MONITOR

## 2019-11-18 NOTE — NUR
PT SITTING UP IN CHAIR AT BEDSIDE. VSS AND WNL. CALL LIGHT WITHIN REACH. CHAIR
ALARM ON AND TESTED. DENIES ANY NEEDS AT THIS TIME, WILL CONT TO FOLLOW POC

## 2019-11-18 NOTE — NUR
Nutrition Follow-up:
RN reports pt not eating but drinking some Ensure.
Diet: Regular, Mechanical Soft, Ensure TID
PO intake: 30% avg x 6 meals
Wt: 147.7#
Last BM: 11/18
Labs noted: K+ 4.7, PO4 3.2, Ca 8.0
Meds noted: Megace, Albumin
-Pt may require alternate nutrition 2/2 poor PO intake.
-RD following.

## 2019-11-18 NOTE — NUR
REASSESSMENT COMPLETE SEE FLOW SHEET FOR FURTHER, NO ACUTE DISTRESS OR CHANGES
NOTED FROM PRIOR ASSESSMENT, PT PLACED ON BIPAP@ 30% FiO2, PT INCONTINENT OF
VOID, CHG BATH AND COMPLETE LINEN CHANGE COMPLETED, BREAUX GOWN PLACED ON PT D/T
NO YELLOW LINEN, PT TOLLERATED BATH WITH NO S/S OF DISTRESS, REPOSITIONED IN
BED FOER COMFORT, HOB AND HEELS ELEVATED, BED ALARM ON, CALL LIGHT IN REACH,
VSS, WILL CONTINUE TO MONITOR

## 2019-11-18 NOTE — NUR
CALL LIGHT ANSWERED, PT C/O GENERALIZED ACHING PAIN 7/10 ON NUMERIC PAIN
SCALE, REPOSITIONED PT UP IN BED FOR COMFORT, PAIN MED GIVEN PER MAR/ORDERS,
VSS, WILL CONTINUE TO MONITOR, CALL LIGHT IN REACH

## 2019-11-18 NOTE — NUR
PT RESTING IN BED WITH BIPAP ON. VSS AND WNL. CALL LIGHT WITHIN REACH. BED
ALARM ON. DENIES ANY NEEDS AT THIS TIME, WILL CONT TO FOLLOW POC

## 2019-11-18 NOTE — NUR
PT UP WALKING AROUND HIS ROOM. NONSKID SOCKS AND BRANDON HOSE IN PLACE. DENIES ANY
NEEDS AT THIS TIME. WILL CONT TO FOLLOW POC

## 2019-11-18 NOTE — NUR
PHYSICAL THERAPY HERE AND WALKED WITH PT IN THE [place]. PT PLACED INTO CHAIR AT
BEDSIDE WITH CHAIR ALARM ON. CALL LIGHT WITHIN REACH, DENIES ANY NEEDS AT THIS
TIME, VSS AND WNL, WILL CONT TO FOLLOW POC

## 2019-11-18 NOTE — NUR
PT PRESSED CALL LIGHT, RN AT BEDSIDE, PT REQUESTED TO BE MOVED UP IN BED
 AFTER PT REPOSITIONED SELF DOWN IN BED, REPOSITIONED PT UP IN
BED, PLACED PILLOW UNDER LEFT SIDE, CALL LIGHT IN REACH, BED ALARM ON, NO
FURTHER NEEDS AT THIS TIME, WILL CONTINUE TO MONITOR

## 2019-11-18 NOTE — NUR
PT SITTING UP IN HIS CHAIR. SHIFT ASSESSMENT PERFORMED. VSS AND WNL. DENIES
ANY NEEDS AT THIS TIME, CALL LIGHT WITHIN REACH. WILL CONT TO FOLLOW POC

## 2019-11-18 NOTE — NUR
PT RESTING IN BED. VSS AND WNL. BIPAP REMOVED AND PT PLACED ON 3L NC. SHIFT
ASSESSMENT PERFORMED. CALL LIGHT AND URINAL WITHIN REACH. BED ALARM ON AND
TESTED. DENIES ANY FURTHER NEEDS AT THIS TIME, WILL CONT TO FOLLOW POC

## 2019-11-18 NOTE — NUR
PT RESTING IN BED. BIPAP STILL IN USE. PT TOLERATING WELL. NO VISIBLE SIGNS OF
DISTRESS NOTED. VSS. WILL CONTINUE TO MONITOR

## 2019-11-18 NOTE — NUR
HERE AND GAVE ORDERS TO DECREASE DOBUTAMINE GTT TO 5MCG/KG/HR.
 STATES HE IS OK WITH PT TRANFERRING TO THE FLOOR.

## 2019-11-18 NOTE — NUR
RESP THERAPY HERE AND PLACED PT ON BIPAP AS ORDERED. VSS AND WNL, PT RESTING
IN BED. CALL LIGHT WITHIN REACH. DENIES ANY NEEDS AT THIS TIME, BED ALARM ON
AND TESTED. WILL CONT TO FOLLOW POC

## 2019-11-18 NOTE — NUR
CALL LIGHT ANSWERED, PT C/O SORE THROAT D/T BIPAP, BIPAP REMOVED PT PLACED ON
NC @ 3L/MIN, ICE WATER GIVEN PER REQUEST, PT STATED THROAT FELT BETTER AFTER
DRINK, REPOSITIONED PT UP IN BED FOR COMFORT, HOB ELEVATED, PT PLACED BACK ON
BIPAP @30% FiO2, DENIES OTHER NEEDS AT THIS TIME, WILL CONTINUE TO MONITOR

## 2019-11-18 NOTE — NUR
ASSISTED PT BACK INTO BED, CALL LIGHT WITHIN REACH, VSS AND WNL, DENIES ANY
NEEDS AT THIS TIME. WILL CONT TO FOLLOW POC

## 2019-11-18 NOTE — NUR
PT GIVEN PM MEDS. TOLERATED TAKING BIPAP OFF FOR 10 MINUTES BEFORE DESATTING.
PT PLACED BACK ON BIPAP. OXYGEN LEVEL REBOUNDED BACK TO NORMAL LEVEL. VSS.
WILL CONTINUE TO MONITOR

## 2019-11-18 NOTE — NUR
CALL LIGHT PRESSED, PT REQUESTING BIPAP TAKEN OFF, PLACED PT ON N/C @3L/MIN,
VSS, WILL CONTINUE TO MONITOR

## 2019-11-18 NOTE — NUR
CHG BATH AND FULL LINEN CHANGE PROVIDED. VSS AND WNL. CALL LIGHT WITHIN REACH.
DENIES ANY NEEDS AT THIS TIME. BED ALARM ON AND TESTED. WILL CONT TO FOLLOW
POC

## 2019-11-18 NOTE — MORECARE
CASE MANAGEMENT DISCHARGE SUMMARY
 
 
PATIENT: BARRY CARR               UNIT: [id number]
ACCOUNT#: [id number]                       ADM DATE: 10/28/19
AGE: 74     : 41  SEX: M            ROOM/BED: [facility]    
AUTHOR: TANK SHINE                             PHYSICIAN:                               
 
REFERRING PHYSICIAN: AMARI DUARTE DO            
DATE OF SERVICE: 19
Discharge Plan
 
 
Patient Name: BARRY CARR
Facility: [facility]:[place]
Encounter #: [id number]
Medical Record #: [id number]
: 1945
Planned Disposition: Home with Home Health
Anticipated Discharge Date: 
 
Discharge Date: 
Expected LOS: 
Initial Reviewer: [email or web address]
Initial Review Date: 10/30/2019
Generated: 19   5:32 pm 
Comments
 
DCP- Discharge Planning
 
Updated by [email or web address]: Rozina Rivers on 19   3:31 pm CT
CM spoke with daughter Luna and daughter Luna regarding patient and 
discharge planning.  CM explained that patient at this point will not be able 
to discharge home alone even with Home Health d/t it not being a safe 
discharge.  CM explained that the MD had placed an order for rehab prescreen 
and once patient is stable and if insurance approves inpatient rehab then he 
could possibly go there. Both daughters agree to this plan. CM also explained 
that if insurance doesn't authorize inpatient rehab then then they will most 
likely suggest Skilled Nursing Facility for rehab.  Both daughters state that 
he will never agree to that.  CM received an updated number Luna 
[phone] until Wednesday when she can get her phone reactivated. Luna  
[phone].  CM updated daughter' s that patient will most likely transfer 
out to floor once a bed is available. CM will continue to follow and assist 
as needed with discharge planning needs.
DCP- Discharge Planning
 
Updated by [id number]: Rozina Rivers on 19  10:37 am CT
CM attempted to contact daughter Luna Carr [phone]. CM received a 
message number is not reachable. CM relayed message to nursing if daughter 
calls ask her to call CM.  CM will continue to follow and assist as needed 
 
with discharge planning / needs.
DCP- Discharge Planning
 
Updated by [id number]: Scott Sun on 10/30/19   4:56 pm CT
Patient Name: BARRY CARR                                     
Admission Status: ER   
Accout number: [id number]                              
Admission Date: 10-   
: 1945                                                        
Admission Diagnosis:   
Attending: AMARI DUARTE                                                
Current LOS:  2   
  
Anticipated DC Date:    
Planned Disposition: Home with Home Health   
Primary Insurance: [employer] CHOICE PPO [employer]   
PLANNED EXTERNAL PROVIDER:  [facility]  
  
Discharge Planning Comments:   
CM RECEIVED ORDER TO CONSULT WITH PT REGARDING DISCHARGE NEEDS.  CM MET WITH 
PT IN ROOM TO DISCUSS DISCHARGE PLANNING AND NEEDS. PT REPORTS LIVING AT HOME 
INDEPENDENTLY AND ALONE. PT HAS CANE AND WALKER WITH NO MEDICAL EQUIPMENT  
PROVIDER PREFERENCE.  PT HAD [facility] BUT IT HAD ALREADY STOPPED; 
PT HAS NO OUTSIDE SERVICES ASSISTING IN THE HOME. CM DISCUSSED AVAILABILITY 
OF HOME HEALTH, REHAB SERVICES AND MEDICAL EQUIPMENT. PT DENIES NEED OF 
INPATIENT OR SKILLED REHAB.  PT WANTS HOME HEALTH WITH [facility]; PT STATES HIS 
DAUGHTER WILL BE COOKING AND CLEANING FOR HIM AFTER DISCHARGE, BUT HE WILL 
STILL BE LIVING ALONE.  PT REPORTS HIS DAUGHTER WILL PICK HIM UP FOR 
DISCHARGE HOME. IMPORTANT MESSAGE FROM MEDICARE PROVIDED AND EXPLAINED.  
CHOICE FOR [facility] SIGNED.    
  
CM TO ARRANGE [facility] FOR DISCHARGE HOME WITH PHYSICIAN AGREEMENT 
AND ORDERS.  PCP IS DR. NEGRETE.  CM TO CONTINUE TO FOLLOW AND ASSIST AS NEEDED. 
  
: Scott Sun
 [facility] - Discharge Planning Initial Assessment
 
Updated by [email or web address]: Scott Sun on 10/30/19   4:52 pm
*  Is the patient Alert and Oriented?
Yes
*  How many steps to enter\exit or inside your home? NONE *  PCP DR. NEGRETE *  Pharmacy
[facility]  [facility]
 
*  Preadmission Environment
Home Alone
*  ADLs
Independent
*  Equipment
Cane
Rolling Walker
*  Other Equipment
NO MEDICAL EQUIPMENT PROVIDER PREFERENCE
 
*  List name and contact numbers for known caregivers / representatives who 
currently or will assist patient after discharge:
LUNA CARR, SARAH, [phone]
*  Verbal permission to speak to the caregivers and representatives has been 
obtained from the patient.
N/A
*  Community resources currently utilized
None
*  Please name any agencies selected above.
NONE
*  Additional services required to return to the preadmission environment?
No
*  Can the patient safely return to the preadmission environment?
Yes
*  Has this patient been hospitalized within the prior 30 days at any 
hospital?
No
 
 
 
 
 
Coverage Notice
 
Reviewer: [id number] Sun
 
Notice Issued Date-Time: 10/30/2019  16:40
Notice Type: IM Discharge Notice
 
Notice Delivered To: Patient
Relationship to Patient: 
Representative Name: 
 
Delivery Method: HAND - Hand Delivered
Bell Days:
Prior Verbal Notification: 
 
Recipient Understood Notice: Yes
Recipient Signature: Yes
Med Rec Note Co-signed by Attending:
 
Coverage Notice Comment:  
Reviewer: [id number] Sun
 
Notice Issued Date-Time: 10/30/2019  16:40
Notice Type: Patient Choice Letter
 
Notice Delivered To: Patient
Relationship to Patient: 
Representative Name: 
 
Delivery Method: HAND - Hand Delivered
 
Bell Days:
Prior Verbal Notification: 
 
Recipient Understood Notice: Yes
Recipient Signature: Yes
Med Rec Note Co-signed by Attending:
 
Coverage Notice Comment:  [facility]
 
Last DP export: 19  10:40 
Patient Name: BARRY CARR
Encounter #: [id number]
Page [id number]
 
 
 
 
 
Electronically Signed by TANK SHINE on 19 at 1632
 
 
 
 
 
 
**All edits/amendments must be made on the electronic document**
 
DICTATION DATE: 19 163     : DILMA  19 1632     
RPT#: [id number]                                DC DATE:        
                                               STATUS: ADM IN  
[facility]
[address] [place] [address]
***END OF REPORT***

## 2019-11-18 NOTE — MORECARE
CASE MANAGEMENT DISCHARGE SUMMARY
 
 
PATIENT: BARRY CARR               UNIT: [id number]
ACCOUNT#: [id number]                       ADM DATE: 10/28/19
AGE: 74     : 41  SEX: M            ROOM/BED: D.01    
AUTHOR: FÁTIMA,DOC                             PHYSICIAN:                               
 
REFERRING PHYSICIAN: AMARI DUARTE DO            
DATE OF SERVICE: 19
Discharge Plan
 
 
Patient Name: BARRY CARR
Facility: [facility]:[place]
Encounter #: [id number]
Medical Record #: [id number]
: 1945
Planned Disposition: Home with Home Health
Anticipated Discharge Date: 
 
Discharge Date: 
Expected LOS: 
Initial Reviewer: [id number]
Initial Review Date: 10/30/2019
Generated: 19  12:39 pm 
DCP- Discharge Planning
 
Updated by [id number]: Rozina Rivers on 19  10:37 am CT
CM attempted to contact daughter Luna Carr [phone]. CM received a 
message number is not reachable. CM relayed message to nursing if daughter 
calls ask her to call CM.  CM will continue to follow and assist as needed 
with discharge planning / needs.
DCP- Discharge Planning
 
Updated by [id number]: Scott Sun on 10/30/19   4:56 pm CT
Patient Name: BARRY CARR                                     
Admission Status: ER   
Accout number: [id number]                              
Admission Date: 10-   
: 1945                                                        
Admission Diagnosis:   
Attending: AMARI DUARTE                                                
Current LOS:  2   
  
Anticipated DC Date:    
Planned Disposition: Home with Home Health   
Primary Insurance: [employer] CHOICE PPO [facility]   
PLANNED EXTERNAL PROVIDER:  [facility]  
  
 
Discharge Planning Comments:   
CM RECEIVED ORDER TO CONSULT WITH PT REGARDING DISCHARGE NEEDS.  CM MET WITH 
PT IN ROOM TO DISCUSS DISCHARGE PLANNING AND NEEDS. PT REPORTS LIVING AT HOME 
INDEPENDENTLY AND ALONE. PT HAS CANE AND WALKER WITH NO MEDICAL EQUIPMENT  
PROVIDER PREFERENCE.  PT HAD [facility] BUT IT HAD ALREADY STOPPED; 
PT HAS NO OUTSIDE SERVICES ASSISTING IN THE HOME. CM DISCUSSED AVAILABILITY 
OF HOME HEALTH, REHAB SERVICES AND MEDICAL EQUIPMENT. PT DENIES NEED OF 
INPATIENT OR SKILLED REHAB.  PT WANTS HOME HEALTH WITH [facility]; PT STATES HIS 
DAUGHTER WILL BE COOKING AND CLEANING FOR HIM AFTER DISCHARGE, BUT HE WILL 
STILL BE LIVING ALONE.  PT REPORTS HIS DAUGHTER WILL PICK HIM UP FOR 
DISCHARGE HOME. IMPORTANT MESSAGE FROM MEDICARE PROVIDED AND EXPLAINED.  
CHOICE FOR [facility] SIGNED.    
  
CM TO ARRANGE [facility] FOR DISCHARGE HOME WITH PHYSICIAN AGREEMENT 
AND ORDERS.  PCP IS DR. NEGRETE.  CM TO CONTINUE TO FOLLOW AND ASSIST AS NEEDED. 
  
: Scott Mejiawell
 DCPIA - Discharge Planning Initial Assessment
 
Updated by [id number]: Scott Sun on 10/30/19   4:52 pm
*  Is the patient Alert and Oriented?
Yes
*  How many steps to enter\exit or inside your home? NONE *  PCP DR. NEGRETE *  Pharmacy
[facility]  SHORT TERM - [facility]
 
*  Preadmission Environment
Home Alone
*  ADLs
Independent
*  Equipment
Cane
Rolling Walker
*  Other Equipment
NO MEDICAL EQUIPMENT PROVIDER PREFERENCE
*  List name and contact numbers for known caregivers / representatives who 
currently or will assist patient after discharge:
NAE CARR PADILLA, [phone]
*  Verbal permission to speak to the caregivers and representatives has been 
obtained from the patient.
N/A
*  Community resources currently utilized
None
*  Please name any agencies selected above.
NONE
*  Additional services required to return to the preadmission environment?
No
*  Can the patient safely return to the preadmission environment?
Yes
*  Has this patient been hospitalized within the prior 30 days at any 
hospital?
No
 
 
 
 
 
 
Coverage Notice
 
Reviewer: [email or web address] Sun
 
Notice Issued Date-Time: 10/30/2019  16:40
Notice Type: IM Discharge Notice
 
Notice Delivered To: Patient
Relationship to Patient: 
Representative Name: 
 
Delivery Method: HAND - Hand Delivered
Bell Days:
Prior Verbal Notification: 
 
Recipient Understood Notice: Yes
Recipient Signature: Yes
Med Rec Note Co-signed by Attending:
 
Coverage Notice Comment:  
Reviewer: [email or web address] Jenny Sun
 
Notice Issued Date-Time: 10/30/2019  16:40
Notice Type: Patient Choice Letter
 
Notice Delivered To: Patient
Relationship to Patient: 
Representative Name: 
 
Delivery Method: HAND - Hand Delivered
Bell Days:
Prior Verbal Notification: 
 
Recipient Understood Notice: Yes
Recipient Signature: Yes
Med Rec Note Co-signed by Attending:
 
Coverage Notice Comment:  [facility]
 
Last DP export: 10/30/19   5:04 
Patient Name: BARRY CARR
 
Encounter #: [id number]
Page [id number]
 
 
 
 
 
Electronically Signed by [facility] on 19 at 1140
 
 
 
 
 
 
**All edits/amendments must be made on the electronic document**
 
DICTATION DATE: 19 113     : DILMA  19 113     
RPT#: [id number]                                DC DATE:        
                                               STATUS: ADM IN  
[facility]
 [place] [address]
***END OF REPORT***

## 2019-11-19 VITALS — SYSTOLIC BLOOD PRESSURE: 116 MMHG | DIASTOLIC BLOOD PRESSURE: 65 MMHG

## 2019-11-19 VITALS — DIASTOLIC BLOOD PRESSURE: 54 MMHG | SYSTOLIC BLOOD PRESSURE: 118 MMHG

## 2019-11-19 VITALS — SYSTOLIC BLOOD PRESSURE: 132 MMHG | DIASTOLIC BLOOD PRESSURE: 41 MMHG

## 2019-11-19 VITALS — DIASTOLIC BLOOD PRESSURE: 54 MMHG | SYSTOLIC BLOOD PRESSURE: 109 MMHG

## 2019-11-19 VITALS — SYSTOLIC BLOOD PRESSURE: 121 MMHG | DIASTOLIC BLOOD PRESSURE: 51 MMHG

## 2019-11-19 VITALS — DIASTOLIC BLOOD PRESSURE: 59 MMHG | SYSTOLIC BLOOD PRESSURE: 131 MMHG

## 2019-11-19 VITALS — SYSTOLIC BLOOD PRESSURE: 118 MMHG | DIASTOLIC BLOOD PRESSURE: 58 MMHG

## 2019-11-19 VITALS — SYSTOLIC BLOOD PRESSURE: 122 MMHG | DIASTOLIC BLOOD PRESSURE: 78 MMHG

## 2019-11-19 VITALS — DIASTOLIC BLOOD PRESSURE: 48 MMHG | SYSTOLIC BLOOD PRESSURE: 99 MMHG

## 2019-11-19 VITALS — DIASTOLIC BLOOD PRESSURE: 51 MMHG | SYSTOLIC BLOOD PRESSURE: 106 MMHG

## 2019-11-19 VITALS — SYSTOLIC BLOOD PRESSURE: 116 MMHG | DIASTOLIC BLOOD PRESSURE: 72 MMHG

## 2019-11-19 VITALS — DIASTOLIC BLOOD PRESSURE: 74 MMHG | SYSTOLIC BLOOD PRESSURE: 96 MMHG

## 2019-11-19 VITALS — SYSTOLIC BLOOD PRESSURE: 130 MMHG | DIASTOLIC BLOOD PRESSURE: 52 MMHG

## 2019-11-19 VITALS — DIASTOLIC BLOOD PRESSURE: 55 MMHG | SYSTOLIC BLOOD PRESSURE: 110 MMHG

## 2019-11-19 VITALS — DIASTOLIC BLOOD PRESSURE: 45 MMHG | SYSTOLIC BLOOD PRESSURE: 106 MMHG

## 2019-11-19 VITALS — SYSTOLIC BLOOD PRESSURE: 113 MMHG | DIASTOLIC BLOOD PRESSURE: 63 MMHG

## 2019-11-19 VITALS — DIASTOLIC BLOOD PRESSURE: 39 MMHG | SYSTOLIC BLOOD PRESSURE: 116 MMHG

## 2019-11-19 VITALS — DIASTOLIC BLOOD PRESSURE: 52 MMHG | SYSTOLIC BLOOD PRESSURE: 125 MMHG

## 2019-11-19 VITALS — DIASTOLIC BLOOD PRESSURE: 57 MMHG | SYSTOLIC BLOOD PRESSURE: 123 MMHG

## 2019-11-19 VITALS — SYSTOLIC BLOOD PRESSURE: 128 MMHG | DIASTOLIC BLOOD PRESSURE: 53 MMHG

## 2019-11-19 VITALS — SYSTOLIC BLOOD PRESSURE: 114 MMHG | DIASTOLIC BLOOD PRESSURE: 50 MMHG

## 2019-11-19 LAB
ANION GAP SERPL CALC-SCNC: 9.7 MMOL/L (ref 8–16)
BASOPHILS NFR BLD AUTO: 0.2 % (ref 0–2)
BUN SERPL-MCNC: 30 MG/DL (ref 7–18)
CALCIUM SERPL-MCNC: 8.2 MG/DL (ref 8.5–10.1)
CHLORIDE SERPL-SCNC: 107 MMOL/L (ref 98–107)
CO2 SERPL-SCNC: 28 MMOL/L (ref 21–32)
CREAT SERPL-MCNC: 3.3 MG/DL (ref 0.6–1.3)
EOSINOPHIL NFR BLD: 3.6 % (ref 0–7)
ERYTHROCYTE [DISTWIDTH] IN BLOOD BY AUTOMATED COUNT: 18.6 % (ref 11.5–14.5)
GLUCOSE SERPL-MCNC: 84 MG/DL (ref 74–106)
HCT VFR BLD CALC: 27.4 % (ref 42–54)
HGB BLD-MCNC: 8.4 G/DL (ref 13.5–17.5)
IMM GRANULOCYTES NFR BLD: 0.3 % (ref 0–5)
LYMPHOCYTES NFR BLD AUTO: 8.6 % (ref 15–50)
MCH RBC QN AUTO: 26.7 PG (ref 26–34)
MCHC RBC AUTO-ENTMCNC: 30.7 G/DL (ref 31–37)
MCV RBC: 87 FL (ref 80–100)
MONOCYTES NFR BLD: 8.6 % (ref 2–11)
NEUTROPHILS NFR BLD AUTO: 78.7 % (ref 40–80)
OSMOLALITY SERPL CALC.SUM OF ELEC: 283 MOSM/KG (ref 275–300)
PLATELET # BLD: 521 10X3/UL (ref 130–400)
PMV BLD AUTO: 9.2 FL (ref 7.4–10.4)
POTASSIUM SERPL-SCNC: 4.7 MMOL/L (ref 3.5–5.1)
RBC # BLD AUTO: 3.15 10X6/UL (ref 4.2–6.1)
SODIUM SERPL-SCNC: 140 MMOL/L (ref 136–145)
WBC # BLD AUTO: 9.1 10X3/UL (ref 4.8–10.8)

## 2019-11-19 NOTE — NUR
PT CONTINUALLY SLIDING SELF DOWN IN CHAIR, REPOSITIONED EVERY 20 MINUTES, 1130
ASSISTED BACK TO BED BY THERAPY.

## 2019-11-19 NOTE — NUR
OT NOTE: PT REQUIRED MIN A/CGA FOR ADL MOB WITH RW. PT COMPLETED UB HYGIENE
TASKS WITH MIN A.
THANK YOU, EPI KENNEDY

## 2019-11-19 NOTE — NUR
Stage 2 on sacrum remains unchanged. There is shearing noted on left buttock.
Pt squirms and slides down in chair and/or bed whenever he is assisted with
positioning/repositioning.  He has a very poor appetite and protein
supplements are encouraged.
He is on an air overlay mattress in bed and has an eggcrate in his chair.
Mepilex sacral dressings are being used for skin protection, but are
frequently having to be replaced due to rolling up/dislodging from the
constant scooting around in the bed and chair.
Wound care continues to monitor.

## 2019-11-19 NOTE — NUR
0700 PT RECIEVED ALERT AND ORIENTED ON BIPAP L PICC DRESSING CDI SEE IV
FLOWSHEET, SEE SHIFT ASSESSMENT FOR DETAILS
0800 PLACED ON NC, BREAKFAST SERVED, PT REFUSED
0845 PT AMBULATING WITH THERAPY

## 2019-11-19 NOTE — NUR
OT NOTE: ASSISTED PT WITH BED MOB INCLUDING SUPINE TO SIT WITH MIN ASSIST; AMB
WITH ASSIST OF PT WITH USE OF WALKER, 02, IV, AND SOMEONE TO FOLLOW WITH
CHAIR. PT ABLE TO AMB 80 FT, HOWEVER, SATS DROPPED TO 79% AND PT HAD TO BE
PLACED BACK ON BIPAP.  ATTTEMPTED REPOSITIONING PT NUMEROUS TIMES BUT CONT TO
WIGGLE FORWARD IN CHAIR. SIMPLE GROOMING TASKS WITH WASHCLOTH AND SET UP
NAE FOWLER OTR/L

## 2019-11-19 NOTE — NUR
PT RESTING IN BED. NO VISIUAL SIGNS OF DISTRESS NOTED. PT ON BIPAP. AM LABS
DRAWN. PT TOLERATED WELL. WILL CONTINUE TO MONITOR

## 2019-11-19 NOTE — NUR
1230 PT REFUSED LUNCH TRAY AND DRANK ENSURE FOR LUNCH
1500 BED BATH DONE
1700 DINNER TRAY BROUGHT TO ROOM BY ANOTHER NURSE
1715 CALLED TO ROOM BY TYSON, PT HAD DINNER TRAY ON BEDSIDE TABLE
OVER BED AND HAD FALLED OUT OF BED BETWEEN BED AND TABLE, ASSESSED AND
ASSISTED UP TO BED, DENIES PAIN AND ALL NEEDS, STATES HE WAS JUST MOVING. DR HANCOCK NOTIFIED, HOUSE SUPERVISOR NOTIFIED, CHARGE NURSE AND UNIT MANAGER
NOTIFIED. PTS BED ALARM WAS ON BUT NOT ALARMING DUE TO AIR OVERLAY, CALL LIGHT
WAS WITHIN REACH AND OFF.PT MOVED FROM CV1 TO CV8
1800 PT CONTINUES TO DENY PAIN

## 2019-11-20 VITALS — DIASTOLIC BLOOD PRESSURE: 55 MMHG | SYSTOLIC BLOOD PRESSURE: 115 MMHG

## 2019-11-20 VITALS — DIASTOLIC BLOOD PRESSURE: 60 MMHG | SYSTOLIC BLOOD PRESSURE: 135 MMHG

## 2019-11-20 VITALS — DIASTOLIC BLOOD PRESSURE: 58 MMHG | SYSTOLIC BLOOD PRESSURE: 132 MMHG

## 2019-11-20 VITALS — DIASTOLIC BLOOD PRESSURE: 67 MMHG | SYSTOLIC BLOOD PRESSURE: 147 MMHG

## 2019-11-20 VITALS — DIASTOLIC BLOOD PRESSURE: 64 MMHG | SYSTOLIC BLOOD PRESSURE: 124 MMHG

## 2019-11-20 VITALS — DIASTOLIC BLOOD PRESSURE: 61 MMHG | SYSTOLIC BLOOD PRESSURE: 108 MMHG

## 2019-11-20 VITALS — SYSTOLIC BLOOD PRESSURE: 139 MMHG | DIASTOLIC BLOOD PRESSURE: 73 MMHG

## 2019-11-20 LAB
ANION GAP SERPL CALC-SCNC: 7.7 MMOL/L (ref 8–16)
BASOPHILS NFR BLD AUTO: 0.2 % (ref 0–2)
BUN SERPL-MCNC: 33 MG/DL (ref 7–18)
CALCIUM SERPL-MCNC: 8.4 MG/DL (ref 8.5–10.1)
CHLORIDE SERPL-SCNC: 108 MMOL/L (ref 98–107)
CO2 SERPL-SCNC: 28.1 MMOL/L (ref 21–32)
CREAT SERPL-MCNC: 3 MG/DL (ref 0.6–1.3)
EOSINOPHIL NFR BLD: 2.2 % (ref 0–7)
ERYTHROCYTE [DISTWIDTH] IN BLOOD BY AUTOMATED COUNT: 18.8 % (ref 11.5–14.5)
GLUCOSE SERPL-MCNC: 90 MG/DL (ref 74–106)
HCT VFR BLD CALC: 30.3 % (ref 42–54)
HGB BLD-MCNC: 9.3 G/DL (ref 13.5–17.5)
IMM GRANULOCYTES NFR BLD: 0.3 % (ref 0–5)
LYMPHOCYTES NFR BLD AUTO: 7.3 % (ref 15–50)
MAGNESIUM SERPL-MCNC: 2.6 MG/DL (ref 1.8–2.4)
MCH RBC QN AUTO: 26.6 PG (ref 26–34)
MCHC RBC AUTO-ENTMCNC: 30.7 G/DL (ref 31–37)
MCV RBC: 86.8 FL (ref 80–100)
MONOCYTES NFR BLD: 9.3 % (ref 2–11)
NEUTROPHILS NFR BLD AUTO: 80.7 % (ref 40–80)
OSMOLALITY SERPL CALC.SUM OF ELEC: 284 MOSM/KG (ref 275–300)
PHOSPHATE SERPL-MCNC: 3.4 MG/DL (ref 2.5–4.9)
PLATELET # BLD: 536 10X3/UL (ref 130–400)
PMV BLD AUTO: 9.1 FL (ref 7.4–10.4)
POTASSIUM SERPL-SCNC: 4.8 MMOL/L (ref 3.5–5.1)
RBC # BLD AUTO: 3.49 10X6/UL (ref 4.2–6.1)
SODIUM SERPL-SCNC: 139 MMOL/L (ref 136–145)
WBC # BLD AUTO: 10.8 10X3/UL (ref 4.8–10.8)

## 2019-11-20 NOTE — NUR
INCONTINENT EPISODE OF URINE AT THIS TIME. PERICARE PROVIDED. COMPLETE LINEN
CHANGE PROVIDED. PULLED UP AND REPOSITIONED FOR COMFORT.

## 2019-11-20 NOTE — NUR
ASSESSMENT COMPLETE. AAOX4. PERRLA. VSS. CRACKLES AUSCULTATED BLL. BIPAP
PLACED AT THIS TIME. TOLERATING WELL. INCONTINENT TO URINE AND BOWEL. BED
ALARM ON AND CHECKED D/T PREVIOUS FALL. DENIES ANY NEEDS AT THIS TIME. VSS.
SAFETY MEASURES IN PLACE. CBIR.

## 2019-11-20 NOTE — NUR
Nutrition Follow-up:
Pt continues with poor PO intake. Reports drinking some Ensure this AM. Noted
pt refused breakfast and lunch yesterday. Per chart, stage 2 on sacrum is
unchanged. Discussed alternate nutrition with Dr. Turcios; Procalamine to be
started.
Diet: Regular, Mechanical Soft, Ensure TID
Wt: 137# (wt on 11/18 was 147.7#; 10.7# loss)
Last BM: 11/18 per chart
Labs reviewed
Meds noted: Bumex, Megace
-Continue current diet as tolerated.
-Encourage PO intake.
-Procalamine to be started per MD.
-Will monitor PO intake, skin integrity, and wt trends.
-RD following.

## 2019-11-20 NOTE — NUR
HS MEDS GIVEN. TOLERATED WELL. PT ANXIOUS AND RESTLESS. ORIENTED X4. DENIES
ANY NEEDS. BIPAP ON. REPLACED BANDAGE ON BRIDGE OF NOSE.

## 2019-11-20 NOTE — NUR
PT REPOSITIONED THROUGHOUT DAY, CONTINUALLY MOVES LEGS AND SLIDES IN
BED/CHAIR, DISCUSSED KEEPING LEGS STILL TO PREVENT SLIDING AND PT STATES
UNDERSTANDING BUT CONTINUES TO DO SO. REPOSITIONED WITH PILLOWS AND PT MOVES
PILLOWS AND REPOSITIONS HIMSELF OFF OF THEM. PT REQUESTING RADIO, HOSPITAL
DOES NOT HAVE ANY SO DAUGHTERS WILL BE BRINGING. PT SPENT MAJORITY OF DAY OFF
OF BIPAP AND ON 3L NC AND HAS TOLERATED WELL.

## 2019-11-20 NOTE — NUR
NURSE UNABLE TO LEAVE BEDSIDE. PT ATTEMPTING TO CLIMB OUT OF BED, PULL OF
BIPAP MASK. EASILY ORIENTED. PRN MED GIVEN. SEE MAR FOR DETAILS. NURSE REMAINS
AT BEDSIDE TO MAINTAIN PT SAFETY. BED ALARM ON, CHECKED.

## 2019-11-20 NOTE — NUR
OT NOTE: PT INITIALLY IN BED. ASSISTED PT TO EOB IWTH MIN ASSIST. STATIC
SITTING ON EOB X 8 MIN WITH FAIR+ BALANCE. PERFORMED LE EXS WHILE SITTING UP.
PROVIDED PT WITH WARM WASHCLOTH AND HE WAS ABLE TO WASH FACE, HANDS, NECK AND
CHEST. TRANSFER FROM BED TO CHAIR WITH MIN ASSIST. APPLIED DYCEM UNDER CUSHION
OF CHAIR TO PREVENT HIM FROM SLIDING OUT OF CHAIR, HOWEVER, AFTER ONLY ABOUT
AN HOUR, PT WAS PLACED BACK IN BED DUE TO DISCOMFORT IN CHAIR.
NAE FOWLER, OTR/L

## 2019-11-20 NOTE — NUR
CALL LIGHT ANSWERED. PT ASKED TO HAVE A BREAK FROM BIPAP. O2 % ON
BIPAP. PLACED ON 3L OF O2 VIA NC. WILL CONTINUE TO MONITOR.

## 2019-11-20 NOTE — NUR
0700 PT RECIEVED ALERT AND ORIENTED VSS ON BIPAP, DENIES PAIN, L PICC DRESSING
CDI, CALL LIGHT WITHIN REACH
0800 PLACED ON 3L NC, TOLERATING WELL, DRESSING APPLIED TO BRIDGE OF NOSE
WHERE REDNESS FROM BIPAP NOTED
0900 HAD ENSURE FOR BREAKFAST
1020 PLACED ON BIPAP FOR SOB AFTER COUGHING

## 2019-11-20 NOTE — NUR
DAUGHTERS CAME TO SEE PATIENT. BROUGHT HIM [place] FRIED CHICKEN. PT ATE A
FEW BITES. DESATED INTO MID 80S. PLACED ON BIPAP TO RECOVER. DAUGHTERS WOULD
LIKE TO HAVE A DR. CALL THEM TOMORROW WHEN THEY DO ROUNDS. KENZIE PICC LINE
DRESSING CHANGED PER PROTOCOL. STERILE TECHNIQUE USED. PULLED UP AND
REPOSITIONED FOR COMFORT. PARTIAL LINEN CHANGE PROVIDED. NO FURTHER NEEDS AT
THIS TIME. WILL CONTINUE TO MONITOR.

## 2019-11-20 NOTE — NUR
PT ANXIOUS AND RESTLESS. ATTEMPTED TO GIVE BREAK FROM BIPAP, PT DID NOT
TOLERATE WELL. RR INCREASED, O2 DROPPED. BIPAP REPLACED. PT BEGAN PULLING AT
BIPAP MASK FRANTICALLY. UNABLE TO CALM AND REORIENT AT THIS TIME. DR. ALEXANDRA LOCKETT. NURSE REMAINS AT BEDSIDE.

## 2019-11-21 VITALS — DIASTOLIC BLOOD PRESSURE: 56 MMHG | SYSTOLIC BLOOD PRESSURE: 138 MMHG

## 2019-11-21 VITALS — SYSTOLIC BLOOD PRESSURE: 138 MMHG | DIASTOLIC BLOOD PRESSURE: 92 MMHG

## 2019-11-21 VITALS — DIASTOLIC BLOOD PRESSURE: 66 MMHG | SYSTOLIC BLOOD PRESSURE: 97 MMHG

## 2019-11-21 VITALS — DIASTOLIC BLOOD PRESSURE: 59 MMHG | SYSTOLIC BLOOD PRESSURE: 124 MMHG

## 2019-11-21 VITALS — SYSTOLIC BLOOD PRESSURE: 151 MMHG | DIASTOLIC BLOOD PRESSURE: 89 MMHG

## 2019-11-21 VITALS — DIASTOLIC BLOOD PRESSURE: 58 MMHG | SYSTOLIC BLOOD PRESSURE: 95 MMHG

## 2019-11-21 VITALS — SYSTOLIC BLOOD PRESSURE: 120 MMHG | DIASTOLIC BLOOD PRESSURE: 67 MMHG

## 2019-11-21 VITALS — SYSTOLIC BLOOD PRESSURE: 109 MMHG | DIASTOLIC BLOOD PRESSURE: 84 MMHG

## 2019-11-21 VITALS — DIASTOLIC BLOOD PRESSURE: 53 MMHG | SYSTOLIC BLOOD PRESSURE: 132 MMHG

## 2019-11-21 VITALS — SYSTOLIC BLOOD PRESSURE: 159 MMHG | DIASTOLIC BLOOD PRESSURE: 133 MMHG

## 2019-11-21 VITALS — DIASTOLIC BLOOD PRESSURE: 61 MMHG | SYSTOLIC BLOOD PRESSURE: 126 MMHG

## 2019-11-21 VITALS — DIASTOLIC BLOOD PRESSURE: 59 MMHG | SYSTOLIC BLOOD PRESSURE: 131 MMHG

## 2019-11-21 VITALS — SYSTOLIC BLOOD PRESSURE: 122 MMHG | DIASTOLIC BLOOD PRESSURE: 68 MMHG

## 2019-11-21 VITALS — SYSTOLIC BLOOD PRESSURE: 131 MMHG | DIASTOLIC BLOOD PRESSURE: 58 MMHG

## 2019-11-21 VITALS — DIASTOLIC BLOOD PRESSURE: 54 MMHG | SYSTOLIC BLOOD PRESSURE: 90 MMHG

## 2019-11-21 VITALS — SYSTOLIC BLOOD PRESSURE: 99 MMHG | DIASTOLIC BLOOD PRESSURE: 81 MMHG

## 2019-11-21 VITALS — DIASTOLIC BLOOD PRESSURE: 52 MMHG | SYSTOLIC BLOOD PRESSURE: 126 MMHG

## 2019-11-21 VITALS — SYSTOLIC BLOOD PRESSURE: 96 MMHG | DIASTOLIC BLOOD PRESSURE: 48 MMHG

## 2019-11-21 VITALS — SYSTOLIC BLOOD PRESSURE: 132 MMHG | DIASTOLIC BLOOD PRESSURE: 66 MMHG

## 2019-11-21 VITALS — SYSTOLIC BLOOD PRESSURE: 113 MMHG | DIASTOLIC BLOOD PRESSURE: 65 MMHG

## 2019-11-21 VITALS — DIASTOLIC BLOOD PRESSURE: 77 MMHG | SYSTOLIC BLOOD PRESSURE: 104 MMHG

## 2019-11-21 VITALS — DIASTOLIC BLOOD PRESSURE: 62 MMHG | SYSTOLIC BLOOD PRESSURE: 117 MMHG

## 2019-11-21 VITALS — DIASTOLIC BLOOD PRESSURE: 58 MMHG | SYSTOLIC BLOOD PRESSURE: 112 MMHG

## 2019-11-21 LAB
ANION GAP SERPL CALC-SCNC: 7.2 MMOL/L (ref 8–16)
BASOPHILS NFR BLD AUTO: 0.2 % (ref 0–2)
BUN SERPL-MCNC: 34 MG/DL (ref 7–18)
CALCIUM SERPL-MCNC: 8.2 MG/DL (ref 8.5–10.1)
CHLORIDE SERPL-SCNC: 107 MMOL/L (ref 98–107)
CO2 SERPL-SCNC: 31.3 MMOL/L (ref 21–32)
CREAT SERPL-MCNC: 3.1 MG/DL (ref 0.6–1.3)
EOSINOPHIL NFR BLD: 1.3 % (ref 0–7)
ERYTHROCYTE [DISTWIDTH] IN BLOOD BY AUTOMATED COUNT: 18.6 % (ref 11.5–14.5)
GLUCOSE SERPL-MCNC: 93 MG/DL (ref 74–106)
HCT VFR BLD CALC: 28.9 % (ref 42–54)
HGB BLD-MCNC: 9 G/DL (ref 13.5–17.5)
IMM GRANULOCYTES NFR BLD: 0.2 % (ref 0–5)
LYMPHOCYTES NFR BLD AUTO: 6.1 % (ref 15–50)
MCH RBC QN AUTO: 26.7 PG (ref 26–34)
MCHC RBC AUTO-ENTMCNC: 31.1 G/DL (ref 31–37)
MCV RBC: 85.8 FL (ref 80–100)
MONOCYTES NFR BLD: 7 % (ref 2–11)
NEUTROPHILS NFR BLD AUTO: 85.2 % (ref 40–80)
OSMOLALITY SERPL CALC.SUM OF ELEC: 288 MOSM/KG (ref 275–300)
PLATELET # BLD: 539 10X3/UL (ref 130–400)
PMV BLD AUTO: 8.9 FL (ref 7.4–10.4)
POTASSIUM SERPL-SCNC: 4.5 MMOL/L (ref 3.5–5.1)
RBC # BLD AUTO: 3.37 10X6/UL (ref 4.2–6.1)
SODIUM SERPL-SCNC: 141 MMOL/L (ref 136–145)
WBC # BLD AUTO: 12 10X3/UL (ref 4.8–10.8)

## 2019-11-21 NOTE — NUR
0700 PT RECIEVED ALERT, CONFUSED TO TIME AND SITUATION, BELIEVES IT IS JANUARY
31, DENIES PAIN, VSS, ON BIPAP, PICC DRESSING CDI, SEE SHIFT ASSESSMENT FOR
DETAILS
0900 TOOK BIPAP OFF FOR MEDS, TOLERATED WELL AND DRANK ENSURE, WORK OF
BREATHING SLOWLY INCREASED AND PLACED BACK ON BIPAP THOUGH SPO2 REMAINED OVER
93%

## 2019-11-21 NOTE — NUR
PT PULLED UP IN BED. BIPAP MASK DOWN ON FACE AND PLACED IN CORRECT POSITION.
RECEIVED MEDICATIONS PER MAR, TOLERATED WELL. WILL CONTINUE TO OBSERVE

## 2019-11-21 NOTE — NUR
PT GRABBING LINES AND BIPAP, ATTEMPTING TO PULL EVERYTHING, DR HANCOCK IN UNIT
ORDERS FOR HALDOL RECIEVED, AWAITING FROM PHARMACY, SPOKE WITH TAINA

## 2019-11-21 NOTE — NUR
PT LAYING IN BED RESTING EASY WITH EYES CLOSED. CURRENTLY NOT
ATTEMPTING TO CLIMB OUT OF BED AND PULL AT MEDICAL EQUIPMENT. POSTPONED
CXR UNTIL PT WAKES.

## 2019-11-21 NOTE — NUR
PT DAUGHTERS WANT TO CONTINUE CURRENT POC AND FULL CODE STATUS, STATE THEY ARE
AWARE PT IS DYING BUT THEY ARE NOT READY TO PLAN FOR THAT. DR HANCOCK SPOKE
WITH FAMILY IN GREAT LENGTH AND IS AWARE OF FAMILYS DECISION

## 2019-11-21 NOTE — NUR
DR. MORRIS RETURNED PHONE CALL. PRN MEDICATION ORDERED, RESTRAINTS ORDERED. SEE
MAR FOR DETAILS. HOUSE SUPERVISOR NOTIFIED.

## 2019-11-21 NOTE — NUR
REASSESSMENT COMPLETE. PT CONTINUES ATTEMPTS TO CLIMB OUT OF BED. RE-DIRECTING
PT WITHOUT POSITIVE RESULTS. CONFUSED AND ANXIOUS. COMPLETE BED BATH GIVEN.
LINENS CHANGED. TOLERATED WELL. BED ALARM ON. REPOSITIONED FOR COMFORT. SIP OF
WATER OFFERED. SAFETY MEASURES IN PLACE. CBIR.

## 2019-11-21 NOTE — NUR
PT BROKE BIPAP MASK OFF OF FACE, CONTINUES TO CLIMB OUT OF BED. ANXIOUS AND
PULLING AT EKG LEADS. BIPAP MASK REPAIRED BY RT. DR. ALEXANDRA LOCKETT.

## 2019-11-21 NOTE — NUR
PT RECEIVED WITH EYES OPEN. ON BIPAP. PT SLIDE DOWN IN BED AND PULLED UP WITH
ASSIST OF OFF GOING NURSE. WILL CONTINUE TO OBSERVE. CALL LIGHT IN REACH.

## 2019-11-21 NOTE — NUR
PT CONSTANTLY SLIDING DOWN IN BED AND ATTEMPTING TO GET LEGS OUT OF BED. PT
INCONTINENT OF BLADDER WITH LINENS CHANGED AND PERICARE PROVIDED. PT WITH
CONDOM CATHETER BUT WITH PT CONSTANT MOVING CATHETER COMES OFF. CATHETER OFF
AT THIS TIME. PT PULLED UP IN BED. PT ON BIPAP. WILL CONTINUE TO OBSERVE.

## 2019-11-21 NOTE — NUR
PTS DAUGHTERS SPOKE WITH DR ANDERSON AND DR MORRIS, WHEN GETTING READY TO LEAVE
THEY MENTIONED THAT THEY WERE OK WITH NO HEROIC MEASURES, ASKED 
RN ZAC THOMPSON TO COME OVER AND WITNESS CONVERSATION, BOTH PTS DAUGHTERS
STATED THEY DID NOT WANT MEDS, INTUBATION/VENTILATOR OR COMPRESSIONS IF PT
WERE TO REQUIRE THEM, CALLED DR HANCOCK AND RECIEVED DNR ORDER

## 2019-11-22 VITALS — SYSTOLIC BLOOD PRESSURE: 134 MMHG | DIASTOLIC BLOOD PRESSURE: 79 MMHG

## 2019-11-22 VITALS — DIASTOLIC BLOOD PRESSURE: 53 MMHG | SYSTOLIC BLOOD PRESSURE: 101 MMHG

## 2019-11-22 VITALS — DIASTOLIC BLOOD PRESSURE: 97 MMHG | SYSTOLIC BLOOD PRESSURE: 118 MMHG

## 2019-11-22 VITALS — SYSTOLIC BLOOD PRESSURE: 126 MMHG | DIASTOLIC BLOOD PRESSURE: 64 MMHG

## 2019-11-22 VITALS — SYSTOLIC BLOOD PRESSURE: 112 MMHG | DIASTOLIC BLOOD PRESSURE: 52 MMHG

## 2019-11-22 VITALS — SYSTOLIC BLOOD PRESSURE: 99 MMHG | DIASTOLIC BLOOD PRESSURE: 52 MMHG

## 2019-11-22 VITALS — DIASTOLIC BLOOD PRESSURE: 50 MMHG | SYSTOLIC BLOOD PRESSURE: 134 MMHG

## 2019-11-22 VITALS — DIASTOLIC BLOOD PRESSURE: 60 MMHG | SYSTOLIC BLOOD PRESSURE: 103 MMHG

## 2019-11-22 VITALS — SYSTOLIC BLOOD PRESSURE: 112 MMHG | DIASTOLIC BLOOD PRESSURE: 57 MMHG

## 2019-11-22 VITALS — SYSTOLIC BLOOD PRESSURE: 107 MMHG | DIASTOLIC BLOOD PRESSURE: 61 MMHG

## 2019-11-22 VITALS — DIASTOLIC BLOOD PRESSURE: 40 MMHG | SYSTOLIC BLOOD PRESSURE: 97 MMHG

## 2019-11-22 VITALS — SYSTOLIC BLOOD PRESSURE: 125 MMHG | DIASTOLIC BLOOD PRESSURE: 72 MMHG

## 2019-11-22 VITALS — DIASTOLIC BLOOD PRESSURE: 57 MMHG | SYSTOLIC BLOOD PRESSURE: 114 MMHG

## 2019-11-22 VITALS — DIASTOLIC BLOOD PRESSURE: 56 MMHG | SYSTOLIC BLOOD PRESSURE: 120 MMHG

## 2019-11-22 VITALS — DIASTOLIC BLOOD PRESSURE: 44 MMHG | SYSTOLIC BLOOD PRESSURE: 124 MMHG

## 2019-11-22 VITALS — SYSTOLIC BLOOD PRESSURE: 119 MMHG | DIASTOLIC BLOOD PRESSURE: 50 MMHG

## 2019-11-22 VITALS — DIASTOLIC BLOOD PRESSURE: 55 MMHG | SYSTOLIC BLOOD PRESSURE: 103 MMHG

## 2019-11-22 VITALS — DIASTOLIC BLOOD PRESSURE: 56 MMHG | SYSTOLIC BLOOD PRESSURE: 116 MMHG

## 2019-11-22 VITALS — DIASTOLIC BLOOD PRESSURE: 53 MMHG | SYSTOLIC BLOOD PRESSURE: 108 MMHG

## 2019-11-22 VITALS — SYSTOLIC BLOOD PRESSURE: 127 MMHG | DIASTOLIC BLOOD PRESSURE: 60 MMHG

## 2019-11-22 VITALS — DIASTOLIC BLOOD PRESSURE: 58 MMHG | SYSTOLIC BLOOD PRESSURE: 119 MMHG

## 2019-11-22 VITALS — DIASTOLIC BLOOD PRESSURE: 57 MMHG | SYSTOLIC BLOOD PRESSURE: 105 MMHG

## 2019-11-22 VITALS — SYSTOLIC BLOOD PRESSURE: 108 MMHG | DIASTOLIC BLOOD PRESSURE: 54 MMHG

## 2019-11-22 VITALS — DIASTOLIC BLOOD PRESSURE: 73 MMHG | SYSTOLIC BLOOD PRESSURE: 140 MMHG

## 2019-11-22 LAB
ALBUMIN SERPL-MCNC: 2.7 G/DL (ref 3.4–5)
ALP SERPL-CCNC: 85 U/L (ref 46–116)
ALT SERPL-CCNC: 26 U/L (ref 10–68)
ANION GAP SERPL CALC-SCNC: 9.1 MMOL/L (ref 8–16)
BASOPHILS NFR BLD AUTO: 0.2 % (ref 0–2)
BILIRUB SERPL-MCNC: 0.74 MG/DL (ref 0.2–1.3)
BUN SERPL-MCNC: 37 MG/DL (ref 7–18)
CALCIUM SERPL-MCNC: 8.5 MG/DL (ref 8.5–10.1)
CHLORIDE SERPL-SCNC: 106 MMOL/L (ref 98–107)
CO2 SERPL-SCNC: 29.4 MMOL/L (ref 21–32)
CREAT SERPL-MCNC: 2.9 MG/DL (ref 0.6–1.3)
EOSINOPHIL NFR BLD: 0.6 % (ref 0–7)
ERYTHROCYTE [DISTWIDTH] IN BLOOD BY AUTOMATED COUNT: 18.4 % (ref 11.5–14.5)
GLOBULIN SER-MCNC: 3.9 G/L
GLUCOSE SERPL-MCNC: 102 MG/DL (ref 74–106)
HCT VFR BLD CALC: 30.5 % (ref 42–54)
HGB BLD-MCNC: 9.7 G/DL (ref 13.5–17.5)
IMM GRANULOCYTES NFR BLD: 0.2 % (ref 0–5)
LYMPHOCYTES NFR BLD AUTO: 4.8 % (ref 15–50)
MAGNESIUM SERPL-MCNC: 2.6 MG/DL (ref 1.8–2.4)
MCH RBC QN AUTO: 26.9 PG (ref 26–34)
MCHC RBC AUTO-ENTMCNC: 31.8 G/DL (ref 31–37)
MCV RBC: 84.7 FL (ref 80–100)
MONOCYTES NFR BLD: 6.5 % (ref 2–11)
NEUTROPHILS NFR BLD AUTO: 87.7 % (ref 40–80)
OSMOLALITY SERPL CALC.SUM OF ELEC: 287 MOSM/KG (ref 275–300)
PHOSPHATE SERPL-MCNC: 2.9 MG/DL (ref 2.5–4.9)
PLATELET # BLD: 616 10X3/UL (ref 130–400)
PMV BLD AUTO: 9.2 FL (ref 7.4–10.4)
POTASSIUM SERPL-SCNC: 4.5 MMOL/L (ref 3.5–5.1)
PROT SERPL-MCNC: 6.6 G/DL (ref 6.4–8.2)
RBC # BLD AUTO: 3.6 10X6/UL (ref 4.2–6.1)
SODIUM SERPL-SCNC: 140 MMOL/L (ref 136–145)
WBC # BLD AUTO: 12.4 10X3/UL (ref 4.8–10.8)

## 2019-11-22 NOTE — NUR
PT REASSESSMENT COMPLETED AT THIS TIME, PT STILL VERY RESTLESS AND AGITATED,
VSS, PT REORIENTED, PT STILL VERY CONFUSED AT THIS TIME

## 2019-11-22 NOTE — NUR
PT WITH EYES CLOSED AND CHEST RISING. EASILY AWOKEN TO VERBAL STIMULI.
INCONTINENT OF BLADDER. PADS CHANGED AND PERICARE PROVIDED. CONTINUES BIPAP.
WILL CONTINUE TO OBSERVE

## 2019-11-22 NOTE — NUR
PT WITH EYES CLOSED AND CHEST RISING. NO S/S OF DISTRESS. CONTINUES BIPAP.
WILL CONTINUE TO OBSERVE.

## 2019-11-22 NOTE — NUR
PT ASSESSMENT COMPLETED AT THIS TIME, NO CHANGES FROM NURSE REPORT, PT VERY
ANIXIOUS AND RESTLESS, VSS, PT REORIENTED BUT PT IS VERY CONFUSED, WILL
MONITOR FOR CHANGES

## 2019-11-22 NOTE — NUR
CHG BATH GIVEN, AIR MATRESS CHANGED WITH LINENS. PT TOLERATED WELL. CONDOM
CATHETER PLACED WITHOUT ADHESIVE. YELLOW URINE NOTED. VSS. CONTINUES BIPAP.
WILL CONTINUE TO OBSERVE.

## 2019-11-22 NOTE — MORECARE
CASE MANAGEMENT DISCHARGE SUMMARY
 
 
PATIENT: BARRY CARR               UNIT: [id number]
ACCOUNT#: [id number]                       ADM DATE: 10/28/19
AGE: 74     : 41  SEX: M            ROOM/BED: D[facility]    
AUTHOR: FÁTIMA,TANK                             PHYSICIAN:                               
 
REFERRING PHYSICIAN: AMARI DUARTE DO            
DATE OF SERVICE: 19
Discharge Plan
 
 
Patient Name: BARRY CARR
Facility: [facility]:[place]
Encounter #: [id number]
Medical Record #: [id number]
: 1945
Planned Disposition: Home with Home Health
Anticipated Discharge Date: 
 
Discharge Date: 
Expected LOS: 
Initial Reviewer: [email or web address]
Initial Review Date: 10/30/2019
Generated: 19   7:08 pm 
Comments
 
DCP- Discharge Planning
 
Updated by [id number]: Rozina Rivers on 19   5:08 pm CT
Late Entry  19   
  
CM notified that family has decided to make patient a DNR. CM witness with 
nurse Mary Ann both daughters (Luna & Luna)  agreeing to make patient 
DNR. 
 Daughters have spoken to both Dr. Turcios and Dr. Dey regarding patient 
status.  Family has been updated on patients condition and they are suppose 
to make a decision of LTACH v/s Hospice within the next day or two. CM will 
continue to follow and assist as needed with discharge planning / needs.
DCP- Discharge Planning
 
Updated by [id number]: Rozina Rivers on 19   3:31 pm CT
CM spoke with daughter Luna and daughter Luna regarding patient and 
discharge planning.  CM explained that patient at this point will not be able 
to discharge home alone even with Home Health d/t it not being a safe 
discharge.  CM explained that the MD had placed an order for rehab prescreen 
and once patient is stable and if insurance approves inpatient rehab then he 
could possibly go there. Both daughters agree to this plan. CM also explained 
that if insurance doesn't authorize inpatient rehab then then they will most 
 
likely suggest Skilled Nursing Facility for rehab.  Both daughters state that 
he will never agree to that.  CM received an updated number Luna 
[phone] until Wednesday when she can get her phone reactivated. Luna  
[phone].  CM updated daughter' s that patient will most likely transfer 
out to floor once a bed is available. CM will continue to follow and assist 
as needed with discharge planning needs.
DCP- Discharge Planning
 
Updated by [id number]: Rozina Rivers on 19  10:37 am CT
CM attempted to contact daughter Luna Carr [phone]. CM received a 
message number is not reachable. CM relayed message to nursing if daughter 
calls ask her to call CM.  CM will continue to follow and assist as needed 
with discharge planning / needs.
DCP- Discharge Planning
 
Updated by [id number]: Scott Sun on 10/30/19   4:56 pm CT
Patient Name: BARRY CARR                                     
Admission Status: ER   
Accout number: [id number]                              
Admission Date: 10-   
: 1945                                                        
Admission Diagnosis:   
Attending: AMARI DUARTE                                                
Current LOS:  2   
  
Anticipated DC Date:    
Planned Disposition: Home with Home Health   
Primary Insurance: HUMANA CHOICE O [facility]   
PLANNED EXTERNAL PROVIDER:  [facility]  
  
Discharge Planning Comments:   
CM RECEIVED ORDER TO CONSULT WITH PT REGARDING DISCHARGE NEEDS.  CM MET WITH 
PT IN ROOM TO DISCUSS DISCHARGE PLANNING AND NEEDS. PT REPORTS LIVING AT HOME 
INDEPENDENTLY AND ALONE. PT HAS CANE AND WALKER WITH NO MEDICAL EQUIPMENT  
PROVIDER PREFERENCE.  PT HAD [facility] BUT IT HAD ALREADY STOPPED; 
PT HAS NO OUTSIDE SERVICES ASSISTING IN THE HOME. CM DISCUSSED AVAILABILITY 
OF HOME HEALTH, REHAB SERVICES AND MEDICAL EQUIPMENT. PT DENIES NEED OF 
INPATIENT OR SKILLED REHAB.  PT WANTS HOME HEALTH WITH [facility]; PT STATES HIS 
DAUGHTER WILL BE COOKING AND CLEANING FOR HIM AFTER DISCHARGE, BUT HE WILL 
STILL BE LIVING ALONE.  PT REPORTS HIS DAUGHTER WILL PICK HIM UP FOR 
DISCHARGE HOME. IMPORTANT MESSAGE FROM MEDICARE PROVIDED AND EXPLAINED.  
CHOICE FOR [facility] SIGNED.    
  
CM TO ARRANGE [facility] FOR DISCHARGE HOME WITH PHYSICIAN AGREEMENT 
AND ORDERS.  PCP IS DR. NEGRETE.  CM TO CONTINUE TO FOLLOW AND ASSIST AS NEEDED. 
  
: Scott Sun
 DCPIA - Discharge Planning Initial Assessment
 
Updated by [id number]: Scott Sun on 10/30/19   4:52 pm
*  Is the patient Alert and Oriented?
Yes
 
*  How many steps to enter\exit or inside your home? NONE *  PCP DR. NEGRETE *  Pharmacy
[facility]  SHORT TERM - [facility]
 
*  Preadmission Environment
Home Alone
*  ADLs
Independent
*  Equipment
Cane
Rolling Walker
*  Other Equipment
NO MEDICAL EQUIPMENT PROVIDER PREFERENCE
*  List name and contact numbers for known caregivers / representatives who 
currently or will assist patient after discharge:
LUNA ALEXI, DTR, [phone]
*  Verbal permission to speak to the caregivers and representatives has been 
obtained from the patient.
N/A
*  Community resources currently utilized
None
*  Please name any agencies selected above.
NONE
*  Additional services required to return to the preadmission environment?
No
*  Can the patient safely return to the preadmission environment?
Yes
*  Has this patient been hospitalized within the prior 30 days at any 
hospital?
No
 
 
 
 
 
Coverage Notice
 
Reviewer: [email or web address] Sun
 
Notice Issued Date-Time: 10/30/2019  16:40
Notice Type: IM Discharge Notice
 
Notice Delivered To: Patient
Relationship to Patient: 
Representative Name: 
 
Delivery Method: HAND - Hand Delivered
Bell Days:
Prior Verbal Notification: 
 
Recipient Understood Notice: Yes
Recipient Signature: Yes
Med Rec Note Co-signed by Attending:
 
 
Coverage Notice Comment:  
Reviewer: KAY Sun
 
Notice Issued Date-Time: 10/30/2019  16:40
Notice Type: Patient Choice Letter
 
Notice Delivered To: Patient
Relationship to Patient: 
Representative Name: 
 
Delivery Method: HAND - Hand Delivered
Bell Days:
Prior Verbal Notification: 
 
Recipient Understood Notice: Yes
Recipient Signature: Yes
Med Rec Note Co-signed by Attending:
 
Coverage Notice Comment:  [facility]
 
Last DP export: 19   3:32 
Patient Name: BARRY CARR
Encounter #: [id number]
Page [id number]
 
 
 
 
 
Electronically Signed by TANK SHINE on 19 at 1808
 
 
 
 
 
 
**All edits/amendments must be made on the electronic document**
 
DICTATION DATE: 19     : DILMA  19     
RPT#: [id number]                                DC DATE:        
                                               STATUS: ADM IN  
[facility]
[address] [place] [address]
***END OF REPORT***

## 2019-11-22 NOTE — NUR
PT GIVEN PM MEDS WITHOUT DIFFCULTY, PT REMAINS VERY ANIXIOUS AND AGITATED AT
THIS TIME, VSS WILL MONITOR FOR CHANGES

## 2019-11-22 NOTE — NUR
NUTRITION F/U
PT CONTINUES WITH POOR PO INTAKE, DID NOT EAT BREAKFAST THIS AM. PROCALAMINE
HAS BEEN STARTED AT 50 CC/HR. PROVIDING ~300 KCAL, 36 GM PROTEIN PER DAY.
RD FOLLOWING

## 2019-11-22 NOTE — MORECARE
CASE MANAGEMENT DISCHARGE SUMMARY
 
 
PATIENT: BARRY CARR               UNIT: [id number]
ACCOUNT#: [id number]                       ADM DATE: 10/28/19
AGE: 74     : 41  SEX: M            ROOM/BED: D.[facility]    
AUTHOR: FÁTIMA,TANK                             PHYSICIAN:                               
 
REFERRING PHYSICIAN: AMARI DUARTE DO            
DATE OF SERVICE: 19
Discharge Plan
 
 
Patient Name: BARRY CARR
Facility: [facility]:[place]
Encounter #: [id number]
Medical Record #: [id number]
: 1945
Planned Disposition: Home with Home Health
Anticipated Discharge Date: 
 
Discharge Date: 
Expected LOS: 
Initial Reviewer: [email or web address]
Initial Review Date: 10/30/2019
Generated: 19   7:16 pm 
Comments
 
DCP- Discharge Planning
 
Updated by [email or web address]: Rozina Rivers on 19   5:08 pm CT
Late Entry  19   
  
CM notified that family has decided to make patient a DNR. CM witness with 
nurse Mary Ann both daughters (Luna & Luna)  agreeing to make patient 
DNR. 
 Daughters have spoken to both Dr. Turcios and Dr. Dey regarding patient 
status.  Family has been updated on patients condition and they are suppose 
to make a decision of LTACH v/s Hospice within the next day or two. CM will 
continue to follow and assist as needed with discharge planning / needs.
DCP- Discharge Planning
 
Updated by [id number]: Rozina Rivers on 19   3:31 pm CT
CM spoke with daughter Luna and daughter Luna regarding patient and 
discharge planning.  CM explained that patient at this point will not be able 
to discharge home alone even with Home Health d/t it not being a safe 
discharge.  CM explained that the MD had placed an order for rehab prescreen 
and once patient is stable and if insurance approves inpatient rehab then he 
could possibly go there. Both daughters agree to this plan. CM also explained 
that if insurance doesn't authorize inpatient rehab then then they will most 
 
likely suggest Skilled Nursing Facility for rehab.  Both daughters state that 
he will never agree to that.  CM received an updated number Luna 
[phone] until Wednesday when she can get her phone reactivated. Luna  
[phone].  CM updated daughter' s that patient will most likely transfer 
out to floor once a bed is available. CM will continue to follow and assist 
as needed with discharge planning needs.
DCP- Discharge Planning
 
Updated by [id number]: Rozina Rivers on 19  10:37 am CT
CM attempted to contact daughter Luna Carr [phone]. CM received a 
message number is not reachable. CM relayed message to nursing if daughter 
calls ask her to call CM.  CM will continue to follow and assist as needed 
with discharge planning / needs.
DCP- Discharge Planning
 
Updated by [email or web address]: Scott Sun on 10/30/19   4:56 pm CT
Patient Name: BARRY CARR                                     
Admission Status: ER   
Accout number: [id number]                              
Admission Date: 10-   
: 1945                                                        
Admission Diagnosis:   
Attending: AMARI DUARTE                                                
Current LOS:  2   
  
Anticipated DC Date:    
Planned Disposition: Home with Home Health   
Primary Insurance: [employer]NA CHOICE O [facility]   
PLANNED EXTERNAL PROVIDER:  [facility]  
  
Discharge Planning Comments:   
CM RECEIVED ORDER TO CONSULT WITH PT REGARDING DISCHARGE NEEDS.  CM MET WITH 
PT IN ROOM TO DISCUSS DISCHARGE PLANNING AND NEEDS. PT REPORTS LIVING AT HOME 
INDEPENDENTLY AND ALONE. PT HAS CANE AND WALKER WITH NO MEDICAL EQUIPMENT  
PROVIDER PREFERENCE.  PT HAD [facility] BUT IT HAD ALREADY STOPPED; 
PT HAS NO OUTSIDE SERVICES ASSISTING IN THE HOME. CM DISCUSSED AVAILABILITY 
OF HOME HEALTH, REHAB SERVICES AND MEDICAL EQUIPMENT. PT DENIES NEED OF 
INPATIENT OR SKILLED REHAB.  PT WANTS HOME HEALTH WITH [facility]; PT STATES HIS 
DAUGHTER WILL BE COOKING AND CLEANING FOR HIM AFTER DISCHARGE, BUT HE WILL 
STILL BE LIVING ALONE.  PT REPORTS HIS DAUGHTER WILL PICK HIM UP FOR 
DISCHARGE HOME. IMPORTANT MESSAGE FROM MEDICARE PROVIDED AND EXPLAINED.  
CHOICE FOR [facility] SIGNED.    
  
CM TO ARRANGE [facility] FOR DISCHARGE HOME WITH PHYSICIAN AGREEMENT 
AND ORDERS.  PCP IS DR. NEGRETE.  CM TO CONTINUE TO FOLLOW AND ASSIST AS NEEDED. 
  
: Scott Sun
 DCPIA - Discharge Planning Initial Assessment
 
Updated by [email or web address]: Scott Sun on 10/30/19   4:52 pm
*  Is the patient Alert and Oriented?
Yes
 
*  How many steps to enter\exit or inside your home? NONE *  PCP DR. NEGRETE *  Pharmacy
[facility]  SHORT TERM - [facility]
 
*  Preadmission Environment
Home Alone
*  ADLs
Independent
*  Equipment
Cane
Rolling Walker
*  Other Equipment
NO MEDICAL EQUIPMENT PROVIDER PREFERENCE
*  List name and contact numbers for known caregivers / representatives who 
currently or will assist patient after discharge:
LUNA ALEXI, DTR, [phone]
*  Verbal permission to speak to the caregivers and representatives has been 
obtained from the patient.
N/A
*  Community resources currently utilized
None
*  Please name any agencies selected above.
NONE
*  Additional services required to return to the preadmission environment?
No
*  Can the patient safely return to the preadmission environment?
Yes
*  Has this patient been hospitalized within the prior 30 days at any 
hospital?
No
 
 
 
 
 
Coverage Notice
 
Reviewer: [email or web address] Sun
 
Notice Issued Date-Time: 10/30/2019  16:40
Notice Type: IM Discharge Notice
 
Notice Delivered To: Patient
Relationship to Patient: 
Representative Name: 
 
Delivery Method: HAND - Hand Delivered
Bell Days:
Prior Verbal Notification: 
 
Recipient Understood Notice: Yes
Recipient Signature: Yes
Med Rec Note Co-signed by Attending:
 
 
Coverage Notice Comment:  
Reviewer: KAY Sun
 
Notice Issued Date-Time: 10/30/2019  16:40
Notice Type: Patient Choice Letter
 
Notice Delivered To: Patient
Relationship to Patient: 
Representative Name: 
 
Delivery Method: HAND - Hand Delivered
Bell Days:
Prior Verbal Notification: 
 
Recipient Understood Notice: Yes
Recipient Signature: Yes
Med Rec Note Co-signed by Attending:
 
Coverage Notice Comment:  [facility]
 
Last DP export: 19   5:08 
Patient Name: BARRY CARR
Encounter #: [id number]
Page [id number]
 
 
 
 
 
Electronically Signed by TANK SHINE on 19 at 1816
 
 
 
 
 
 
**All edits/amendments must be made on the electronic document**
 
DICTATION DATE: 19     : DILMA  19     
RPT#: [id number]                                DC DATE:        
                                               STATUS: ADM IN  
[facility]
[address] [place] [address]
***END OF REPORT***

## 2019-11-23 VITALS — SYSTOLIC BLOOD PRESSURE: 127 MMHG | DIASTOLIC BLOOD PRESSURE: 57 MMHG

## 2019-11-23 VITALS — SYSTOLIC BLOOD PRESSURE: 116 MMHG | DIASTOLIC BLOOD PRESSURE: 55 MMHG

## 2019-11-23 VITALS — SYSTOLIC BLOOD PRESSURE: 109 MMHG | DIASTOLIC BLOOD PRESSURE: 79 MMHG

## 2019-11-23 VITALS — SYSTOLIC BLOOD PRESSURE: 113 MMHG | DIASTOLIC BLOOD PRESSURE: 55 MMHG

## 2019-11-23 VITALS — DIASTOLIC BLOOD PRESSURE: 63 MMHG | SYSTOLIC BLOOD PRESSURE: 134 MMHG

## 2019-11-23 VITALS — SYSTOLIC BLOOD PRESSURE: 119 MMHG | DIASTOLIC BLOOD PRESSURE: 65 MMHG

## 2019-11-23 VITALS — SYSTOLIC BLOOD PRESSURE: 132 MMHG | DIASTOLIC BLOOD PRESSURE: 87 MMHG

## 2019-11-23 VITALS — SYSTOLIC BLOOD PRESSURE: 142 MMHG | DIASTOLIC BLOOD PRESSURE: 78 MMHG

## 2019-11-23 VITALS — DIASTOLIC BLOOD PRESSURE: 61 MMHG | SYSTOLIC BLOOD PRESSURE: 114 MMHG

## 2019-11-23 VITALS — DIASTOLIC BLOOD PRESSURE: 76 MMHG | SYSTOLIC BLOOD PRESSURE: 138 MMHG

## 2019-11-23 VITALS — DIASTOLIC BLOOD PRESSURE: 64 MMHG | SYSTOLIC BLOOD PRESSURE: 128 MMHG

## 2019-11-23 VITALS — DIASTOLIC BLOOD PRESSURE: 74 MMHG | SYSTOLIC BLOOD PRESSURE: 130 MMHG

## 2019-11-23 VITALS — DIASTOLIC BLOOD PRESSURE: 67 MMHG | SYSTOLIC BLOOD PRESSURE: 130 MMHG

## 2019-11-23 VITALS — SYSTOLIC BLOOD PRESSURE: 130 MMHG | DIASTOLIC BLOOD PRESSURE: 70 MMHG

## 2019-11-23 VITALS — DIASTOLIC BLOOD PRESSURE: 64 MMHG | SYSTOLIC BLOOD PRESSURE: 127 MMHG

## 2019-11-23 VITALS — DIASTOLIC BLOOD PRESSURE: 59 MMHG | SYSTOLIC BLOOD PRESSURE: 129 MMHG

## 2019-11-23 VITALS — SYSTOLIC BLOOD PRESSURE: 100 MMHG | DIASTOLIC BLOOD PRESSURE: 73 MMHG

## 2019-11-23 VITALS — DIASTOLIC BLOOD PRESSURE: 67 MMHG | SYSTOLIC BLOOD PRESSURE: 109 MMHG

## 2019-11-23 VITALS — DIASTOLIC BLOOD PRESSURE: 66 MMHG | SYSTOLIC BLOOD PRESSURE: 123 MMHG

## 2019-11-23 VITALS — SYSTOLIC BLOOD PRESSURE: 124 MMHG | DIASTOLIC BLOOD PRESSURE: 62 MMHG

## 2019-11-23 VITALS — SYSTOLIC BLOOD PRESSURE: 88 MMHG | DIASTOLIC BLOOD PRESSURE: 55 MMHG

## 2019-11-23 VITALS — DIASTOLIC BLOOD PRESSURE: 90 MMHG | SYSTOLIC BLOOD PRESSURE: 112 MMHG

## 2019-11-23 VITALS — SYSTOLIC BLOOD PRESSURE: 104 MMHG | DIASTOLIC BLOOD PRESSURE: 36 MMHG

## 2019-11-23 VITALS — DIASTOLIC BLOOD PRESSURE: 64 MMHG | SYSTOLIC BLOOD PRESSURE: 113 MMHG

## 2019-11-23 LAB
ALBUMIN SERPL-MCNC: 2.9 G/DL (ref 3.4–5)
ALP SERPL-CCNC: 85 U/L (ref 46–116)
ALT SERPL-CCNC: 27 U/L (ref 10–68)
ANION GAP SERPL CALC-SCNC: 11.4 MMOL/L (ref 8–16)
BASOPHILS NFR BLD AUTO: 0 % (ref 0–2)
BILIRUB SERPL-MCNC: 0.73 MG/DL (ref 0.2–1.3)
BUN SERPL-MCNC: 44 MG/DL (ref 7–18)
CALCIUM SERPL-MCNC: 8.5 MG/DL (ref 8.5–10.1)
CHLORIDE SERPL-SCNC: 103 MMOL/L (ref 98–107)
CO2 SERPL-SCNC: 28.5 MMOL/L (ref 21–32)
CREAT SERPL-MCNC: 3 MG/DL (ref 0.6–1.3)
EOSINOPHIL NFR BLD: 0.1 % (ref 0–7)
ERYTHROCYTE [DISTWIDTH] IN BLOOD BY AUTOMATED COUNT: 18.8 % (ref 11.5–14.5)
GLOBULIN SER-MCNC: 4 G/L
GLUCOSE SERPL-MCNC: 135 MG/DL (ref 74–106)
HCT VFR BLD CALC: 30.3 % (ref 42–54)
HGB BLD-MCNC: 9.3 G/DL (ref 13.5–17.5)
IMM GRANULOCYTES NFR BLD: 0.3 % (ref 0–5)
LYMPHOCYTES NFR BLD AUTO: 2.5 % (ref 15–50)
MAGNESIUM SERPL-MCNC: 2.6 MG/DL (ref 1.8–2.4)
MCH RBC QN AUTO: 26.3 PG (ref 26–34)
MCHC RBC AUTO-ENTMCNC: 30.7 G/DL (ref 31–37)
MCV RBC: 85.8 FL (ref 80–100)
MONOCYTES NFR BLD: 5 % (ref 2–11)
NEUTROPHILS NFR BLD AUTO: 92.1 % (ref 40–80)
OSMOLALITY SERPL CALC.SUM OF ELEC: 288 MOSM/KG (ref 275–300)
PHOSPHATE SERPL-MCNC: 4.2 MG/DL (ref 2.5–4.9)
PLATELET # BLD: 622 10X3/UL (ref 130–400)
PMV BLD AUTO: 9.2 FL (ref 7.4–10.4)
POTASSIUM SERPL-SCNC: 4.9 MMOL/L (ref 3.5–5.1)
PROT SERPL-MCNC: 6.9 G/DL (ref 6.4–8.2)
RBC # BLD AUTO: 3.53 10X6/UL (ref 4.2–6.1)
SODIUM SERPL-SCNC: 138 MMOL/L (ref 136–145)
WBC # BLD AUTO: 14.1 10X3/UL (ref 4.8–10.8)

## 2019-11-23 NOTE — NUR
PT'S SPO2 WAS NOTED TO BE DROPING, PT WAS FOUND WITH SHALLOW RESP, PT WAS
PLACED ON BIPAP, SPO2 INCREASED

## 2019-11-23 NOTE — NUR
PT ASSESSMENT COMPLETED AT THIS TIME, NO CHANGES NOTED FROM NURSE REPORT, PT
REMAINS CONFUSED BUT ASKING MORE APPRORIATE QUESTIONS AT THIS TIME, VSS, WILL
CONT TO MONITOR FOR CHANGES

## 2019-11-23 NOTE — NUR
Patient reassessment complete at this time.  Patient more calm and less
restlessness noted.  Vital signs are stable.  No distress noted.  We will
continue to monitor for changes.

## 2019-11-23 NOTE — NUR
PT STILL RESTLESS AT THIS TIME, BUT LESS AGITATED, PT STILL VERY CONFUSED,
RESP STILL 26-28 PER MIN, WILL CONT TO MONITOR

## 2019-11-23 NOTE — NUR
PT GIVEN PM MEDS, PT BECOMING MORE CONFUSED AND RESTLESS, MONITORING FOR PAIN
MEDICATION EFFECTS, VSS, WILL CONT TO MONITOR FOR ADDITIONAL MEDICATION FOR
RESTLESSNESS AND AGITATION

## 2019-11-24 VITALS — SYSTOLIC BLOOD PRESSURE: 123 MMHG | DIASTOLIC BLOOD PRESSURE: 60 MMHG

## 2019-11-24 VITALS — DIASTOLIC BLOOD PRESSURE: 78 MMHG | SYSTOLIC BLOOD PRESSURE: 141 MMHG

## 2019-11-24 VITALS — DIASTOLIC BLOOD PRESSURE: 50 MMHG | SYSTOLIC BLOOD PRESSURE: 112 MMHG

## 2019-11-24 VITALS — SYSTOLIC BLOOD PRESSURE: 117 MMHG | DIASTOLIC BLOOD PRESSURE: 60 MMHG

## 2019-11-24 VITALS — SYSTOLIC BLOOD PRESSURE: 121 MMHG | DIASTOLIC BLOOD PRESSURE: 52 MMHG

## 2019-11-24 VITALS — DIASTOLIC BLOOD PRESSURE: 59 MMHG | SYSTOLIC BLOOD PRESSURE: 119 MMHG

## 2019-11-24 VITALS — DIASTOLIC BLOOD PRESSURE: 60 MMHG | SYSTOLIC BLOOD PRESSURE: 98 MMHG

## 2019-11-24 VITALS — DIASTOLIC BLOOD PRESSURE: 50 MMHG | SYSTOLIC BLOOD PRESSURE: 116 MMHG

## 2019-11-24 VITALS — DIASTOLIC BLOOD PRESSURE: 70 MMHG | SYSTOLIC BLOOD PRESSURE: 107 MMHG

## 2019-11-24 VITALS — SYSTOLIC BLOOD PRESSURE: 112 MMHG | DIASTOLIC BLOOD PRESSURE: 59 MMHG

## 2019-11-24 VITALS — SYSTOLIC BLOOD PRESSURE: 117 MMHG | DIASTOLIC BLOOD PRESSURE: 59 MMHG

## 2019-11-24 VITALS — SYSTOLIC BLOOD PRESSURE: 127 MMHG | DIASTOLIC BLOOD PRESSURE: 61 MMHG

## 2019-11-24 VITALS — DIASTOLIC BLOOD PRESSURE: 49 MMHG | SYSTOLIC BLOOD PRESSURE: 113 MMHG

## 2019-11-24 VITALS — DIASTOLIC BLOOD PRESSURE: 58 MMHG | SYSTOLIC BLOOD PRESSURE: 103 MMHG

## 2019-11-24 VITALS — DIASTOLIC BLOOD PRESSURE: 53 MMHG | SYSTOLIC BLOOD PRESSURE: 129 MMHG

## 2019-11-24 VITALS — SYSTOLIC BLOOD PRESSURE: 87 MMHG | DIASTOLIC BLOOD PRESSURE: 49 MMHG

## 2019-11-24 VITALS — DIASTOLIC BLOOD PRESSURE: 57 MMHG | SYSTOLIC BLOOD PRESSURE: 95 MMHG

## 2019-11-24 VITALS — SYSTOLIC BLOOD PRESSURE: 124 MMHG | DIASTOLIC BLOOD PRESSURE: 56 MMHG

## 2019-11-24 VITALS — DIASTOLIC BLOOD PRESSURE: 66 MMHG | SYSTOLIC BLOOD PRESSURE: 140 MMHG

## 2019-11-24 VITALS — SYSTOLIC BLOOD PRESSURE: 114 MMHG | DIASTOLIC BLOOD PRESSURE: 52 MMHG

## 2019-11-24 VITALS — SYSTOLIC BLOOD PRESSURE: 124 MMHG | DIASTOLIC BLOOD PRESSURE: 53 MMHG

## 2019-11-24 VITALS — SYSTOLIC BLOOD PRESSURE: 133 MMHG | DIASTOLIC BLOOD PRESSURE: 70 MMHG

## 2019-11-24 VITALS — SYSTOLIC BLOOD PRESSURE: 101 MMHG | DIASTOLIC BLOOD PRESSURE: 53 MMHG

## 2019-11-24 LAB
ALBUMIN SERPL-MCNC: 2.8 G/DL (ref 3.4–5)
ALP SERPL-CCNC: 89 U/L (ref 46–116)
ALT SERPL-CCNC: 24 U/L (ref 10–68)
ANION GAP SERPL CALC-SCNC: 9.3 MMOL/L (ref 8–16)
BASOPHILS NFR BLD AUTO: 0.1 % (ref 0–2)
BILIRUB SERPL-MCNC: 0.62 MG/DL (ref 0.2–1.3)
BUN SERPL-MCNC: 53 MG/DL (ref 7–18)
CALCIUM SERPL-MCNC: 8.5 MG/DL (ref 8.5–10.1)
CHLORIDE SERPL-SCNC: 104 MMOL/L (ref 98–107)
CO2 SERPL-SCNC: 29.7 MMOL/L (ref 21–32)
CREAT SERPL-MCNC: 3.3 MG/DL (ref 0.6–1.3)
EOSINOPHIL NFR BLD: 0 % (ref 0–7)
ERYTHROCYTE [DISTWIDTH] IN BLOOD BY AUTOMATED COUNT: 19.1 % (ref 11.5–14.5)
GLOBULIN SER-MCNC: 3.9 G/L
GLUCOSE SERPL-MCNC: 125 MG/DL (ref 74–106)
HCT VFR BLD CALC: 28.5 % (ref 42–54)
HGB BLD-MCNC: 8.8 G/DL (ref 13.5–17.5)
IMM GRANULOCYTES NFR BLD: 0.4 % (ref 0–5)
LYMPHOCYTES NFR BLD AUTO: 2.6 % (ref 15–50)
MCH RBC QN AUTO: 26.9 PG (ref 26–34)
MCHC RBC AUTO-ENTMCNC: 30.9 G/DL (ref 31–37)
MCV RBC: 87.2 FL (ref 80–100)
MONOCYTES NFR BLD: 6 % (ref 2–11)
NEUTROPHILS NFR BLD AUTO: 90.9 % (ref 40–80)
OSMOLALITY SERPL CALC.SUM OF ELEC: 290 MOSM/KG (ref 275–300)
PLATELET # BLD: 635 10X3/UL (ref 130–400)
PMV BLD AUTO: 9.2 FL (ref 7.4–10.4)
POTASSIUM SERPL-SCNC: 5 MMOL/L (ref 3.5–5.1)
PROT SERPL-MCNC: 6.7 G/DL (ref 6.4–8.2)
RBC # BLD AUTO: 3.27 10X6/UL (ref 4.2–6.1)
SODIUM SERPL-SCNC: 138 MMOL/L (ref 136–145)
WBC # BLD AUTO: 15.3 10X3/UL (ref 4.8–10.8)

## 2019-11-24 NOTE — NUR
PT REASSESSMENT COMPLETED AT THIS TIME. NO CHANGES NOTED, PT STILL CONFUSED.
VSS WILL MONITOR FOR CHANGES

## 2019-11-24 NOTE — NUR
Patient assessment completed at this time.  No significant changes noted from
the nurse report.  Patient wakes up periodically answers questions follows
commands.  Patient remains very confused.  Vital signs are stable at this
time.  We will continue to monitor for changes.

## 2019-11-24 NOTE — NUR
Patient was attempted to give p.o. medications at this time.  Patient did have
some difficulty swallowing with meds mixed with applesauce.  Patient not able
to tolerate thin liquids.  Vital signs remained stable at this time.  Will
monitor for changes.

## 2019-11-24 NOTE — NUR
Patient reassessment complete at this time.  No significant changes noted.
Vital signs stable no distress noted.  Will monitor for changes.

## 2019-11-24 NOTE — NUR
Patient resting with eyes closed.  Respirations still tachypneic and patient
remains on BiPAP.  Vital signs are stable at this time.  No distress noted.
We will continue to monitor for changes.

## 2019-11-25 VITALS — SYSTOLIC BLOOD PRESSURE: 107 MMHG | DIASTOLIC BLOOD PRESSURE: 52 MMHG

## 2019-11-25 VITALS — SYSTOLIC BLOOD PRESSURE: 104 MMHG | DIASTOLIC BLOOD PRESSURE: 48 MMHG

## 2019-11-25 VITALS — SYSTOLIC BLOOD PRESSURE: 92 MMHG | DIASTOLIC BLOOD PRESSURE: 49 MMHG

## 2019-11-25 VITALS — SYSTOLIC BLOOD PRESSURE: 55 MMHG | DIASTOLIC BLOOD PRESSURE: 36 MMHG

## 2019-11-25 NOTE — MORECARE
CASE MANAGEMENT DISCHARGE SUMMARY
 
 
PATIENT: BARRY CARR               UNIT: [id number]
ACCOUNT#: [id number]                       ADM DATE: 10/28/19
AGE: 74     : 41  SEX: M            ROOM/BED: D.[facility]    
AUTHOR: FÁTIMA,DOC                             PHYSICIAN:                               
 
REFERRING PHYSICIAN: AMARI DUARTE DO            
DATE OF SERVICE: 19
Discharge Plan
 
 
Patient Name: BARRY CARR
Facility: [facility]:[place]
Encounter #: [id number]
Medical Record #: [id number]
: 1945
Planned Disposition: Home with Home Health
Anticipated Discharge Date: 
 
Discharge Date: 2019
Expected LOS: 
Initial Reviewer: [email or web address]
Initial Review Date: 10/30/2019
Generated: 19   7:56 pm 
DCP- Discharge Planning
 
Updated by [email or web address]: Rozina Rivers on 19   5:08 pm CT
Late Entry  19   
  
CM notified that family has decided to make patient a DNR. CM witness with 
nurse Mary Ann both daughters (Luna & Luna)  agreeing to make patient 
DNR. 
 Daughters have spoken to both Dr. Turcios and Dr. Dey regarding patient 
status.  Family has been updated on patients condition and they are suppose 
to make a decision of LTACH v/s Hospice within the next day or two. CM will 
continue to follow and assist as needed with discharge planning / needs.
DCP- Discharge Planning
 
Updated by [email or web address]: Rozina Rivers on 19   3:31 pm CT
CM spoke with daughter Luna and daughter Luna regarding patient and 
discharge planning.  CM explained that patient at this point will not be able 
to discharge home alone even with Home Health d/t it not being a safe 
discharge.  CM explained that the MD had placed an order for rehab prescreen 
and once patient is stable and if insurance approves inpatient rehab then he 
could possibly go there. Both daughters agree to this plan. CM also explained 
that if insurance doesn't authorize inpatient rehab then then they will most 
likely suggest Skilled Nursing Facility for rehab.  Both daughters state that 
he will never agree to that.  CM received an updated number Luna 
 
[phone] until Wednesday when she can get her phone reactivated. Luna  
[phone].  CM updated daughter' s that patient will most likely transfer 
out to floor once a bed is available. CM will continue to follow and assist 
as needed with discharge planning needs.
DCP- Discharge Planning
 
Updated by [email or web address]: Rozina Rivers on 19  10:37 am CT
CM attempted to contact daughter Luna Carr [phone]. CM received a 
message number is not reachable. CM relayed message to nursing if daughter 
calls ask her to call CM.  CM will continue to follow and assist as needed 
with discharge planning / needs.
DCP- Discharge Planning
 
Updated by [email or web address]: Scott Sun on 10/30/19   4:56 pm CT
Patient Name: BARRY CARR                                     
Admission Status: ER   
Accout number: [id number]                              
Admission Date: 10-   
: 1945                                                        
Admission Diagnosis:   
Attending: AMARI DUARTE                                                
Current LOS:  2   
  
Anticipated DC Date:    
Planned Disposition: Home with Home Health   
Primary Insurance: HUMANA CHOICE O [facility]   
PLANNED EXTERNAL PROVIDER:  [facility]  
  
Discharge Planning Comments:   
CM RECEIVED ORDER TO CONSULT WITH PT REGARDING DISCHARGE NEEDS.  CM MET WITH 
PT IN ROOM TO DISCUSS DISCHARGE PLANNING AND NEEDS. PT REPORTS LIVING AT HOME 
INDEPENDENTLY AND ALONE. PT HAS CANE AND WALKER WITH NO MEDICAL EQUIPMENT  
PROVIDER PREFERENCE.  PT HAD [facility] BUT IT HAD ALREADY STOPPED; 
PT HAS NO OUTSIDE SERVICES ASSISTING IN THE HOME. CM DISCUSSED AVAILABILITY 
OF HOME HEALTH, REHAB SERVICES AND MEDICAL EQUIPMENT. PT DENIES NEED OF 
INPATIENT OR SKILLED REHAB.  PT WANTS HOME HEALTH WITH [facility]; PT STATES HIS 
DAUGHTER WILL BE COOKING AND CLEANING FOR HIM AFTER DISCHARGE, BUT HE WILL 
STILL BE LIVING ALONE.  PT REPORTS HIS DAUGHTER WILL PICK HIM UP FOR 
DISCHARGE HOME. IMPORTANT MESSAGE FROM MEDICARE PROVIDED AND EXPLAINED.  
CHOICE FOR [facility] SIGNED.    
  
CM TO ARRANGE [facility] FOR DISCHARGE HOME WITH PHYSICIAN AGREEMENT 
AND ORDERS.  PCP IS DR. NEGRETE.  CM TO CONTINUE TO FOLLOW AND ASSIST AS NEEDED. 
  
: Scott Sun
 DCPIA - Discharge Planning Initial Assessment
 
Updated by [email or web address]: Scott Sun on 10/30/19   4:52 pm
*  Is the patient Alert and Oriented?
Yes
*  How many steps to enter\exit or inside your home? NONE *  PCP DR. NEGRETE *  Pharmacy
[facility]
 
 
*  Preadmission Environment
Home Alone
*  ADLs
Independent
*  Equipment
Cane
Rolling Walker
*  Other Equipment
NO MEDICAL EQUIPMENT PROVIDER PREFERENCE
*  List name and contact numbers for known caregivers / representatives who 
currently or will assist patient after discharge:
LUNA CARR, DTR, [phone]
*  Verbal permission to speak to the caregivers and representatives has been 
obtained from the patient.
N/A
*  Community resources currently utilized
None
*  Please name any agencies selected above.
NONE
*  Additional services required to return to the preadmission environment?
No
*  Can the patient safely return to the preadmission environment?
Yes
*  Has this patient been hospitalized within the prior 30 days at any 
hospital?
No
 
 
 
 
 
Coverage Notice
 
Reviewer: [email or web address] Sun
 
Notice Issued Date-Time: 10/30/2019  16:40
Notice Type: IM Discharge Notice
 
Notice Delivered To: Patient
Relationship to Patient: 
Representative Name: 
 
Delivery Method: HAND - Hand Delivered
Bell Days:
Prior Verbal Notification: 
 
Recipient Understood Notice: Yes
Recipient Signature: Yes
Med Rec Note Co-signed by Attending:
 
Coverage Notice Comment:  
 
Reviewer: KAY Sun
 
Notice Issued Date-Time: 10/30/2019  16:40
Notice Type: Patient Choice Letter
 
Notice Delivered To: Patient
Relationship to Patient: 
Representative Name: 
 
Delivery Method: HAND - Hand Delivered
Bell Days:
Prior Verbal Notification: 
 
Recipient Understood Notice: Yes
Recipient Signature: Yes
Med Rec Note Co-signed by Attending:
 
Coverage Notice Comment:  [facility]
 
Last DP export: 19   5:16 
Patient Name: BARRY CARR
Encounter #: [id number]
Page [id number]
 
 
 
 
 
Electronically Signed by TANK SHINE on 19 at 1857
 
 
 
 
 
 
**All edits/amendments must be made on the electronic document**
 
DICTATION DATE: 19     : DILMA  19     
RPT#: [id number]                                DC DATE:19
                                               STATUS: DIS IN  
[address] [id number] [place] [address]
***END OF REPORT***

## 2019-11-25 NOTE — NUR
[facility] CALLED AND PATIENT INFORMATION WAS GIVEN TO THEM, COORDINATOR ADVISED
TO CALL HER BACK WHEN THE FAMILY HAD LEFT SO SHE COULD CALL THEM ABOUT
DONATION

## 2019-11-25 NOTE — NUR
0245 CALLED THE NUMBER FOR PATIENT'S DAUGHTER NAE, THERE WAS NO ANSWER.
0247 CALLED PATIENT'S DAUGHTER AGAIN AND THER WAS NO ANSWER.
0250 CALLED PATIENT'S DAUGHTER JUAN AND NOTIFIED OF HER OF PATIENT'S COND.
DECLINING, AND ADVISED THAT THEY SHOULD COME UP TO THE HOSIPITAL

## 2021-05-26 NOTE — NUR
Reassessment completed per flowsheet, no changes noted from previous
assessment. S1/S2 noted NSR on telemetry, rythmic and regular. Breathing is
shallow on 2L via NC with O2 sat 94%, lung sounds clear bilateral upper and
mid with diminished lower. All pulses palpable with cap refill < 3 sec, skin
warm/dry. Denies pain or other needs at this time, see flowsheet for details.
All VSS and will continue to monitor. none

## 2021-10-29 NOTE — NUR
Detail Level: Detailed PT TRANSFERRED TI 2304 VIA BED WITH ACTIVE GI BLEED TO ROOM 2304 HANDED PT OFF
TO ICU RN ROSALIO ATTEMPTED TO CALL DAUGHTER [phone] MULTIPLE TIMES
UNABLE TO REACH THIS IS THE ONLY NUMBER WE HAVE ON THE CHART

## 2022-09-05 NOTE — NUR
BRIE TO LEFT LOWER QUAD PATENT WITH SEROUS SANQUINESS DRAINAGE NOTED. Problem: PAIN - ADULT  Goal: Verbalizes/displays adequate comfort level or baseline comfort level  Description: Interventions:  - Encourage patient to monitor pain and request assistance  - Assess pain using appropriate pain scale  - Administer analgesics based on type and severity of pain and evaluate response  - Implement non-pharmacological measures as appropriate and evaluate response  - Consider cultural and social influences on pain and pain management  - Notify physician/advanced practitioner if interventions unsuccessful or patient reports new pain  Outcome: Progressing     Problem: INFECTION - ADULT  Goal: Absence or prevention of progression during hospitalization  Description: INTERVENTIONS:  - Assess and monitor for signs and symptoms of infection  - Monitor lab/diagnostic results  - Monitor all insertion sites, i e  indwelling lines, tubes, and drains  - Monitor endotracheal if appropriate and nasal secretions for changes in amount and color  - [place] appropriate cooling/warming therapies per order  - Administer medications as ordered  - Instruct and encourage patient and family to use good hand hygiene technique  - Identify and instruct in appropriate isolation precautions for identified infection/condition  Outcome: Progressing  Goal: Absence of fever/infection during neutropenic period  Description: INTERVENTIONS:  - Monitor WBC    Outcome: Progressing     Problem: SAFETY ADULT  Goal: Patient will remain free of falls  Description: INTERVENTIONS:  - Educate patient/family on patient safety including physical limitations  - Instruct patient to call for assistance with activity   - Consult OT/PT to assist with strengthening/mobility   - Keep Call bell within reach  - Keep bed low and locked with side rails adjusted as appropriate  - Keep care items and personal belongings within reach  - Initiate and maintain comfort rounds  - Make Fall Risk Sign visible to staff  - Offer Toileting every  Hours, in advance of need  - Initiate/Maintain alarm  - Obtain necessary fall risk management equipment:   - Apply yellow socks and bracelet for high fall risk patients  - Consider moving patient to room near nurses station  Outcome: Progressing  Goal: Maintain or return to baseline ADL function  Description: INTERVENTIONS:  -  Assess patient's ability to carry out ADLs; assess patient's baseline for ADL function and identify physical deficits which impact ability to perform ADLs (bathing, care of mouth/teeth, toileting, grooming, dressing, etc )  - Assess/evaluate cause of self-care deficits   - Assess range of motion  - Assess patient's mobility; develop plan if impaired  - Assess patient's need for assistive devices and provide as appropriate  - Encourage maximum independence but intervene and supervise when necessary  - Involve family in performance of ADLs  - Assess for home care needs following discharge   - Consider OT consult to assist with ADL evaluation and planning for discharge  - Provide patient education as appropriate  Outcome: Progressing  Goal: Maintains/Returns to pre admission functional level  Description: INTERVENTIONS:  - Perform BMAT or MOVE assessment daily    - Set and communicate daily mobility goal to care team and patient/family/caregiver  - Collaborate with rehabilitation services on mobility goals if consulted  - Perform Range of Motion   times a day  - Reposition patient every   hours    - Dangle patient   times a day  - Stand patient   times a day  - Ambulate patient   times a day  - Out of bed to chair   times a day   - Out of bed for meals      times a day  - Out of bed for toileting  - Record patient progress and toleration of activity level   Outcome: Progressing     Problem: DISCHARGE PLANNING  Goal: Discharge to home or other facility with appropriate resources  Description: INTERVENTIONS:  - Identify barriers to discharge w/patient and caregiver  - Arrange for needed discharge resources and transportation as appropriate  - Identify discharge learning needs (meds, wound care, etc )  - Arrange for interpretive services to assist at discharge as needed  - Refer to Case Management Department for coordinating discharge planning if the patient needs post-hospital services based on physician/advanced practitioner order or complex needs related to functional status, cognitive ability, or social support system  Outcome: Progressing     Problem: Knowledge Deficit  Goal: Patient/family/caregiver demonstrates understanding of disease process, treatment plan, medications, and discharge instructions  Description: Complete learning assessment and assess knowledge base    Interventions:  - Provide teaching at level of understanding  - Provide teaching via preferred learning methods  Outcome: Progressing     Problem: Prexisting or High Potential for Compromised Skin Integrity  Goal: Skin integrity is maintained or improved  Description: INTERVENTIONS:  - Identify patients at risk for skin breakdown  - Assess and monitor skin integrity  - Assess and monitor nutrition and hydration status  - Monitor labs   - Assess for incontinence   - Turn and reposition patient  - Assist with mobility/ambulation  - Relieve pressure over bony prominences  - Avoid friction and shearing  - Provide appropriate hygiene as needed including keeping skin clean and dry  - Evaluate need for skin moisturizer/barrier cream  - Collaborate with interdisciplinary team   - Patient/family teaching  - Consider wound care consult   Outcome: Progressing     Problem: Potential for Falls  Goal: Patient will remain free of falls  Description: INTERVENTIONS:  - Educate patient/family on patient safety including physical limitations  - Instruct patient to call for assistance with activity   - Consult OT/PT to assist with strengthening/mobility   - Keep Call bell within reach  - Keep bed low and locked with side rails adjusted as appropriate  - Keep care items and personal belongings within reach  - Initiate and maintain comfort rounds  - Make Fall Risk Sign visible to staff  - Offer Toileting every  Hours, in advance of need  - Initiate/Maintain alarm  - Obtain necessary fall risk management equipment:   - Apply yellow socks and bracelet for high fall risk patients  - Consider moving patient to room near nurses station  Outcome: Progressing